# Patient Record
Sex: MALE | Race: WHITE | Employment: OTHER | URBAN - METROPOLITAN AREA
[De-identification: names, ages, dates, MRNs, and addresses within clinical notes are randomized per-mention and may not be internally consistent; named-entity substitution may affect disease eponyms.]

---

## 2017-01-26 LAB
ALBUMIN SERPL-MCNC: 4.5 G/DL
ALP BLD-CCNC: 109 U/L
ALT SERPL-CCNC: 16 U/L
ANION GAP SERPL CALCULATED.3IONS-SCNC: NORMAL MMOL/L
AST SERPL-CCNC: 12 U/L
BILIRUB SERPL-MCNC: 0.2 MG/DL (ref 0.1–1.4)
BUN BLDV-MCNC: 10 MG/DL
CALCIUM SERPL-MCNC: 9.3 MG/DL
CHLORIDE BLD-SCNC: 94 MMOL/L
CHOLESTEROL, TOTAL: 155 MG/DL
CHOLESTEROL/HDL RATIO: ABNORMAL
CO2: NORMAL MMOL/L
CREAT SERPL-MCNC: 1 MG/DL
GFR CALCULATED: NORMAL
GLUCOSE BLD-MCNC: 284 MG/DL
HDLC SERPL-MCNC: 92 MG/DL (ref 35–70)
LDL CHOLESTEROL CALCULATED: 38 MG/DL (ref 0–160)
POTASSIUM SERPL-SCNC: 4.8 MMOL/L
SODIUM BLD-SCNC: 136 MMOL/L
TOTAL PROTEIN: 6.3
TRIGL SERPL-MCNC: 124 MG/DL
VLDLC SERPL CALC-MCNC: ABNORMAL MG/DL

## 2017-07-10 ENCOUNTER — OFFICE VISIT (OUTPATIENT)
Dept: CARDIOLOGY CLINIC | Age: 72
End: 2017-07-10

## 2017-07-10 ENCOUNTER — TELEPHONE (OUTPATIENT)
Dept: CARDIOLOGY CLINIC | Age: 72
End: 2017-07-10

## 2017-07-10 VITALS
WEIGHT: 219 LBS | BODY MASS INDEX: 32.44 KG/M2 | SYSTOLIC BLOOD PRESSURE: 138 MMHG | DIASTOLIC BLOOD PRESSURE: 84 MMHG | HEART RATE: 88 BPM | HEIGHT: 69 IN

## 2017-07-10 DIAGNOSIS — E78.2 MIXED HYPERLIPIDEMIA: ICD-10-CM

## 2017-07-10 DIAGNOSIS — E66.09 NON MORBID OBESITY DUE TO EXCESS CALORIES: ICD-10-CM

## 2017-07-10 DIAGNOSIS — I48.92 ATRIAL FLUTTER, UNSPECIFIED TYPE (HCC): Primary | ICD-10-CM

## 2017-07-10 DIAGNOSIS — J44.9 COPD, SEVERE (HCC): ICD-10-CM

## 2017-07-10 DIAGNOSIS — E11.59 TYPE 2 DIABETES MELLITUS WITH OTHER CIRCULATORY COMPLICATION: ICD-10-CM

## 2017-07-10 DIAGNOSIS — I10 ESSENTIAL HYPERTENSION: ICD-10-CM

## 2017-07-10 DIAGNOSIS — I27.81 CHRONIC COR PULMONALE (HCC): ICD-10-CM

## 2017-07-10 PROCEDURE — 99213 OFFICE O/P EST LOW 20 MIN: CPT | Performed by: INTERNAL MEDICINE

## 2017-07-10 PROCEDURE — 93000 ELECTROCARDIOGRAM COMPLETE: CPT | Performed by: INTERNAL MEDICINE

## 2017-07-14 ENCOUNTER — OFFICE VISIT (OUTPATIENT)
Dept: CARDIOLOGY CLINIC | Age: 72
End: 2017-07-14

## 2017-07-14 DIAGNOSIS — I48.92 ATRIAL FLUTTER, UNSPECIFIED TYPE (HCC): Primary | ICD-10-CM

## 2017-07-14 PROCEDURE — 99999 PR OFFICE/OUTPT VISIT,PROCEDURE ONLY: CPT | Performed by: INTERNAL MEDICINE

## 2017-07-17 ENCOUNTER — TELEPHONE (OUTPATIENT)
Dept: CARDIOLOGY CLINIC | Age: 72
End: 2017-07-17

## 2017-07-19 ENCOUNTER — OFFICE VISIT (OUTPATIENT)
Dept: CARDIOLOGY CLINIC | Age: 72
End: 2017-07-19

## 2017-07-19 VITALS
DIASTOLIC BLOOD PRESSURE: 80 MMHG | HEIGHT: 69 IN | WEIGHT: 219.8 LBS | SYSTOLIC BLOOD PRESSURE: 120 MMHG | BODY MASS INDEX: 32.56 KG/M2 | HEART RATE: 88 BPM

## 2017-07-19 DIAGNOSIS — I48.3 TYPICAL ATRIAL FLUTTER (HCC): Primary | ICD-10-CM

## 2017-07-19 PROCEDURE — 99204 OFFICE O/P NEW MOD 45 MIN: CPT | Performed by: INTERNAL MEDICINE

## 2017-07-19 PROCEDURE — 93000 ELECTROCARDIOGRAM COMPLETE: CPT | Performed by: INTERNAL MEDICINE

## 2017-07-20 ENCOUNTER — TELEPHONE (OUTPATIENT)
Dept: CARDIOLOGY CLINIC | Age: 72
End: 2017-07-20

## 2017-07-24 ASSESSMENT — ENCOUNTER SYMPTOMS
CHEST TIGHTNESS: 0
BLOOD IN STOOL: 0
PHOTOPHOBIA: 0
SHORTNESS OF BREATH: 1
NAUSEA: 0
BACK PAIN: 0
COLOR CHANGE: 0
ABDOMINAL PAIN: 0
VOMITING: 0
COUGH: 0
WHEEZING: 0
EYE PAIN: 0

## 2017-07-25 ENCOUNTER — TELEPHONE (OUTPATIENT)
Dept: CARDIOLOGY CLINIC | Age: 72
End: 2017-07-25

## 2017-08-04 ENCOUNTER — HOSPITAL ENCOUNTER (OUTPATIENT)
Dept: GENERAL RADIOLOGY | Age: 72
Discharge: HOME | End: 2017-08-04
Attending: FAMILY MEDICINE | Admitting: FAMILY MEDICINE

## 2017-08-04 ENCOUNTER — HOSPITAL ENCOUNTER (OUTPATIENT)
Dept: GENERAL RADIOLOGY | Age: 72
Discharge: HOME | End: 2017-08-04
Attending: INTERNAL MEDICINE | Admitting: INTERNAL MEDICINE

## 2017-08-04 DIAGNOSIS — I47.1 PAROXYSMAL SUPRAVENTRICULAR TACHYCARDIA (HCC): ICD-10-CM

## 2017-08-04 DIAGNOSIS — J44.1 OBSTRUCTIVE CHRONIC BRONCHITIS WITH EXACERBATION (HCC): ICD-10-CM

## 2017-08-04 DIAGNOSIS — R92.8 ABNORMAL MAMMOGRAM, UNSPECIFIED: ICD-10-CM

## 2017-08-04 DIAGNOSIS — D48.1 NEOPLASM OF UNCERTAIN BEHAVIOR OF CONNECTIVE AND OTHER SOFT TISSUE: ICD-10-CM

## 2017-08-04 DIAGNOSIS — R63.4 LOSS OF WEIGHT: ICD-10-CM

## 2017-08-04 LAB
ALBUMIN SERPL-MCNC: 4.9 GM/DL (ref 3.4–5)
ALP BLD-CCNC: 66 IU/L (ref 40–128)
ALT SERPL-CCNC: 16 U/L (ref 10–40)
ANION GAP SERPL CALCULATED.3IONS-SCNC: 14 MMOL/L (ref 4–16)
APTT: 37.5 SECONDS (ref 21.2–33)
AST SERPL-CCNC: 22 IU/L (ref 15–37)
BASOPHILS ABSOLUTE: 0.1 K/CU MM
BASOPHILS RELATIVE PERCENT: 0.5 % (ref 0–1)
BILIRUB SERPL-MCNC: 0.5 MG/DL (ref 0–1)
BUN BLDV-MCNC: 10 MG/DL (ref 6–23)
CALCIUM SERPL-MCNC: 10 MG/DL (ref 8.3–10.6)
CEA: 3.8 NG/ML
CHLORIDE BLD-SCNC: 92 MMOL/L (ref 99–110)
CO2: 30 MMOL/L (ref 21–32)
CREAT SERPL-MCNC: 0.9 MG/DL (ref 0.9–1.3)
DIFFERENTIAL TYPE: ABNORMAL
EOSINOPHILS ABSOLUTE: 0.2 K/CU MM
EOSINOPHILS RELATIVE PERCENT: 2.1 % (ref 0–3)
GFR AFRICAN AMERICAN: >60 ML/MIN/1.73M2
GFR NON-AFRICAN AMERICAN: >60 ML/MIN/1.73M2
GLUCOSE BLD-MCNC: 87 MG/DL (ref 70–140)
HCT VFR BLD CALC: 49.5 % (ref 42–52)
HEMOGLOBIN: 16.4 GM/DL (ref 13.5–18)
IMMATURE NEUTROPHIL %: 0.4 % (ref 0–0.43)
INR BLD: 0.96 INDEX
LACTATE DEHYDROGENASE: 184 IU/L (ref 120–246)
LYMPHOCYTES ABSOLUTE: 3.8 K/CU MM
LYMPHOCYTES RELATIVE PERCENT: 34.2 % (ref 24–44)
MCH RBC QN AUTO: 32.9 PG (ref 27–31)
MCHC RBC AUTO-ENTMCNC: 33.1 % (ref 32–36)
MCV RBC AUTO: 99.4 FL (ref 78–100)
MONOCYTES ABSOLUTE: 0.9 K/CU MM
MONOCYTES RELATIVE PERCENT: 7.8 % (ref 0–4)
NUCLEATED RBC %: 0 %
PDW BLD-RTO: 12.9 % (ref 11.7–14.9)
PLATELET # BLD: 193 K/CU MM (ref 140–440)
PMV BLD AUTO: 8.8 FL (ref 7.5–11.1)
POTASSIUM SERPL-SCNC: 4.6 MMOL/L (ref 3.5–5.1)
PROLACTIN: 12.2 NG/ML
PROSTATE SPECIFIC ANTIGEN: 0.66 NG/ML (ref 0–4)
PROTHROMBIN TIME: 10.9 SECONDS (ref 9.12–12.5)
RBC # BLD: 4.98 M/CU MM (ref 4.6–6.2)
SEGMENTED NEUTROPHILS ABSOLUTE COUNT: 6.1 K/CU MM
SEGMENTED NEUTROPHILS RELATIVE PERCENT: 55 % (ref 36–66)
SODIUM BLD-SCNC: 136 MMOL/L (ref 135–145)
TOTAL IMMATURE NEUTOROPHIL: 0.04 K/CU MM
TOTAL NUCLEATED RBC: 0 K/CU MM
TOTAL PROTEIN: 7.1 GM/DL (ref 6.4–8.2)
WBC # BLD: 11.1 K/CU MM (ref 4–10.5)

## 2017-08-04 RX ORDER — SODIUM CHLORIDE 0.9 % (FLUSH) 0.9 %
10 SYRINGE (ML) INJECTION ONCE
Status: COMPLETED | OUTPATIENT
Start: 2017-08-04 | End: 2017-08-04

## 2017-08-04 RX ADMIN — Medication 10 ML: at 13:55

## 2017-08-06 LAB — MS ALPHA-FETOPROTEIN: 2

## 2017-08-08 LAB
Lab: NORMAL
TEST NAME: NORMAL

## 2017-08-12 PROBLEM — N63.10 BREAST MASS, RIGHT: Status: ACTIVE | Noted: 2017-08-12

## 2017-08-23 ENCOUNTER — OFFICE VISIT (OUTPATIENT)
Dept: CARDIOLOGY CLINIC | Age: 72
End: 2017-08-23

## 2017-08-23 VITALS
WEIGHT: 214.2 LBS | HEIGHT: 70 IN | DIASTOLIC BLOOD PRESSURE: 92 MMHG | BODY MASS INDEX: 30.67 KG/M2 | SYSTOLIC BLOOD PRESSURE: 162 MMHG | OXYGEN SATURATION: 91 % | HEART RATE: 66 BPM

## 2017-08-23 DIAGNOSIS — I48.3 TYPICAL ATRIAL FLUTTER (HCC): Primary | ICD-10-CM

## 2017-08-23 PROCEDURE — 93000 ELECTROCARDIOGRAM COMPLETE: CPT | Performed by: INTERNAL MEDICINE

## 2017-08-23 PROCEDURE — 99213 OFFICE O/P EST LOW 20 MIN: CPT | Performed by: INTERNAL MEDICINE

## 2017-08-28 ENCOUNTER — TELEPHONE (OUTPATIENT)
Dept: CARDIOLOGY CLINIC | Age: 72
End: 2017-08-28

## 2017-08-28 DIAGNOSIS — Z01.810 PRE-OPERATIVE CARDIOVASCULAR EXAMINATION: Primary | ICD-10-CM

## 2017-08-28 DIAGNOSIS — E78.5 HYPERLIPIDEMIA, UNSPECIFIED HYPERLIPIDEMIA TYPE: ICD-10-CM

## 2017-08-28 DIAGNOSIS — I10 ESSENTIAL HYPERTENSION: ICD-10-CM

## 2017-08-28 DIAGNOSIS — I48.3 TYPICAL ATRIAL FLUTTER (HCC): ICD-10-CM

## 2017-08-28 PROBLEM — R22.2 CHEST MASS: Status: ACTIVE | Noted: 2017-08-28

## 2017-12-27 ENCOUNTER — OFFICE VISIT (OUTPATIENT)
Dept: CARDIOLOGY CLINIC | Age: 72
End: 2017-12-27

## 2017-12-27 VITALS
HEART RATE: 80 BPM | HEIGHT: 70 IN | BODY MASS INDEX: 28.95 KG/M2 | WEIGHT: 202.2 LBS | SYSTOLIC BLOOD PRESSURE: 134 MMHG | DIASTOLIC BLOOD PRESSURE: 80 MMHG

## 2017-12-27 DIAGNOSIS — E11.59 TYPE 2 DIABETES MELLITUS WITH OTHER CIRCULATORY COMPLICATION, WITHOUT LONG-TERM CURRENT USE OF INSULIN (HCC): ICD-10-CM

## 2017-12-27 DIAGNOSIS — I10 ESSENTIAL HYPERTENSION: ICD-10-CM

## 2017-12-27 DIAGNOSIS — I27.81 CHRONIC COR PULMONALE (HCC): ICD-10-CM

## 2017-12-27 DIAGNOSIS — I48.3 TYPICAL ATRIAL FLUTTER (HCC): Primary | ICD-10-CM

## 2017-12-27 DIAGNOSIS — E78.5 HYPERLIPIDEMIA, UNSPECIFIED HYPERLIPIDEMIA TYPE: ICD-10-CM

## 2017-12-27 PROCEDURE — 99213 OFFICE O/P EST LOW 20 MIN: CPT | Performed by: INTERNAL MEDICINE

## 2017-12-27 NOTE — LETTER
Cardiology [address] [place] [id number]  Phone: [phone]  Fax: [phone]    Payton Salinas MD        December 27, 2017     Margot Sosa MD  [address] [place] [id number]    Patient: Sahara Gore  MR Number: [id number]  YOB: 1945  Date of Visit: 12/27/2017    Dear Dr. Margot Sosa:    Thank you for the request for consultation for Jerry Carreon to me for the evaluation of multiple risk factors. Below are the relevant portions of my assessment and plan of care. If you have questions, please do not hesitate to call me. I look forward to following Altagracia Corona along with you.     Sincerely,        Payton Salinas MD

## 2017-12-27 NOTE — PROGRESS NOTES
OFFICE PROGRESS NOTES      Myna Blizzard is a 74 y.o. male who has    CHIEF COMPLAINT AS FOLLOWS:  CHEST PAIN: Patient denies any C/O chest pains at this time. SOB: Has SOB with exertion but no change over previous noted. LEG EDEMA: No leg edema   PALPITATIONS: Denies any C/O Palpitations                              DIZZINESS: No C/O Dizziness   SYNCOPE: None   OTHER:                                     HPI: Patient is here for F/U on his CAD, HTN & Dyslipidemia problems. He does not have any complaints at this time. Mancel Drivers has the following history recorded in care path:  Patient Active Problem List    Diagnosis Date Noted    GI bleed 12/22/2014     Priority: High    Chronic cor pulmonale 11/11/2013     Priority: Medium    Chronic respiratory failure 11/11/2013     Priority: Medium    Hypertension      Priority: Medium    Typical atrial flutter (HCC)      Priority: Medium    Diabetes mellitus      Priority: Medium    Hyperlipidemia      Priority: Medium    COPD (chronic obstructive pulmonary disease)      Priority: Medium    Obesity      Priority: Medium    Chest mass 08/28/2017    Breast mass, right 08/12/2017    COPD, severe 11/16/2015     Current Outpatient Prescriptions   Medication Sig Dispense Refill    carBAMazepine (TEGRETOL) 200 MG tablet Take 200 mg by mouth 3 times daily      busPIRone (BUSPAR) 30 MG tablet Take 30 mg by mouth daily       fluticasone-salmeterol (ADVAIR DISKUS) 500-50 MCG/DOSE diskus inhaler Inhale 1 puff into the lungs every 12 hours.  tadalafil (CIALIS) 5 MG tablet Take 5 mg by mouth as needed for Erectile Dysfunction.  rivaroxaban (XARELTO) 20 MG TABS tablet Take 1 tablet by mouth Daily. **Pt started on 4/15/14 w/samples. ** 90 tablet 3    atorvastatin (LIPITOR) 40 MG tablet Take 40 mg by mouth daily.         diltiazem (CARDIZEM CD) 240 MG ER capsule Take 240 mg by mouth daily.        theophylline CR, 12 hour, (THEODUR) 300 MG CR tablet Take 300 mg by mouth 2 times daily.  escitalopram (LEXAPRO) 20 MG tablet Take 20 mg by mouth daily.  aspirin 81 MG tablet Take 81 mg by mouth daily.  OXYGEN 2.5 L nightly.  tiotropium (SPIRIVA HANDIHALER) 18 MCG inhalation capsule Inhale 18 mcg into the lungs daily. No current facility-administered medications for this visit. Allergies: Review of patient's allergies indicates no known allergies. Past Medical History:   Diagnosis Date    At[facility] flutter ([facility]     10/2006-s/p atrial ablation    COPD (chronic obstructive pulmonary disease) ([facility]     [facility]     H/O 24 hour EKG monitoring 12/06, 11/06 12/06. 24 hr Holter monitor. Normal    H/O cardiovascular stress test 11/10, 12/07    11/10. EF 64%. Normal myocardial study    H/O echocardiogram 11/12/2013, 11/09 11/13 EF55% LA mild dilated, mild MR, mild-mod TR, mild pulm htn 11/09. EF >55%. The left atrium is mildly dilated.  History of cardiac monitoring 5/24/14    14 day Event-normal with no errythmia    History of cardioversion 4/21/2014    Hx of echocardiogram 4/15/2014    sclerotic aortic valve, moderate pulmonary HTN, EF45-50%    Hyperlipidemia     Hypertension     Obesity      Past Surgical History:   Procedure Laterality Date    ATRIAL ABLATION SURGERY  10/06    COLONOSCOPY  3/3/15    severe divertics, multiple polyps, hem    INFECTED SKIN DEBRIDEMENT  3/12    PACEMAKER INSERTION  10/6/06    Bullous emphysema.  No change compared with the previous exam.      As reviewed   Family History   Problem Relation Age of Onset    High Blood Pressure Brother      Social History   Substance Use Topics    Smoking status: Current Every Day Smoker     Packs/day: 0.50     Years: 40.00     Types: Cigarettes     Last attempt to quit: 6/6/2002    Smokeless tobacco: Never Used    Alcohol use 7.2 oz/week     12 Cans of beer per week      Comment: occasionally       Review of Systems:    Constitutional: Negative for diaphoresis and fatigue  Psychological:Negative for anxiety or depression  HENT: Negative for headaches, nasal congestion, sinus pain or vertigo  Eyes: Negative for visual disturbance. Endocrine: Negative for polydipsia/polyuria  Respiratory: Negative for shortness of breath  Cardiovascular: Negative for chest pain, dyspnea on exertion, claudication, edema, irregular heartbeat, murmur, palpitations or shortness of breath  Gastrointestinal: Negative for abdominal pain or heartburn  Genito-Urinary: Negative for urinary frequency/urgency  Musculoskeletal: Negative for muscle pain, muscular weakness, negative for pain in arm and leg or swelling in foot and leg  Neurological: Negative for dizziness, headaches, memory loss, numbness/tingling, visual changes, syncope  Dermatological: Negative for rash    Objective:  /80   Pulse 80   Ht 5' 10\" (1.778 m)   Wt 202 lb 3.2 oz (91.7 kg)   BMI 29.01 kg/m²   Wt Readings from Last 3 Encounters:   12/27/17 202 lb 3.2 oz (91.7 kg)   08/25/17 213 lb 3.2 oz (96.7 kg)   08/23/17 214 lb 3.2 oz (97.2 kg)     Body mass index is 29.01 kg/m². GENERAL - Alert, oriented, pleasant, in no apparent distress. EYES: No jaundice, no conjunctival pallor. SKIN: It is warm & dry. No rashes. No Echhymosis    HEENT  No clinically significant abnormalities seen. Neck - Supple. No jugular venous distention noted. No carotid bruits. Cardiovascular  Normal S1 and S2 without obvious murmur or gallop. Extremities - No cyanosis, clubbing, or significant edema. Pulmonary  No respiratory distress. No wheezes or rales. Abdomen  No masses, tenderness, or organomegaly. Musculoskeletal  No significant edema. No joint deformities. No muscle wasting. Neurologic  Cranial nerves II through XII are grossly intact. There were no gross focal neurologic abnormalities.     Lab Review   No results found mass R22.2       Assessment & Plan:    CAD:None known. HTN:well controlled on current medical regimen, see list above. - changes in  treatment:   no   CARDIOMYOPATHY: None known   CONGESTIVE HEART FAILURE: NO KNOWN HISTORY.     VHD: No significant VHD noted  DYSLIPIDEMIA: Patient's profile is at / near Goal.yes,                                 HDL is high                                Tolerating current medical regimen wellyes,                                                            See most recent Lab values in Labs section above. Diabetes mellitis: .yes,                                      Managed by family MD                                     BS under good control yes,                                      Hgb A1c avilable yes,   OTHER RELEVANT DIAGNOSIS:as noted in patient's active problem list:  TESTS ORDERED:Stress Cardiolite. All previously ordered tests reviewed. ARRHYTHMIAS: Patient has H/O Atrial fibrillation                                He is rate controlled & on anticoagulation. MEDICATIONS: CPM   Office f/u in six months. Primary/secondary prevention is the goal by aggressive risk modification, healthy and therapeutic life style changes for cardiovascular risk reduction. Various goals are discussed and multiple questions answered.

## 2018-01-10 ENCOUNTER — TELEPHONE (OUTPATIENT)
Dept: CARDIOLOGY CLINIC | Age: 73
End: 2018-01-10

## 2018-01-19 LAB
ALBUMIN SERPL-MCNC: 4 G/DL
ALP BLD-CCNC: 46 U/L
ALT SERPL-CCNC: 16 U/L
ANION GAP SERPL CALCULATED.3IONS-SCNC: NORMAL MMOL/L
AST SERPL-CCNC: 15 U/L
AVERAGE GLUCOSE: NORMAL
BASOPHILS ABSOLUTE: 0 /ΜL
BASOPHILS RELATIVE PERCENT: 0.4 %
BILIRUB SERPL-MCNC: 0.4 MG/DL (ref 0.1–1.4)
BUN BLDV-MCNC: 12 MG/DL
CALCIUM SERPL-MCNC: 9.9 MG/DL
CHLORIDE BLD-SCNC: 102 MMOL/L
CHOLESTEROL, TOTAL: 159 MG/DL
CHOLESTEROL/HDL RATIO: ABNORMAL
CO2: 30 MMOL/L
CREAT SERPL-MCNC: 0.8 MG/DL
EOSINOPHILS ABSOLUTE: 0.2 /ΜL
EOSINOPHILS RELATIVE PERCENT: 1.7 %
GFR CALCULATED: NORMAL
GLUCOSE BLD-MCNC: 93 MG/DL
HBA1C MFR BLD: 5.3 %
HCT VFR BLD CALC: 38.7 % (ref 41–53)
HDLC SERPL-MCNC: 128 MG/DL (ref 35–70)
HEMOGLOBIN: 13 G/DL (ref 13.5–17.5)
LDL CHOLESTEROL CALCULATED: 22 MG/DL (ref 0–160)
LYMPHOCYTES ABSOLUTE: 3.9 /ΜL
LYMPHOCYTES RELATIVE PERCENT: 39.8 %
MCH RBC QN AUTO: 33 PG
MCHC RBC AUTO-ENTMCNC: 33.5 G/DL
MCV RBC AUTO: 98.4 FL
MONOCYTES ABSOLUTE: 0.7 /ΜL
MONOCYTES RELATIVE PERCENT: 6.7 %
NEUTROPHILS ABSOLUTE: 5 /ΜL
NEUTROPHILS RELATIVE PERCENT: 51.4 %
PLATELET # BLD: 203 K/ΜL
PMV BLD AUTO: ABNORMAL FL
POTASSIUM SERPL-SCNC: 4.4 MMOL/L
RBC # BLD: 3.93 10^6/ΜL
SODIUM BLD-SCNC: 141 MMOL/L
TOTAL PROTEIN: 5.9
TRIGL SERPL-MCNC: 43 MG/DL
VLDLC SERPL CALC-MCNC: 9 MG/DL
WBC # BLD: 9.8 10^3/ML

## 2018-10-31 ENCOUNTER — HOSPITAL ENCOUNTER (EMERGENCY)
Age: 73
Discharge: HOME | End: 2018-10-31
Payer: MEDICARE

## 2018-10-31 VITALS
WEIGHT: 202 LBS | BODY MASS INDEX: 28.92 KG/M2 | OXYGEN SATURATION: 96 % | DIASTOLIC BLOOD PRESSURE: 85 MMHG | HEIGHT: 70 IN | SYSTOLIC BLOOD PRESSURE: 146 MMHG | TEMPERATURE: 97.8 F | RESPIRATION RATE: 15 BRPM | HEART RATE: 88 BPM

## 2018-10-31 DIAGNOSIS — S61.011A LACERATION OF RIGHT THUMB WITHOUT FOREIGN BODY WITHOUT DAMAGE TO NAIL, INITIAL ENCOUNTER: Primary | ICD-10-CM

## 2018-10-31 PROCEDURE — 6370000000 HC RX 637 (ALT 250 FOR IP): Performed by: PHYSICIAN ASSISTANT

## 2018-10-31 PROCEDURE — 6360000002 HC RX W HCPCS: Performed by: PHYSICIAN ASSISTANT

## 2018-10-31 PROCEDURE — 99283 EMERGENCY DEPT VISIT LOW MDM: CPT

## 2018-10-31 PROCEDURE — 90471 IMMUNIZATION ADMIN: CPT | Performed by: PHYSICIAN ASSISTANT

## 2018-10-31 PROCEDURE — 90715 TDAP VACCINE 7 YRS/> IM: CPT | Performed by: PHYSICIAN ASSISTANT

## 2018-10-31 RX ORDER — ACETAMINOPHEN 500 MG
500 TABLET ORAL ONCE
Status: COMPLETED | OUTPATIENT
Start: 2018-10-31 | End: 2018-10-31

## 2018-10-31 RX ADMIN — ACETAMINOPHEN 500 MG: 500 TABLET, FILM COATED ORAL at 17:51

## 2018-10-31 RX ADMIN — TETANUS TOXOID, REDUCED DIPHTHERIA TOXOID AND ACELLULAR PERTUSSIS VACCINE, ADSORBED 0.5 ML: 5; 2.5; 8; 8; 2.5 SUSPENSION INTRAMUSCULAR at 17:50

## 2018-10-31 RX ADMIN — Medication: at 17:51

## 2018-10-31 ASSESSMENT — PAIN SCALES - GENERAL: PAINLEVEL_OUTOF10: 4

## 2018-10-31 ASSESSMENT — PAIN DESCRIPTION - LOCATION: LOCATION: FINGER (COMMENT WHICH ONE)

## 2018-10-31 ASSESSMENT — PAIN DESCRIPTION - PAIN TYPE: TYPE: ACUTE PAIN

## 2018-10-31 ASSESSMENT — PAIN DESCRIPTION - DESCRIPTORS: DESCRIPTORS: BURNING

## 2018-10-31 ASSESSMENT — PAIN DESCRIPTION - FREQUENCY: FREQUENCY: CONTINUOUS

## 2018-10-31 ASSESSMENT — PAIN DESCRIPTION - ORIENTATION: ORIENTATION: RIGHT

## 2018-10-31 NOTE — ED PROVIDER NOTES
Obesity      Past Surgical History:   Procedure Laterality Date    ATRIAL ABLATION SURGERY  10/06    COLONOSCOPY  3/3/15    severe divertics, multiple polyps, hem    INFECTED SKIN DEBRIDEMENT  3/12    PACEMAKER INSERTION  10/6/06    Bullous emphysema. No change compared with the previous exam.       CURRENT MEDICATIONS    Current Outpatient Rx   Medication Sig Dispense Refill    carBAMazepine (TEGRETOL) 200 MG tablet Take 200 mg by mouth 3 times daily      busPIRone (BUSPAR) 30 MG tablet Take 30 mg by mouth daily       fluticasone-salmeterol (ADVAIR DISKUS) 500-50 MCG/DOSE diskus inhaler Inhale 1 puff into the lungs every 12 hours.  tadalafil (CIALIS) 5 MG tablet Take 5 mg by mouth as needed for Erectile Dysfunction.  rivaroxaban (XARELTO) 20 MG TABS tablet Take 1 tablet by mouth Daily. **Pt started on 4/15/14 w/samples. ** 90 tablet 3    atorvastatin (LIPITOR) 40 MG tablet Take 40 mg by mouth daily.  diltiazem (CARDIZEM CD) 240 MG ER capsule Take 240 mg by mouth daily.  theophylline CR, 12 hour, (THEODUR) 300 MG CR tablet Take 300 mg by mouth 2 times daily.  escitalopram (LEXAPRO) 20 MG tablet Take 20 mg by mouth daily.  aspirin 81 MG tablet Take 81 mg by mouth daily.  OXYGEN 2.5 L nightly.  tiotropium (SPIRIVA HANDIHALER) 18 MCG inhalation capsule Inhale 18 mcg into the lungs daily.            ALLERGIES    No Known Allergies    SOCIAL & FAMILY HISTORY    Social History     Social History    Marital status:      Spouse name: N/A    Number of children: N/A    Years of education: N/A     Social History Main Topics    Smoking status: Current Every Day Smoker     Packs/day: 0.50     Years: 40.00     Types: Cigarettes     Last attempt to quit: 6/6/2002    Smokeless tobacco: Never Used    Alcohol use 7.2 oz/week     12 Cans of beer per week      Comment: occasionally     Drug use: No    Sexual activity: Yes     Partners: Female      Comment:

## 2020-11-03 PROBLEM — J44.9 COPD (CHRONIC OBSTRUCTIVE PULMONARY DISEASE) (HCC): Status: RESOLVED | Noted: 2020-11-03 | Resolved: 2020-11-03

## 2021-02-09 PROBLEM — K43.9 SPIGELIAN HERNIA: Status: ACTIVE | Noted: 2021-02-09

## 2021-02-14 ENCOUNTER — APPOINTMENT (OUTPATIENT)
Dept: CT IMAGING | Age: 76
DRG: 394 | End: 2021-02-14
Payer: OTHER GOVERNMENT

## 2021-02-14 ENCOUNTER — HOSPITAL ENCOUNTER (INPATIENT)
Age: 76
LOS: 2 days | Discharge: HOME | DRG: 394 | End: 2021-02-16
Attending: INTERNAL MEDICINE | Admitting: INTERNAL MEDICINE
Payer: OTHER GOVERNMENT

## 2021-02-14 DIAGNOSIS — K43.9 ABDOMINAL WALL HERNIA: Primary | ICD-10-CM

## 2021-02-14 DIAGNOSIS — R10.32 ABDOMINAL PAIN, LEFT LOWER QUADRANT: ICD-10-CM

## 2021-02-14 PROBLEM — K46.9 ABDOMINAL HERNIA: Status: ACTIVE | Noted: 2021-02-14

## 2021-02-14 LAB
ALBUMIN SERPL-MCNC: 4.2 GM/DL (ref 3.4–5)
ALP BLD-CCNC: 62 IU/L (ref 40–128)
ALT SERPL-CCNC: 14 U/L (ref 10–40)
AMORPHOUS: ABNORMAL /HPF
ANION GAP SERPL CALCULATED.3IONS-SCNC: 11 MMOL/L (ref 4–16)
AST SERPL-CCNC: 23 IU/L (ref 15–37)
BACTERIA: NEGATIVE /HPF
BASOPHILS ABSOLUTE: 0.1 K/CU MM
BASOPHILS RELATIVE PERCENT: 0.4 % (ref 0–1)
BILIRUB SERPL-MCNC: 0.3 MG/DL (ref 0–1)
BILIRUBIN URINE: NEGATIVE MG/DL
BLOOD, URINE: NEGATIVE
BUN BLDV-MCNC: 8 MG/DL (ref 6–23)
CALCIUM SERPL-MCNC: 9 MG/DL (ref 8.3–10.6)
CHLORIDE BLD-SCNC: 98 MMOL/L (ref 99–110)
CLARITY: CLEAR
CO2: 26 MMOL/L (ref 21–32)
COLOR: ABNORMAL
CREAT SERPL-MCNC: 1 MG/DL (ref 0.9–1.3)
DIFFERENTIAL TYPE: ABNORMAL
EOSINOPHILS ABSOLUTE: 0.1 K/CU MM
EOSINOPHILS RELATIVE PERCENT: 1 % (ref 0–3)
GFR AFRICAN AMERICAN: >60 ML/MIN/1.73M2
GFR NON-AFRICAN AMERICAN: >60 ML/MIN/1.73M2
GLUCOSE BLD-MCNC: 130 MG/DL (ref 70–99)
GLUCOSE BLD-MCNC: 74 MG/DL (ref 70–99)
GLUCOSE BLD-MCNC: 89 MG/DL (ref 70–99)
GLUCOSE, URINE: NEGATIVE MG/DL
HCT VFR BLD CALC: 42.9 % (ref 42–52)
HEMOGLOBIN: 13.5 GM/DL (ref 13.5–18)
IMMATURE NEUTROPHIL %: 0.3 % (ref 0–0.43)
INR BLD: 1.36 INDEX
KETONES, URINE: NEGATIVE MG/DL
LACTATE: 0.8 MMOL/L (ref 0.4–2)
LACTATE: 2 MMOL/L (ref 0.4–2)
LEUKOCYTE ESTERASE, URINE: NEGATIVE
LIPASE: 30 IU/L (ref 13–60)
LYMPHOCYTES ABSOLUTE: 5.5 K/CU MM
LYMPHOCYTES RELATIVE PERCENT: 44 % (ref 24–44)
MAGNESIUM: 1.7 MG/DL (ref 1.8–2.4)
MCH RBC QN AUTO: 32 PG (ref 27–31)
MCHC RBC AUTO-ENTMCNC: 31.5 % (ref 32–36)
MCV RBC AUTO: 101.7 FL (ref 78–100)
MONOCYTES ABSOLUTE: 1 K/CU MM
MONOCYTES RELATIVE PERCENT: 8 % (ref 0–4)
MUCUS: ABNORMAL HPF
NITRITE URINE, QUANTITATIVE: NEGATIVE
NUCLEATED RBC %: 0 %
PDW BLD-RTO: 13.6 % (ref 11.7–14.9)
PH, URINE: 7 (ref 5–8)
PLATELET # BLD: 173 K/CU MM (ref 140–440)
PMV BLD AUTO: 8.8 FL (ref 7.5–11.1)
POTASSIUM SERPL-SCNC: 4.1 MMOL/L (ref 3.5–5.1)
PROTEIN UA: 30 MG/DL
PROTHROMBIN TIME: 16.5 SECONDS (ref 11.7–14.5)
RBC # BLD: 4.22 M/CU MM (ref 4.6–6.2)
RBC URINE: <1 /HPF (ref 0–3)
SARS-COV-2, NAAT: NOT DETECTED
SEGMENTED NEUTROPHILS ABSOLUTE COUNT: 5.8 K/CU MM
SEGMENTED NEUTROPHILS RELATIVE PERCENT: 46.3 % (ref 36–66)
SODIUM BLD-SCNC: 135 MMOL/L (ref 135–145)
SOURCE: NORMAL
SPECIFIC GRAVITY UA: 1 (ref 1–1.03)
TOTAL IMMATURE NEUTOROPHIL: 0.04 K/CU MM
TOTAL NUCLEATED RBC: 0 K/CU MM
TOTAL PROTEIN: 6.6 GM/DL (ref 6.4–8.2)
TRICHOMONAS: ABNORMAL /HPF
UROBILINOGEN, URINE: NEGATIVE MG/DL (ref 0.2–1)
WBC # BLD: 12.5 K/CU MM (ref 4–10.5)
WBC UA: <1 /HPF (ref 0–2)

## 2021-02-14 PROCEDURE — 2700000000 HC OXYGEN THERAPY PER DAY

## 2021-02-14 PROCEDURE — 96374 THER/PROPH/DIAG INJ IV PUSH: CPT

## 2021-02-14 PROCEDURE — 83735 ASSAY OF MAGNESIUM: CPT

## 2021-02-14 PROCEDURE — 74177 CT ABD & PELVIS W/CONTRAST: CPT

## 2021-02-14 PROCEDURE — 96361 HYDRATE IV INFUSION ADD-ON: CPT

## 2021-02-14 PROCEDURE — 6360000002 HC RX W HCPCS: Performed by: NURSE PRACTITIONER

## 2021-02-14 PROCEDURE — 6360000004 HC RX CONTRAST MEDICATION: Performed by: PHYSICIAN ASSISTANT

## 2021-02-14 PROCEDURE — 94761 N-INVAS EAR/PLS OXIMETRY MLT: CPT

## 2021-02-14 PROCEDURE — 2580000003 HC RX 258: Performed by: PHYSICIAN ASSISTANT

## 2021-02-14 PROCEDURE — 6370000000 HC RX 637 (ALT 250 FOR IP): Performed by: NURSE PRACTITIONER

## 2021-02-14 PROCEDURE — 6360000002 HC RX W HCPCS: Performed by: PHYSICIAN ASSISTANT

## 2021-02-14 PROCEDURE — 36415 COLL VENOUS BLD VENIPUNCTURE: CPT

## 2021-02-14 PROCEDURE — 82962 GLUCOSE BLOOD TEST: CPT

## 2021-02-14 PROCEDURE — 85025 COMPLETE CBC W/AUTO DIFF WBC: CPT

## 2021-02-14 PROCEDURE — 80053 COMPREHEN METABOLIC PANEL: CPT

## 2021-02-14 PROCEDURE — 1200000000 HC SEMI PRIVATE

## 2021-02-14 PROCEDURE — 83690 ASSAY OF LIPASE: CPT

## 2021-02-14 PROCEDURE — 99285 EMERGENCY DEPT VISIT HI MDM: CPT

## 2021-02-14 PROCEDURE — 85610 PROTHROMBIN TIME: CPT

## 2021-02-14 PROCEDURE — 2580000003 HC RX 258: Performed by: NURSE PRACTITIONER

## 2021-02-14 PROCEDURE — 96375 TX/PRO/DX INJ NEW DRUG ADDON: CPT

## 2021-02-14 PROCEDURE — 81001 URINALYSIS AUTO W/SCOPE: CPT

## 2021-02-14 PROCEDURE — U0002 COVID-19 LAB TEST NON-CDC: HCPCS

## 2021-02-14 PROCEDURE — 83605 ASSAY OF LACTIC ACID: CPT

## 2021-02-14 PROCEDURE — 6370000000 HC RX 637 (ALT 250 FOR IP): Performed by: PHYSICIAN ASSISTANT

## 2021-02-14 RX ORDER — ONDANSETRON 2 MG/ML
4 INJECTION INTRAMUSCULAR; INTRAVENOUS EVERY 6 HOURS PRN
Status: DISCONTINUED | OUTPATIENT
Start: 2021-02-14 | End: 2021-02-16 | Stop reason: HOSPADM

## 2021-02-14 RX ORDER — DILTIAZEM HYDROCHLORIDE 240 MG/1
240 CAPSULE, COATED, EXTENDED RELEASE ORAL DAILY
Status: CANCELLED | OUTPATIENT
Start: 2021-02-14

## 2021-02-14 RX ORDER — DILTIAZEM HYDROCHLORIDE 240 MG/1
240 CAPSULE, COATED, EXTENDED RELEASE ORAL NIGHTLY
Status: DISCONTINUED | OUTPATIENT
Start: 2021-02-15 | End: 2021-02-16 | Stop reason: HOSPADM

## 2021-02-14 RX ORDER — 0.9 % SODIUM CHLORIDE 0.9 %
1000 INTRAVENOUS SOLUTION INTRAVENOUS ONCE
Status: COMPLETED | OUTPATIENT
Start: 2021-02-14 | End: 2021-02-14

## 2021-02-14 RX ORDER — MAGNESIUM SULFATE 1 G/100ML
1000 INJECTION INTRAVENOUS PRN
Status: DISCONTINUED | OUTPATIENT
Start: 2021-02-14 | End: 2021-02-16 | Stop reason: HOSPADM

## 2021-02-14 RX ORDER — FLUTICASONE FUROATE, UMECLIDINIUM BROMIDE AND VILANTEROL TRIFENATATE 100; 62.5; 25 UG/1; UG/1; UG/1
1 POWDER RESPIRATORY (INHALATION) DAILY
COMMUNITY

## 2021-02-14 RX ORDER — MORPHINE SULFATE 4 MG/ML
4 INJECTION, SOLUTION INTRAMUSCULAR; INTRAVENOUS EVERY 4 HOURS PRN
Status: DISCONTINUED | OUTPATIENT
Start: 2021-02-14 | End: 2021-02-16 | Stop reason: HOSPADM

## 2021-02-14 RX ORDER — NICOTINE POLACRILEX 4 MG
15 LOZENGE BUCCAL PRN
Status: DISCONTINUED | OUTPATIENT
Start: 2021-02-14 | End: 2021-02-16 | Stop reason: HOSPADM

## 2021-02-14 RX ORDER — ONDANSETRON 2 MG/ML
4 INJECTION INTRAMUSCULAR; INTRAVENOUS EVERY 30 MIN PRN
Status: DISCONTINUED | OUTPATIENT
Start: 2021-02-14 | End: 2021-02-16 | Stop reason: HOSPADM

## 2021-02-14 RX ORDER — PROMETHAZINE HYDROCHLORIDE 25 MG/1
12.5 TABLET ORAL EVERY 6 HOURS PRN
Status: DISCONTINUED | OUTPATIENT
Start: 2021-02-14 | End: 2021-02-16 | Stop reason: HOSPADM

## 2021-02-14 RX ORDER — ATORVASTATIN CALCIUM 40 MG/1
40 TABLET, FILM COATED ORAL NIGHTLY
Status: DISCONTINUED | OUTPATIENT
Start: 2021-02-15 | End: 2021-02-16 | Stop reason: HOSPADM

## 2021-02-14 RX ORDER — ESCITALOPRAM OXALATE 10 MG/1
20 TABLET ORAL DAILY
Status: DISCONTINUED | OUTPATIENT
Start: 2021-02-15 | End: 2021-02-16 | Stop reason: HOSPADM

## 2021-02-14 RX ORDER — ACETAMINOPHEN 650 MG/1
650 SUPPOSITORY RECTAL EVERY 6 HOURS PRN
Status: DISCONTINUED | OUTPATIENT
Start: 2021-02-14 | End: 2021-02-16 | Stop reason: HOSPADM

## 2021-02-14 RX ORDER — DILTIAZEM HYDROCHLORIDE 240 MG/1
240 CAPSULE, COATED, EXTENDED RELEASE ORAL ONCE
Status: COMPLETED | OUTPATIENT
Start: 2021-02-14 | End: 2021-02-14

## 2021-02-14 RX ORDER — POLYETHYLENE GLYCOL 3350 17 G/17G
17 POWDER, FOR SOLUTION ORAL DAILY PRN
Status: DISCONTINUED | OUTPATIENT
Start: 2021-02-14 | End: 2021-02-16 | Stop reason: HOSPADM

## 2021-02-14 RX ORDER — SODIUM CHLORIDE 0.9 % (FLUSH) 0.9 %
10 SYRINGE (ML) INJECTION PRN
Status: DISCONTINUED | OUTPATIENT
Start: 2021-02-14 | End: 2021-02-16 | Stop reason: HOSPADM

## 2021-02-14 RX ORDER — SODIUM CHLORIDE 0.9 % (FLUSH) 0.9 %
10 SYRINGE (ML) INJECTION EVERY 12 HOURS SCHEDULED
Status: DISCONTINUED | OUTPATIENT
Start: 2021-02-14 | End: 2021-02-16 | Stop reason: HOSPADM

## 2021-02-14 RX ORDER — BUSPIRONE HYDROCHLORIDE 15 MG/1
30 TABLET ORAL DAILY
Status: DISCONTINUED | OUTPATIENT
Start: 2021-02-14 | End: 2021-02-16 | Stop reason: HOSPADM

## 2021-02-14 RX ORDER — MORPHINE SULFATE 4 MG/ML
4 INJECTION, SOLUTION INTRAMUSCULAR; INTRAVENOUS EVERY 30 MIN PRN
Status: DISCONTINUED | OUTPATIENT
Start: 2021-02-14 | End: 2021-02-16 | Stop reason: HOSPADM

## 2021-02-14 RX ORDER — BUDESONIDE AND FORMOTEROL FUMARATE DIHYDRATE 160; 4.5 UG/1; UG/1
2 AEROSOL RESPIRATORY (INHALATION) 2 TIMES DAILY
Status: DISCONTINUED | OUTPATIENT
Start: 2021-02-14 | End: 2021-02-16 | Stop reason: HOSPADM

## 2021-02-14 RX ORDER — DEXTROSE MONOHYDRATE 25 G/50ML
12.5 INJECTION, SOLUTION INTRAVENOUS PRN
Status: DISCONTINUED | OUTPATIENT
Start: 2021-02-14 | End: 2021-02-16 | Stop reason: HOSPADM

## 2021-02-14 RX ORDER — HYDROCODONE BITARTRATE AND ACETAMINOPHEN 5; 325 MG/1; MG/1
1 TABLET ORAL EVERY 6 HOURS PRN
Status: DISCONTINUED | OUTPATIENT
Start: 2021-02-14 | End: 2021-02-16 | Stop reason: HOSPADM

## 2021-02-14 RX ORDER — MORPHINE SULFATE 2 MG/ML
2 INJECTION, SOLUTION INTRAMUSCULAR; INTRAVENOUS EVERY 4 HOURS PRN
Status: DISCONTINUED | OUTPATIENT
Start: 2021-02-14 | End: 2021-02-16 | Stop reason: HOSPADM

## 2021-02-14 RX ORDER — DEXTROSE MONOHYDRATE 50 MG/ML
100 INJECTION, SOLUTION INTRAVENOUS PRN
Status: DISCONTINUED | OUTPATIENT
Start: 2021-02-14 | End: 2021-02-16 | Stop reason: HOSPADM

## 2021-02-14 RX ORDER — SODIUM CHLORIDE 9 MG/ML
INJECTION, SOLUTION INTRAVENOUS CONTINUOUS
Status: ACTIVE | OUTPATIENT
Start: 2021-02-15 | End: 2021-02-15

## 2021-02-14 RX ORDER — ACETAMINOPHEN 325 MG/1
650 TABLET ORAL EVERY 6 HOURS PRN
Status: DISCONTINUED | OUTPATIENT
Start: 2021-02-14 | End: 2021-02-16 | Stop reason: HOSPADM

## 2021-02-14 RX ADMIN — SODIUM CHLORIDE, PRESERVATIVE FREE 10 ML: 5 INJECTION INTRAVENOUS at 21:41

## 2021-02-14 RX ADMIN — MAGNESIUM SULFATE IN DEXTROSE 1000 MG: 10 INJECTION, SOLUTION INTRAVENOUS at 23:24

## 2021-02-14 RX ADMIN — BUSPIRONE HYDROCHLORIDE 30 MG: 15 TABLET ORAL at 20:01

## 2021-02-14 RX ADMIN — SODIUM CHLORIDE: 9 INJECTION, SOLUTION INTRAVENOUS at 23:24

## 2021-02-14 RX ADMIN — IOPAMIDOL 75 ML: 755 INJECTION, SOLUTION INTRAVENOUS at 14:05

## 2021-02-14 RX ADMIN — MORPHINE SULFATE 2 MG: 2 INJECTION, SOLUTION INTRAMUSCULAR; INTRAVENOUS at 20:02

## 2021-02-14 RX ADMIN — THEOPHYLLINE ANHYDROUS 300 MG: 300 CAPSULE, EXTENDED RELEASE ORAL at 20:01

## 2021-02-14 RX ADMIN — HYDROCODONE BITARTRATE AND ACETAMINOPHEN 1 TABLET: 5; 325 TABLET ORAL at 21:32

## 2021-02-14 RX ADMIN — MORPHINE SULFATE 4 MG: 4 INJECTION, SOLUTION INTRAMUSCULAR; INTRAVENOUS at 13:36

## 2021-02-14 RX ADMIN — DILTIAZEM HYDROCHLORIDE 240 MG: 240 CAPSULE, EXTENDED RELEASE ORAL at 17:10

## 2021-02-14 RX ADMIN — SODIUM CHLORIDE 1000 ML: 9 INJECTION, SOLUTION INTRAVENOUS at 13:33

## 2021-02-14 RX ADMIN — ONDANSETRON 4 MG: 2 INJECTION INTRAMUSCULAR; INTRAVENOUS at 13:34

## 2021-02-14 ASSESSMENT — PAIN SCALES - GENERAL
PAINLEVEL_OUTOF10: 8
PAINLEVEL_OUTOF10: 0

## 2021-02-14 ASSESSMENT — PAIN DESCRIPTION - LOCATION
LOCATION: ABDOMEN

## 2021-02-14 ASSESSMENT — PAIN DESCRIPTION - ORIENTATION
ORIENTATION: LEFT

## 2021-02-14 NOTE — H&P
History and Physical      Name:  Sherry Barrios /Age/Sex:   (66 y.o. male)   MRN & CSN:  [id number] & [id number] Admission Date/Time: 2021 12:45 PM   Location:  ED20/ED-20 PCP: Emily Loco MD       Discussed patient with Dr. Crow Way and Plan:     Sherry Barrios is a 66 y.o.  male  who presents with abdominal pain    - LLQ abdominal pain  CT: Left (and Right) anterior hernia without evidence of obstruction  Denies n/v/d; had regular bowel fx this morning  Admission as pt still with mild but improved pain; ? Surgery tomorrow, per ED  Gen Surg consulted by ED, pt has already seen Dr Roxanne Roque for same issue  Lactic 2, repeat  NPO at MN; Gentle IVF at MN; pain control; holding abx- monitor WBC  Serial abdominal exams  Check INR, covid screen (? Surgery)    - Alcohol use  Reports 24 oz of beer per day; last yesterday  Denies hx of ETOH withdrawal  CIWA 0 in ED  monitor    - ?  Liver lesion  1 cm focus of increased echogenicity in R lobe of liver  Follow-up as outpatient; radiology recommends dedicated CT or MRI    Chronic  - COPD- Uses 2 L NC nocturnal only; no in exacerbation; cont home meds  - HLD- Cont Statin  - DM- Start SSI; hold home meds  - PAF- holding xarelto, cont dilt  - Depression with anxiety- cont lexapro and buspar    Discussed patient with ER physician     Diet NPO    DVT Prophylaxis [] Lovenox, []  Heparin, [x] SCDs, [] Ambulation    GI Prophylaxis [] PPI,  [] H2 Blocker,  [] Carafate,  [] Diet/Tube Feeds   Code Status Full   Disposition Patient requires continued admission due to    MDM [] Low, [x] Moderate,[]  High  Patient's risk as above due to      [] One or more chronic illnesses with exacerbation progression      [x] Two or more stable chronic illnesses      [x] Undiagnosed new problem with uncertain prognosis      [] Elective major surgery      []Prescription drug management       History of Present Illness:     Chief Complaint: Abdominal pain    Heavenly Cabezas Eddie Cruz is a 66 y.o.  male  who presents with the above complaints, onset today. Context is, patient has known hernia, was seen by Dr. Heavenly Simms, general surgeon, last fall. States he has had intermittent episodes of pain in the left lower abdomen that have resolved spontaneously since the hernia was diagnosed. He denies nausea vomiting diarrhea. Reports normal bowel function this morning. States his pain is much improved at time of exam.  Per ED staff, patient was going to be discharged home but due to still having some residual pain, patient and general surgery decided for admission. Questionable surgical repair in the morning, per ED staff patient denies fever, chills, chest pain/pressure, sob. He reports history of COPD, uses 2.5 L of oxygen nocturnally only. Denies increase in cough recently. Confirms full CODE STATUS. Reports 1-2 beers consumed daily. Denies history of alcohol withdrawal or seizures    Ten point ROS reviewed negative, unless as noted above    Objective:     No intake or output data in the 24 hours ending 02/14/21 1525     Vitals:   Vitals:    02/14/21 1501   BP: (!) 145/86   Pulse: 76   Resp: 25   Temp: 98.0   SpO2: 97%       Physical Exam:     GEN Awake male, sitting upright in bed in no apparent distress. Appears given age. EYES Pupils are equally round. No scleral erythema, discharge, or conjunctivitis. HENT Mucous membranes are moist. Oral pharynx without exudates, no evidence of thrush. NECK Supple, no apparent thyromegaly or masses. RESP Clear to auscultation, no wheezes, rales or rhonchi. Symmetric chest movement while on room air. CARDIO/VASC S1/S2 auscultated. Regular rate without appreciable murmurs, rubs, or gallops. No JVD or carotid bruits. Peripheral pulses equal bilaterally and palpable. No peripheral edema. GI Abdomen is soft without significant tenderness, masses, or guarding. Bowel sounds are normoactive. Rectal exam deferred.     No costovertebral angle tenderness. Díaz catheter is not present. HEME/LYMPH No palpable cervical lymphadenopathy and no hepatosplenomegaly. No petechiae or ecchymoses. MSK No gross joint deformities. Strength equal in BLE and BUE  SKIN Normal coloration, warm, dry. NEURO Cranial nerves appear grossly intact, normal speech, no lateralizing weakness. PSYCH Awake, alert, oriented x 4. Affect appropriate. Past Medical History:        Past Medical History:   Diagnosis Date    Alcohol abuse     Atrial flutter ([facility]     10/2006-s/p atrial ablation    [facility]     Diabetes mellitus [facility]     H/O 24 hour EKG monitoring 12/06, 11/06 12/06. 24 hr Holter monitor. Normal    H/O cardiovascular stress test 11/10, 12/07    11/10. EF 64%. Normal myocardial study    H/O echocardiogram 11/12/2013, 11/09 11/13 EF55% LA mild dilated, mild MR, mild-mod TR, mild pulm htn 11/09. EF >55%. The left atrium is mildly dilated.  History of cardiac monitoring 5/24/14    14 day Event-normal with no errythmia    History of cardioversion 4/21/2014    Hx of echocardiogram 4/15/2014    sclerotic aortic valve, moderate pulmonary HTN, EF45-50%    Hyperlipidemia     Hypertension     Obesity        PSHX:  has a past surgical history that includes Atrial ablation surgery (10/06); Pacemaker insertion (10/6/06); Infected skin debridement (3/12); and Colonoscopy (3/3/15). Allergies: No Known Allergies    FAM HX: family history includes High Blood Pressure in his brother.     Soc HX:   Social History     Socioeconomic History    Marital status:      Spouse name: None    Number of children: None    Years of education: None    Highest education level: None   Occupational History    None   Social Needs    Financial resource strain: None    Food insecurity     Worry: None     Inability: None    Transportation needs     Medical: None     Non-medical: None   Tobacco Use    Smoking status: Current Every Day Smoker     Packs/day: 0.50     Years: 40.00     Pack years: 20.00     Types: Cigarettes     Last attempt to quit: 2002     Years since quittin.7    Smokeless tobacco: Never Used   Substance and Sexual Activity    Alcohol use: Yes     Alcohol/week: 12.0 standard drinks     Types: 12 Cans of beer per week     Comment: occasionally     Drug use: No    Sexual activity: Yes     Partners: Female     Comment:    Lifestyle    Physical activity     Days per week: None     Minutes per session: None    Stress: None   Relationships    Social connections     Talks on phone: None     Gets together: None     Attends Congregation service: None     Active member of club or organization: None     Attends meetings of clubs or organizations: None     Relationship status: None    Intimate partner violence     Fear of current or ex partner: None     Emotionally abused: None     Physically abused: None     Forced sexual activity: None   Other Topics Concern    None   Social History Narrative    None       Medications:     Medications:    fluticasone-salmeterol (ADVAIR DISKUS) 500-50 MCG/DOSE diskus inhaler Inhale 1 puff into the lungs every 12 hours. Connie Edwards MD Needs Review   rivaroxaban (XARELTO) 20 MG TABS tablet Take 1 tablet by mouth Daily. **Pt started on 4/15/14 w/samples. ** Cesar Ramírez MD Needs Review   atorvastatin (LIPITOR) 40 MG tablet Take 40 mg by mouth daily.   Connie Edwards MD Needs Review   diltiazem (CARDIZEM CD) 240 MG ER capsule Take 240 mg by mouth daily.   Connie Edwards MD Needs Review   theophylline CR, 12 hour, (THEODUR) 300 MG CR tablet Take 300 mg by mouth 2 times daily.   Connie Edwards MD Needs Review   escitalopram (LEXAPRO) 20 MG tablet Take 20 mg by mouth daily.   Connie Edwards MD Needs Review   tiotropium (SPIRIVA HANDIHALER) 18 MCG inhalation capsule Inhale 18 mcg into the lungs daily.   Connie Edwards MD Needs Review metFORMIN (GLUCOPHAGE) 1000 MG tablet Take 1,000 mg by mouth 2 times daily (with meals) Historical Provider, MD Needs Review   carBAMazepine (TEGRETOL) 200 MG tablet Take 200 mg by mouth 3 times daily Historical MD Jerry Needs Review   busPIRone (BUSPAR) 30 MG tablet Take 30 mg by mouth daily  Historical MD Jerry Needs Review   tadalafil (CIALIS) 5 MG tablet Take 5 mg by mouth as needed for Erectile Dysfunction. Conine Edwards MD Needs Review   aspirin 81 MG tablet Take 81 mg by mouth daily.   Connie Edwards MD Needs Review   OXYGEN 2.5 L nightly.   Connie Edwards MD Needs Review       Data:     CT ABDOMEN PELVIS W IV CONTRAST Additional Contrast? None [[id number]] Collected: 02/14/21 1424      Order Status: Completed Updated: 02/14/21 1456     Narrative:       EXAMINATION:   CT OF THE ABDOMEN AND PELVIS WITH CONTRAST, 2/14/2021 2:00 pm     TECHNIQUE:   CT of the abdomen and pelvis was performed with the administration of   intravenous contrast. Multiplanar reformatted images are provided for review. Dose modulation, iterative reconstruction, and/or weight based adjustment of   the mA/kV was utilized to reduce the radiation dose to as low as reasonably   achievable. COMPARISON:   08/04/2017. HISTORY:   ORDERING SYSTEM PROVIDED HISTORY: LLQ abdominal pain, history of hernia, eval   for strangulated hernia. TECHNOLOGIST PROVIDED HISTORY:   Reason for exam:-  LQ abdominal pain, history of hernia, eval for   strangulated hernia. Additional Contrast?  None   Decision Support Exception:  Emergency Medical Condition (MA)   Reason for Exam: Lt sided pain, hx hernia   Acuity: Acute   Type of Exam: Initial   Additional signs and symptoms: 75 mL Isovue 370     FINDINGS:   Lower Chest: Scarring/atelectatic changes are seen at the lung bases. Scattered calcified granulomas of the lung bases are seen. Organs:  There is 1 cm focus of low echogenicity seen within the right lobe of the liver (axial image 46, too small to further characterize but density of   this lesion is not definitively of hepatic cyst.  There are tiny gallstones   without significant pericholecystic inflammatory changes seen.  Pancreas   appears atrophic.  The spleen is not enlarged.  The adrenal glands appear   within normal limits.  No evidence of nephrolithiasis or hydronephrosis is   seen.  Again seen are left-sided renal cysts.  There are also subcentimeter   foci of hypodensity, too small to further characterize, statistically benign. GI/Bowel: Evaluation of the GI tract is limited without the benefit of   enteric contrast.  Severe colonic diverticulosis is seen without specific   imaging features of acute diverticulitis seen.  The appendix is not   definitively visualized without convincing secondary signs of acute   appendicitis seen.  No evidence of bowel obstruction is seen. Inder Feil is a   small hiatal hernia. Pelvis: There is mild diffuse urinary bladder wall thickening, which may be   due to chronic outlet obstruction.  The seminal vesicles appear symmetric. The prostate appears prominent     Peritoneum/Retroperitoneum: No evidence of intra-abdominal free air is seen. No evidence of ascites is seen.  Severe vascular calcification changes are   seen.  The abdominal aorta appears normal in caliber. Bones/Soft Tissues: There is a left lateral anterior abdominal wall hernia   with defect measures approximately 2 cm in the transverse dimension (axial   image 107).   This contains a 2.2 cm fluid density versus a nondilated small   bowel loop.  In addition, there is right lateral fat and colonic loop   containing hernia (axial image 58) without evidence of obstruction seen.  No   evidence of upstream obstruction is seen at this point.  Multilevel   degenerative disc disease affects the thoracolumbar spine.      Impression:       Left lateral anterior abdominal wall hernia containing 2.2 cm fluid

## 2021-02-14 NOTE — ED NOTES
Hand off nurse report given to [facility] for room [address], RN  02/14/21 0321

## 2021-02-14 NOTE — ED PROVIDER NOTES
eMERGENCY dEPARTMENT eNCOUnter         [address] [facility] EMERGENCY DEPARTMENT     PCP: Kathleen Dougherty MD    CHIEF COMPLAINT    Chief Complaint   Patient presents with    Abdominal Pain     hernia on left side        HPI    Brandan Tang is a 66 y.o. male who presents with left lower quadrant abdominal pain. Onset was prior to arrival, since September 2020 but worsened this morning. Context is patient states that he was diagnosed with a hernia to the left lower abdomen/inguinal region. States he has been having waxing waning pain for the last several months as well as a palpable \"golf-sized ball\" in the left lower abdomen that sometimes will resolve and lay flat. Was seen by local general surgeon, Dr. Jesus Manuel Méndez, last Monday given his persistent waxing waning symptoms, and was told that he would need to get a CT abdomen pelvis which has not been completed. He was seen by [facility] on Friday who scheduled him to have a consult with a general surgeon through the [place] in [place] in 2 weeks but was told to come to the ED with any worsening pain. Not currently on any pain control or antibiotics. This morning, he had sudden onset of worsening pain, 10/10 in the left lower quadrant of the abdomen. He states that the golf ball is now very hard and firm but has not noted any discoloration over the bowel. He states he does continue to pass gas and has had otherwise normal bowel movements without black tarry stools or bright red blood per rectum. No nausea or vomiting. Pain worsens with palpation of the area and certain movements and position changes. No fever or chills. No dysuria hematuria. Patient has had previous appendectomy, no history of bowel obstructions. No previous hernia repair in the left lower quadrant. No dizziness or lightheadedness. Pain and swelling does not rating to the scrotum or testicles.         REVIEW OF SYSTEMS    Constitutional:  Denies fever, chills, weight loss or weakness   HENT:  Denies sore throat or ear pain   Cardiovascular:  Denies chest pain, palpitations or swelling   Respiratory:  Denies cough or shortness of breath   GI:  See HPI above  : No hematuria or dysuria. Musculoskeletal:  Denies back pain or groin pain or masses. No pain or swelling of extremities. Skin:  Denies rash  Neurologic:  Denies headache, focal weakness or sensory changes   Endocrine:  Denies polyuria or polydypsia   Lymphatic:  Denies swollen glands     All other review of systems are negative  See HPI and nursing notes for additional information     PAST MEDICAL & SURGICAL HISTORY    Past Medical History:   Diagnosis Date    Alcohol abuse     At[facility] flutter ([facility]     10/2006-s/p atrial ablation    [facility]     Diabetes mellitus ([facility]     H/O 24 hour EKG monitoring 12/06, 11/06 12/06. 24 hr Holter monitor. Normal    H/O cardiovascular stress test 11/10, 12/07    11/10. EF 64%. Normal myocardial study    H/O echocardiogram 11/12/2013, 11/09 11/13 EF55% LA mild dilated, mild MR, mild-mod TR, mild pulm htn 11/09. EF >55%. The left atrium is mildly dilated.  History of cardiac monitoring 5/24/14    14 day Event-normal with no errythmia    History of cardioversion 4/21/2014    Hx of echocardiogram 4/15/2014    sclerotic aortic valve, moderate pulmonary HTN, EF45-50%    Hyperlipidemia     Hypertension     Obesity      Past Surgical History:   Procedure Laterality Date    ATRIAL ABLATION SURGERY  10/06    COLONOSCOPY  3/3/15    severe divertics, multiple polyps, hem    INFECTED SKIN DEBRIDEMENT  3/12    PACEMAKER INSERTION  10/6/06    Bullous emphysema.  No change compared with the previous exam.       CURRENT MEDICATIONS    Current Outpatient Rx   Medication Sig Dispense Refill    fluticasone-umeclidin-vilant (TRELEGY ELLIPTA) 100-62.5-25 MCG/INH AEPB Inhale 1 puff into the lungs daily      busPIRone (BUSPAR) 30 MG tablet Take 30 mg by mouth daily       rivaroxaban (XARELTO) 20 MG TABS tablet Take 1 tablet by mouth Daily. **Pt started on 4/15/14 w/samples. ** 90 tablet 3    atorvastatin (LIPITOR) 40 MG tablet Take 40 mg by mouth daily.  diltiazem (CARDIZEM CD) 240 MG ER capsule Take 240 mg by mouth daily.  theophylline CR, 12 hour, (THEODUR) 300 MG CR tablet Take 300 mg by mouth 2 times daily.  escitalopram (LEXAPRO) 20 MG tablet Take 20 mg by mouth daily.  metFORMIN (GLUCOPHAGE) 1000 MG tablet Take 1,000 mg by mouth 2 times daily (with meals)      carBAMazepine (TEGRETOL) 200 MG tablet Take 200 mg by mouth 3 times daily      fluticasone-salmeterol (ADVAIR DISKUS) 500-50 MCG/DOSE diskus inhaler Inhale 1 puff into the lungs every 12 hours.  aspirin 81 MG tablet Take 81 mg by mouth daily.  OXYGEN 2.5 L nightly.  tiotropium (SPIRIVA HANDIHALER) 18 MCG inhalation capsule Inhale 18 mcg into the lungs daily. ALLERGIES    No Known Allergies    SOCIAL AND FAMILY HISTORY    Social History     Socioeconomic History    Marital status:      Spouse name: None    Number of children: None    Years of education: None    Highest education level: None   Occupational History    None   Social Needs    Financial resource strain: None    Food insecurity     Worry: None     Inability: None    Transportation needs     Medical: None     Non-medical: None   Tobacco Use    Smoking status: Current Every Day Smoker     Packs/day: 0.50     Years: 40.00     Pack years: 20.00     Types: Cigarettes     Last attempt to quit: 2002     Years since quittin.7    Smokeless tobacco: Never Used   Substance and Sexual Activity    Alcohol use:  Yes     Alcohol/week: 12.0 standard drinks     Types: 12 Cans of beer per week     Comment: occasionally     Drug use: No    Sexual activity: Yes     Partners: Female     Comment:    Lifestyle    Physical activity Days per week: None     Minutes per session: None    Stress: None   Relationships    Social connections     Talks on phone: None     Gets together: None     Attends Worship service: None     Active member of club or organization: None     Attends meetings of clubs or organizations: None     Relationship status: None    Intimate partner violence     Fear of current or ex partner: None     Emotionally abused: None     Physically abused: None     Forced sexual activity: None   Other Topics Concern    None   Social History Narrative    None     Family History   Problem Relation Age of Onset    High Blood Pressure Brother        PHYSICAL EXAM    VITAL SIGNS: BP (!) 145/86   Pulse 76   Temp 98 °F (36.7 °C) (Oral)   Resp 25   Ht 5' 10\" (1.778 m)   Wt 190 lb (86.2 kg)   SpO2 97%   BMI 27.26 kg/m²   General:  Well developed, thin elderly male, moderate acute pain, having spasming episodes of pain  Head:  Normocephalic, Atramautic  Neck/Lymphatics: Supple, no JVD, no swollen nodes  Respiratory:  Clear to ausculation bilaterally, No retractions, Non labored breathing  Cardiovascular:  RRR, No murmurs/gallops/thrills  GI:   No gross discoloration. Bowel sounds present in all quadrants, No audible bruits. Soft,  Nondistended. In the left lower quadrant, there is a firm mass, approximate the size of a golf ball without overlying skin changes or warmth. Increased tenderness with palpation of this area without palpable pulsatile mass. Increased bulging and pain with Valsalva maneuvers. Mass does not appear to self reduce. No other right-sided abdominal tenderness, rebound or guarding. No extension of swelling or firm mass into the left inguinal region. Back:  No CVA tenderness to percussion.   Musculoskeletal:  No edema, No deformity  Peripheral Vascular: Distal pulses 2+ equal bilaterally  Integument: No rash, Normal turgor  Neurologic:  Alert & oriented, Normal speech  Psychiatric: Cooperative, pleasant affect       I have reviewed and interpreted all of the currently available lab results from this visit (if applicable):  Results for orders placed or performed during the hospital encounter of 02/14/21   CBC auto diff   Result Value Ref Range    WBC 12.5 (H) 4.0 - 10.5 K/CU MM    RBC 4.22 (L) 4.6 - 6.2 M/CU MM    Hemoglobin 13.5 13.5 - 18.0 GM/DL    Hematocrit 42.9 42 - 52 %    .7 (H) 78 - 100 FL    MCH 32.0 (H) 27 - 31 PG    MCHC 31.5 (L) 32.0 - 36.0 %    RDW 13.6 11.7 - 14.9 %    Platelets 911 766 - 493 K/CU MM    MPV 8.8 7.5 - 11.1 FL    Differential Type AUTOMATED DIFFERENTIAL     Segs Relative 46.3 36 - 66 %    Lymphocytes % 44.0 24 - 44 %    Monocytes % 8.0 (H) 0 - 4 %    Eosinophils % 1.0 0 - 3 %    Basophils % 0.4 0 - 1 %    Segs Absolute 5.8 K/CU MM    Lymphocytes Absolute 5.5 K/CU MM    Monocytes Absolute 1.0 K/CU MM    Eosinophils Absolute 0.1 K/CU MM    Basophils Absolute 0.1 K/CU MM    Nucleated RBC % 0.0 %    Total Nucleated RBC 0.0 K/CU MM    Total Immature Neutrophil 0.04 K/CU MM    Immature Neutrophil % 0.3 0 - 0.43 %   CMP   Result Value Ref Range    Sodium 135 135 - 145 MMOL/L    Potassium 4.1 3.5 - 5.1 MMOL/L    Chloride 98 (L) 99 - 110 mMol/L    CO2 26 21 - 32 MMOL/L    BUN 8 6 - 23 MG/DL    CREATININE 1.0 0.9 - 1.3 MG/DL    Glucose 74 70 - 99 MG/DL    Calcium 9.0 8.3 - 10.6 MG/DL    Albumin 4.2 3.4 - 5.0 GM/DL    Total Protein 6.6 6.4 - 8.2 GM/DL    Total Bilirubin 0.3 0.0 - 1.0 MG/DL    ALT 14 10 - 40 U/L    AST 23 15 - 37 IU/L    Alkaline Phosphatase 62 40 - 128 IU/L    GFR Non-African American >60 >60 mL/min/1.73m2    GFR African American >60 >60 mL/min/1.73m2    Anion Gap 11 4 - 16   Lipase   Result Value Ref Range    Lipase 30 13 - 60 IU/L   Lactic Acid, Plasma   Result Value Ref Range    Lactate 2.0 0.4 - 2.0 mMOL/L   Urinalysis   Result Value Ref Range    Color, UA STRAW (A) YELLOW    Clarity, UA CLEAR CLEAR    Glucose, Urine NEGATIVE NEGATIVE MG/DL    Bilirubin Urine NEGATIVE performed with the administration of   intravenous contrast. Multiplanar reformatted images are provided for review. Dose modulation, iterative reconstruction, and/or weight based adjustment of   the mA/kV was utilized to reduce the radiation dose to as low as reasonably   achievable. COMPARISON:   08/04/2017. HISTORY:   ORDERING SYSTEM PROVIDED HISTORY: LLQ abdominal pain, history of hernia, eval   for strangulated hernia. TECHNOLOGIST PROVIDED HISTORY:   Reason for exam:-  LQ abdominal pain, history of hernia, eval for   strangulated hernia. Additional Contrast?  None   Decision Support Exception:  Emergency Medical Condition (MA)   Reason for Exam: Lt sided pain, hx hernia   Acuity: Acute   Type of Exam: Initial   Additional signs and symptoms: 75 mL Isovue 370     FINDINGS:   Lower Chest: Scarring/atelectatic changes are seen at the lung bases. Scattered calcified granulomas of the lung bases are seen. Organs: There is 1 cm focus of low echogenicity seen within the right lobe of   the liver (axial image 46, too small to further characterize but density of   this lesion is not definitively of hepatic cyst.  There are tiny gallstones   without significant pericholecystic inflammatory changes seen.  Pancreas   appears atrophic.  The spleen is not enlarged.  The adrenal glands appear   within normal limits.  No evidence of nephrolithiasis or hydronephrosis is   seen.  Again seen are left-sided renal cysts.  There are also subcentimeter   foci of hypodensity, too small to further characterize, statistically benign. GI/Bowel: Evaluation of the GI tract is limited without the benefit of   enteric contrast.  Severe colonic diverticulosis is seen without specific   imaging features of acute diverticulitis seen.  The appendix is not   definitively visualized without convincing secondary signs of acute   appendicitis seen.  No evidence of bowel obstruction is seen. Herchel Nishant is a   small hiatal hernia. Pelvis: There is mild diffuse urinary bladder wall thickening, which may be   due to chronic outlet obstruction.  The seminal vesicles appear symmetric. The prostate appears prominent     Peritoneum/Retroperitoneum: No evidence of intra-abdominal free air is seen. No evidence of ascites is seen.  Severe vascular calcification changes are   seen.  The abdominal aorta appears normal in caliber. Bones/Soft Tissues: There is a left lateral anterior abdominal wall hernia   with defect measures approximately 2 cm in the transverse dimension (axial   image 107).  This contains a 2.2 cm fluid density versus a nondilated small   bowel loop.  In addition, there is right lateral fat and colonic loop   containing hernia (axial image 58) without evidence of obstruction seen.  No   evidence of upstream obstruction is seen at this point.  Multilevel   degenerative disc disease affects the thoracolumbar spine.                 Preliminary result by Jessica Vicente (02/14/21 14:38:54)                Impression:    Left lateral anterior abdominal wall hernia containing 2.2 cm fluid density   versus nondilated small bowel loop (axial image 107).  No evidence of   upstream obstruction is seen; however, as there is a question of fluid within   the hernial sac correlate clinically with signs of reducibility and/or   strangulation. Additional right lateral abdominal hernia containing colonic loop (axial   image 58) without evidence of upstream obstruction seen. 1 cm focus of low echogenicity seen within the right lobe of the liver is too   small to further characterized but does not appear to be a hepatic cyst.   Consider follow-up CT or MRI with dedicated liver protocol for further   evaluation. Cholelithiasis without evidence of acute cholecystitis seen. Severe colonic diverticulosis without evidence of acute diverticulitis seen.      Mild diffuse urinary bladder wall thickening, which may be due to chronic outlet obstruction; however, consider correlation with urinalysis to rule out   underlying cystitis.                         ED COURSE & MEDICAL DECISION MAKING      Vital signs and nursing notes reviewed during ED course. I have independently evaluated this patient . Supervising MD - Dr Noy Morrell - present in the Emergency Department, available for consultation, throughout entirety of  patient care. All pertinent Lab data and radiographic results reviewed with patient at bedside. The patient and / or the family were informed of the results of any tests, a time was given to answer questions, a plan was proposed and they agreed with plan. Differential diagnosis: Abdominal Aortic Aneurysm, Ischemic Bowel, Bowel Obstruction, Acute Cholecystitis, Acute Appendicitis, other    Clinical  IMPRESSION    1. Abdominal wall hernia    2. Abdominal pain, left lower quadrant        Patient presents with acute on chronic left lower quad abdominal pain with known history of hernia. On exam, mildly uncomfortable appearing 73-year-old male, afebrile nontoxic, no acute distress. Abdominal exam reveals focal tenderness in left lower quadrant with a firm palpable mass without overlying skin changes or warmth. Increased bulging of the site with Valsalva maneuvers but does not appear to self reduce with palpation or laying down flat. No associate inguinal swelling. No other abdominal tenderness rebound or guarding. No CVA tenderness. Patient is kept n.p.o., start IV fluids, morphine and Zofran. CBC with leukocytosis 12.5 with left shift. Normal hemoglobin. CMP without significant derangement. Normal lipase. Lactic is 2.0.  UA is unremarkable. CT abdomen pelvis with IV contrast shows a left lateral anterior wall abdominal wall hernia containing 2.2 cm fluid density versus nondilated small bowel loop.   No evidence of upstream obstruction however there is question of fluid within the hernial sac which may correlate clinically to reducibility and/or strangulation. Also incidental finding for right lateral abdominal hernia containing colonic loop without evidence of obstruction. Incidental findings for echogenicity in the right lobe of the liver but does appear consistent with hepatic cyst and recommending for dedicated liver protocols. Also mild diffuse urinary bladder wall thickening but UA does not show any infectious findings concerning for cystitis. On reassessment, patient's pain is now a 0 out of 10. He is laying comfortably on the bed. Palpation of the left lower quadrant does still show some localized tenderness and guarding but I am not able to palpate the previously noted firm mass. Question that maybe hernia has reduced at this time but given patient's age and persistent left lower quadrant pain, will plan to consult with his general surgeon. I did consult with Dr. Hemalatha Gilmore - general surgery - and discussed patient's history, ED presentation/course including any pertinent laboratory findings and imaging study findings. He/she recommends offering admission for serial abdominal exams, pain control and general surgery follow-up in the morning versus discharge home with pain control and close follow-up outpatient. These options were discussed with patient at bedside. Although he is feeling improved with pain at rest, when he sits up in bed, he is having increased pain and he has s elected to stay for serial abdominal exams and continued pain control. I do think is reasonable. I did speak with hospice, Shellie Simmonds NP who agrees to hospital admission. Patient is admitted to the hospital in stable condition. In light of current events, I did utilize appropriate PPE (including N95 face mask, safety glasses, gloves as recommended by the health facility/national standard best practice, during my bedside interactions with the patient. Patient was masked throughout ED course.   Full droplet precaution as well as full PPE was followed throughout patient's ED course and evaluation. In consideration of current COVID19 pandemic, with effort to minimize unnecessary provider exposure, this patient was seen at bedside by me independently. However, in compliance with current hospital VIVIANE/ED protocol, prior to admission I did discuss this patient case with emergency department physician, Dr. Jesus Saint, who did agree with ED workup/evaluation and plan for admission. Of note, this Pt was NOT admitted to the ICU. Comment: Please note this report has been produced using speech recognition software and may contain errors related to that system including errors in grammar, punctuation, and spelling, as well as words and phrases that may be inappropriate. If there are any questions or concerns please feel free to contact the dictating provider for clarification.          Jaz García PA-C  02/14/21 [id number]

## 2021-02-14 NOTE — ED NOTES
1507 paged Dr Elmer Min covering Dr Avelina Panda.       Orpha Jody  02/14/21 1501  1501 Dr Elmer Min returned call      Rigoberto Vera  02/14/21 [id number]

## 2021-02-14 NOTE — ED TRIAGE NOTES
Pt presents to ED by EMS from home for severe abdominal pain that began this morning. Pt has a known hernia on the lower left side of abdomen and see's  for possible surgery.  Pt received 100mcg of fentanyl in route to ED

## 2021-02-14 NOTE — ED NOTES
Provider notified of pt's irregular hr and tachycardia. Pt has hx of afib and has not taken his medication this morning.  Pt denies chest pain or SOB      Guerita Malone RN  02/14/21 [id number]

## 2021-02-14 NOTE — ED NOTES
Spoke with  regarding pt's Cardizem medication for irregular hr. hospitalist stated it was ok to administer medication at this time     Kiley Angel RN  02/14/21 0192

## 2021-02-15 ENCOUNTER — APPOINTMENT (OUTPATIENT)
Dept: GENERAL RADIOLOGY | Age: 76
DRG: 394 | End: 2021-02-15
Payer: OTHER GOVERNMENT

## 2021-02-15 LAB
ANION GAP SERPL CALCULATED.3IONS-SCNC: 6 MMOL/L (ref 4–16)
BASE EXCESS MIXED: 5 (ref 0–1.2)
BASOPHILS ABSOLUTE: 0 K/CU MM
BASOPHILS RELATIVE PERCENT: 0.3 % (ref 0–1)
BUN BLDV-MCNC: 9 MG/DL (ref 6–23)
CALCIUM SERPL-MCNC: 8.8 MG/DL (ref 8.3–10.6)
CARBON MONOXIDE, BLOOD: 2.2 % (ref 0–5)
CHLORIDE BLD-SCNC: 101 MMOL/L (ref 99–110)
CO2 CONTENT: 30.3 MMOL/L (ref 19–24)
CO2: 27 MMOL/L (ref 21–32)
COMMENT: ABNORMAL
CREAT SERPL-MCNC: 1.1 MG/DL (ref 0.9–1.3)
DIFFERENTIAL TYPE: ABNORMAL
EOSINOPHILS ABSOLUTE: 0.2 K/CU MM
EOSINOPHILS RELATIVE PERCENT: 1.5 % (ref 0–3)
GFR AFRICAN AMERICAN: >60 ML/MIN/1.73M2
GFR NON-AFRICAN AMERICAN: >60 ML/MIN/1.73M2
GLUCOSE BLD-MCNC: 131 MG/DL (ref 70–99)
GLUCOSE BLD-MCNC: 173 MG/DL (ref 70–99)
GLUCOSE BLD-MCNC: 78 MG/DL (ref 70–99)
GLUCOSE BLD-MCNC: 86 MG/DL (ref 70–99)
GLUCOSE BLD-MCNC: 86 MG/DL (ref 70–99)
GLUCOSE BLD-MCNC: 98 MG/DL (ref 70–99)
HCO3 ARTERIAL: 29.1 MMOL/L (ref 18–23)
HCT VFR BLD CALC: 42.4 % (ref 42–52)
HEMOGLOBIN: 13.3 GM/DL (ref 13.5–18)
IMMATURE NEUTROPHIL %: 0.3 % (ref 0–0.43)
LYMPHOCYTES ABSOLUTE: 4.9 K/CU MM
LYMPHOCYTES RELATIVE PERCENT: 45.9 % (ref 24–44)
MCH RBC QN AUTO: 32.1 PG (ref 27–31)
MCHC RBC AUTO-ENTMCNC: 31.4 % (ref 32–36)
MCV RBC AUTO: 102.4 FL (ref 78–100)
METHEMOGLOBIN ARTERIAL: 1.2 %
MONOCYTES ABSOLUTE: 0.7 K/CU MM
MONOCYTES RELATIVE PERCENT: 6.8 % (ref 0–4)
NUCLEATED RBC %: 0 %
O2 SATURATION: 89.5 % (ref 96–97)
PCO2 ARTERIAL: 40 MMHG (ref 32–45)
PDW BLD-RTO: 13.9 % (ref 11.7–14.9)
PH BLOOD: 7.47 (ref 7.34–7.45)
PLATELET # BLD: 169 K/CU MM (ref 140–440)
PMV BLD AUTO: 9.3 FL (ref 7.5–11.1)
PO2 ARTERIAL: 55 MMHG (ref 75–100)
POTASSIUM SERPL-SCNC: 4.2 MMOL/L (ref 3.5–5.1)
RAPID INFLUENZA  B AGN: NEGATIVE
RAPID INFLUENZA A AGN: NEGATIVE
RBC # BLD: 4.14 M/CU MM (ref 4.6–6.2)
SEGMENTED NEUTROPHILS ABSOLUTE COUNT: 4.8 K/CU MM
SEGMENTED NEUTROPHILS RELATIVE PERCENT: 45.2 % (ref 36–66)
SODIUM BLD-SCNC: 134 MMOL/L (ref 135–145)
TOTAL IMMATURE NEUTOROPHIL: 0.03 K/CU MM
TOTAL NUCLEATED RBC: 0 K/CU MM
WBC # BLD: 10.7 K/CU MM (ref 4–10.5)

## 2021-02-15 PROCEDURE — 87804 INFLUENZA ASSAY W/OPTIC: CPT

## 2021-02-15 PROCEDURE — 93010 ELECTROCARDIOGRAM REPORT: CPT | Performed by: INTERNAL MEDICINE

## 2021-02-15 PROCEDURE — 85025 COMPLETE CBC W/AUTO DIFF WBC: CPT

## 2021-02-15 PROCEDURE — 6370000000 HC RX 637 (ALT 250 FOR IP): Performed by: INTERNAL MEDICINE

## 2021-02-15 PROCEDURE — 2700000000 HC OXYGEN THERAPY PER DAY

## 2021-02-15 PROCEDURE — 1200000000 HC SEMI PRIVATE

## 2021-02-15 PROCEDURE — 94761 N-INVAS EAR/PLS OXIMETRY MLT: CPT

## 2021-02-15 PROCEDURE — 82803 BLOOD GASES ANY COMBINATION: CPT

## 2021-02-15 PROCEDURE — 6370000000 HC RX 637 (ALT 250 FOR IP): Performed by: NURSE PRACTITIONER

## 2021-02-15 PROCEDURE — 94640 AIRWAY INHALATION TREATMENT: CPT

## 2021-02-15 PROCEDURE — 80048 BASIC METABOLIC PNL TOTAL CA: CPT

## 2021-02-15 PROCEDURE — 93005 ELECTROCARDIOGRAM TRACING: CPT | Performed by: INTERNAL MEDICINE

## 2021-02-15 PROCEDURE — 2580000003 HC RX 258: Performed by: NURSE PRACTITIONER

## 2021-02-15 PROCEDURE — 36415 COLL VENOUS BLD VENIPUNCTURE: CPT

## 2021-02-15 PROCEDURE — 82962 GLUCOSE BLOOD TEST: CPT

## 2021-02-15 PROCEDURE — 6360000002 HC RX W HCPCS: Performed by: INTERNAL MEDICINE

## 2021-02-15 PROCEDURE — 94762 N-INVAS EAR/PLS OXIMTRY CONT: CPT

## 2021-02-15 PROCEDURE — 71046 X-RAY EXAM CHEST 2 VIEWS: CPT

## 2021-02-15 RX ORDER — GUAIFENESIN 600 MG/1
600 TABLET, EXTENDED RELEASE ORAL 2 TIMES DAILY
Status: DISCONTINUED | OUTPATIENT
Start: 2021-02-15 | End: 2021-02-16 | Stop reason: HOSPADM

## 2021-02-15 RX ORDER — PREDNISONE 20 MG/1
40 TABLET ORAL DAILY
Status: DISCONTINUED | OUTPATIENT
Start: 2021-02-15 | End: 2021-02-16 | Stop reason: HOSPADM

## 2021-02-15 RX ORDER — ALBUTEROL SULFATE 90 UG/1
2 AEROSOL, METERED RESPIRATORY (INHALATION) EVERY 4 HOURS
Status: DISCONTINUED | OUTPATIENT
Start: 2021-02-15 | End: 2021-02-16 | Stop reason: HOSPADM

## 2021-02-15 RX ADMIN — DILTIAZEM HYDROCHLORIDE 240 MG: 240 CAPSULE, EXTENDED RELEASE ORAL at 20:01

## 2021-02-15 RX ADMIN — ATORVASTATIN CALCIUM 40 MG: 40 TABLET, FILM COATED ORAL at 20:01

## 2021-02-15 RX ADMIN — PREDNISONE 40 MG: 20 TABLET ORAL at 15:04

## 2021-02-15 RX ADMIN — ALBUTEROL SULFATE 2 PUFF: 90 AEROSOL, METERED RESPIRATORY (INHALATION) at 19:20

## 2021-02-15 RX ADMIN — ESCITALOPRAM OXALATE 20 MG: 10 TABLET ORAL at 08:14

## 2021-02-15 RX ADMIN — HYDROCODONE BITARTRATE AND ACETAMINOPHEN 1 TABLET: 5; 325 TABLET ORAL at 08:37

## 2021-02-15 RX ADMIN — HYDROCODONE BITARTRATE AND ACETAMINOPHEN 1 TABLET: 5; 325 TABLET ORAL at 20:02

## 2021-02-15 RX ADMIN — ALBUTEROL SULFATE 2 PUFF: 90 AEROSOL, METERED RESPIRATORY (INHALATION) at 15:52

## 2021-02-15 RX ADMIN — THEOPHYLLINE ANHYDROUS 300 MG: 300 CAPSULE, EXTENDED RELEASE ORAL at 08:14

## 2021-02-15 RX ADMIN — SODIUM CHLORIDE, PRESERVATIVE FREE 10 ML: 5 INJECTION INTRAVENOUS at 20:02

## 2021-02-15 RX ADMIN — THEOPHYLLINE ANHYDROUS 300 MG: 300 CAPSULE, EXTENDED RELEASE ORAL at 20:02

## 2021-02-15 RX ADMIN — GUAIFENESIN 600 MG: 600 TABLET, EXTENDED RELEASE ORAL at 20:01

## 2021-02-15 RX ADMIN — ENOXAPARIN SODIUM 40 MG: 40 INJECTION SUBCUTANEOUS at 15:06

## 2021-02-15 RX ADMIN — BUDESONIDE AND FORMOTEROL FUMARATE DIHYDRATE 2 PUFF: 160; 4.5 AEROSOL RESPIRATORY (INHALATION) at 08:19

## 2021-02-15 RX ADMIN — BUDESONIDE AND FORMOTEROL FUMARATE DIHYDRATE 2 PUFF: 160; 4.5 AEROSOL RESPIRATORY (INHALATION) at 19:20

## 2021-02-15 RX ADMIN — SODIUM CHLORIDE, PRESERVATIVE FREE 10 ML: 5 INJECTION INTRAVENOUS at 08:15

## 2021-02-15 RX ADMIN — TIOTROPIUM BROMIDE INHALATION SPRAY 2 PUFF: 3.12 SPRAY, METERED RESPIRATORY (INHALATION) at 08:20

## 2021-02-15 RX ADMIN — BUSPIRONE HYDROCHLORIDE 30 MG: 15 TABLET ORAL at 17:44

## 2021-02-15 RX ADMIN — GUAIFENESIN 600 MG: 600 TABLET, EXTENDED RELEASE ORAL at 15:04

## 2021-02-15 ASSESSMENT — PAIN DESCRIPTION - DESCRIPTORS: DESCRIPTORS: STABBING

## 2021-02-15 ASSESSMENT — PAIN DESCRIPTION - PAIN TYPE: TYPE: ACUTE PAIN

## 2021-02-15 ASSESSMENT — PAIN SCALES - GENERAL
PAINLEVEL_OUTOF10: 0
PAINLEVEL_OUTOF10: 5
PAINLEVEL_OUTOF10: 0
PAINLEVEL_OUTOF10: 2
PAINLEVEL_OUTOF10: 0
PAINLEVEL_OUTOF10: 4

## 2021-02-15 ASSESSMENT — PAIN DESCRIPTION - LOCATION
LOCATION: ABDOMEN
LOCATION: ABDOMEN

## 2021-02-15 ASSESSMENT — PAIN DESCRIPTION - ONSET: ONSET: GRADUAL

## 2021-02-15 NOTE — CARE COORDINATION
CM in to see Pt to discuss discharge planning. Pt is from home alone and plans to return. Pt states his sone will provide transportation at discharge. Pt states he is independent with ADL's and uses no DME. Pt denies need for [address] at this time. Pt has insurance, pcp, and can afford medications. Pt denies any needs at this time. CM available if needs arise.

## 2021-02-15 NOTE — PROGRESS NOTES
Pt seen and examined. Consult to follow. No SBO. Pain has now resolved. No acute surgical issues. ADAT. Can do surgery as outpt. OK to discharge home . I will get surgery scheduled and notify pt.

## 2021-02-15 NOTE — PROGRESS NOTES
to go home today, but he was hypoxic    Objective: Intake/Output Summary (Last 24 hours) at 2/15/2021 1317  Last data filed at 2/15/2021 0240  Gross per 24 hour   Intake --   Output 1200 ml   Net -1200 ml      Vitals:   Vitals:    02/15/21 1130   BP:    Pulse:    Resp:    Temp:    SpO2: 90%     Physical Exam:      Physical Exam  Pulmonary:      Effort: Pulmonary effort is normal.   Skin:     Coloration: Skin is not jaundiced. Neurological:      Mental Status: He is alert. Cranial Nerves: No cranial nerve deficit.            Medications:   Medications:    enoxaparin  40 mg Subcutaneous Daily    predniSONE  40 mg Oral Daily    sodium chloride flush  10 mL Intravenous 2 times per day    insulin lispro  0-6 Units Subcutaneous TID WC    insulin lispro  0-3 Units Subcutaneous Nightly    atorvastatin  40 mg Oral Nightly    busPIRone  30 mg Oral Daily    escitalopram  20 mg Oral Daily    theophylline  300 mg Oral BID    dilTIAZem  240 mg Oral Nightly    tiotropium  2 puff Inhalation Daily    And    budesonide-formoterol  2 puff Inhalation BID      Infusions:    dextrose       PRN Meds:     morphine, 4 mg, Q30 Min PRN      ondansetron, 4 mg, Q30 Min PRN      sodium chloride flush, 10 mL, PRN      promethazine, 12.5 mg, Q6H PRN    Or      ondansetron, 4 mg, Q6H PRN      polyethylene glycol, 17 g, Daily PRN      acetaminophen, 650 mg, Q6H PRN    Or      acetaminophen, 650 mg, Q6H PRN      morphine, 2 mg, Q4H PRN    Or      morphine, 4 mg, Q4H PRN      HYDROcodone 5 mg - acetaminophen, 1 tablet, Q6H PRN      glucose, 15 g, PRN      dextrose, 12.5 g, PRN      glucagon (rDNA), 1 mg, PRN      dextrose, 100 mL/hr, PRN      magnesium sulfate, 1,000 mg, PRN          Electronically signed by Bill Wheeler MD on 2/15/2021 at 1:17 PM

## 2021-02-16 VITALS
HEIGHT: 69 IN | WEIGHT: 187.13 LBS | BODY MASS INDEX: 27.72 KG/M2 | DIASTOLIC BLOOD PRESSURE: 86 MMHG | SYSTOLIC BLOOD PRESSURE: 148 MMHG | TEMPERATURE: 97.6 F | RESPIRATION RATE: 22 BRPM | OXYGEN SATURATION: 92 % | HEART RATE: 78 BPM

## 2021-02-16 LAB
BASOPHILS ABSOLUTE: 0 K/CU MM
BASOPHILS RELATIVE PERCENT: 0.2 % (ref 0–1)
DIFFERENTIAL TYPE: ABNORMAL
DOSE AMOUNT: ABNORMAL
DOSE TIME: ABNORMAL
EOSINOPHILS ABSOLUTE: 0 K/CU MM
EOSINOPHILS RELATIVE PERCENT: 0 % (ref 0–3)
GLUCOSE BLD-MCNC: 104 MG/DL (ref 70–99)
HCT VFR BLD CALC: 44.3 % (ref 42–52)
HEMOGLOBIN: 14 GM/DL (ref 13.5–18)
IMMATURE NEUTROPHIL %: 0.6 % (ref 0–0.43)
LYMPHOCYTES ABSOLUTE: 3.4 K/CU MM
LYMPHOCYTES RELATIVE PERCENT: 27.4 % (ref 24–44)
MCH RBC QN AUTO: 32.3 PG (ref 27–31)
MCHC RBC AUTO-ENTMCNC: 31.6 % (ref 32–36)
MCV RBC AUTO: 102.1 FL (ref 78–100)
MONOCYTES ABSOLUTE: 0.7 K/CU MM
MONOCYTES RELATIVE PERCENT: 5.8 % (ref 0–4)
NUCLEATED RBC %: 0 %
PDW BLD-RTO: 13.4 % (ref 11.7–14.9)
PLATELET # BLD: 155 K/CU MM (ref 140–440)
PMV BLD AUTO: 9.2 FL (ref 7.5–11.1)
RBC # BLD: 4.34 M/CU MM (ref 4.6–6.2)
SEGMENTED NEUTROPHILS ABSOLUTE COUNT: 8.2 K/CU MM
SEGMENTED NEUTROPHILS RELATIVE PERCENT: 66 % (ref 36–66)
THEOPHYLLINE LEVEL: 5.2 UG/ML (ref 10–20)
TOTAL IMMATURE NEUTOROPHIL: 0.07 K/CU MM
TOTAL NUCLEATED RBC: 0 K/CU MM
WBC # BLD: 12.5 K/CU MM (ref 4–10.5)

## 2021-02-16 PROCEDURE — 80198 ASSAY OF THEOPHYLLINE: CPT

## 2021-02-16 PROCEDURE — 6370000000 HC RX 637 (ALT 250 FOR IP): Performed by: INTERNAL MEDICINE

## 2021-02-16 PROCEDURE — 94640 AIRWAY INHALATION TREATMENT: CPT

## 2021-02-16 PROCEDURE — 84145 PROCALCITONIN (PCT): CPT

## 2021-02-16 PROCEDURE — 6360000002 HC RX W HCPCS: Performed by: INTERNAL MEDICINE

## 2021-02-16 PROCEDURE — 36415 COLL VENOUS BLD VENIPUNCTURE: CPT

## 2021-02-16 PROCEDURE — 82962 GLUCOSE BLOOD TEST: CPT

## 2021-02-16 PROCEDURE — 94761 N-INVAS EAR/PLS OXIMETRY MLT: CPT

## 2021-02-16 PROCEDURE — 2580000003 HC RX 258: Performed by: NURSE PRACTITIONER

## 2021-02-16 PROCEDURE — 85025 COMPLETE CBC W/AUTO DIFF WBC: CPT

## 2021-02-16 PROCEDURE — 2700000000 HC OXYGEN THERAPY PER DAY

## 2021-02-16 PROCEDURE — 6370000000 HC RX 637 (ALT 250 FOR IP): Performed by: NURSE PRACTITIONER

## 2021-02-16 RX ORDER — PREDNISONE 10 MG/1
TABLET ORAL
Qty: 18 TABLET | Refills: 0 | Status: SHIPPED | OUTPATIENT
Start: 2021-02-16

## 2021-02-16 RX ADMIN — ESCITALOPRAM OXALATE 20 MG: 10 TABLET ORAL at 09:31

## 2021-02-16 RX ADMIN — PREDNISONE 40 MG: 20 TABLET ORAL at 09:31

## 2021-02-16 RX ADMIN — ALBUTEROL SULFATE 2 PUFF: 90 AEROSOL, METERED RESPIRATORY (INHALATION) at 07:45

## 2021-02-16 RX ADMIN — ENOXAPARIN SODIUM 40 MG: 40 INJECTION SUBCUTANEOUS at 09:31

## 2021-02-16 RX ADMIN — TIOTROPIUM BROMIDE INHALATION SPRAY 2 PUFF: 3.12 SPRAY, METERED RESPIRATORY (INHALATION) at 07:45

## 2021-02-16 RX ADMIN — BUDESONIDE AND FORMOTEROL FUMARATE DIHYDRATE 2 PUFF: 160; 4.5 AEROSOL RESPIRATORY (INHALATION) at 07:45

## 2021-02-16 RX ADMIN — THEOPHYLLINE ANHYDROUS 300 MG: 300 CAPSULE, EXTENDED RELEASE ORAL at 09:31

## 2021-02-16 RX ADMIN — GUAIFENESIN 600 MG: 600 TABLET, EXTENDED RELEASE ORAL at 09:31

## 2021-02-16 RX ADMIN — SODIUM CHLORIDE, PRESERVATIVE FREE 10 ML: 5 INJECTION INTRAVENOUS at 09:30

## 2021-02-16 ASSESSMENT — PAIN SCALES - GENERAL: PAINLEVEL_OUTOF10: 0

## 2021-02-16 NOTE — PLAN OF CARE
Problem: Falls - Risk of:  Goal: Will remain free from falls  Description: Will remain free from falls  2/16/2021 1105 by Arnie Louise  Outcome: Completed  2/16/2021 0037 by Willem Hu RN  Outcome: Ongoing  Goal: Absence of physical injury  Description: Absence of physical injury  2/16/2021 1105 by Arnie Louise  Outcome: Completed  2/16/2021 0037 by Willem Hu RN  Outcome: Ongoing     Problem: Cardiac:  Goal: Ability to maintain vital signs within normal range will improve  Description: Ability to maintain vital signs within normal range will improve  2/16/2021 1105 by Arnie Louise  Outcome: Completed  2/16/2021 0037 by Willem Hu RN  Outcome: Ongoing  Goal: Cardiovascular alteration will improve  Description: Cardiovascular alteration will improve  2/16/2021 1105 by rAnie Louise  Outcome: Completed  2/16/2021 0037 by Willem Hu RN  Outcome: Ongoing     Problem: Health Behavior:  Goal: Will modify at least one risk factor affecting health status  Description: Will modify at least one risk factor affecting health status  2/16/2021 1105 by Arnie Louise  Outcome: Completed  2/16/2021 0037 by Willem Hu RN  Outcome: Ongoing  Goal: Identification of resources available to assist in meeting health care needs will improve  Description: Identification of resources available to assist in meeting health care needs will improve  2/16/2021 1105 by Arnie Louise  Outcome: Completed  2/16/2021 0037 by Willem Hu RN  Outcome: Ongoing     Problem: Physical Regulation:  Goal: Complications related to the disease process, condition or treatment will be avoided or minimized  Description: Complications related to the disease process, condition or treatment will be avoided or minimized  2/16/2021 1105 by Arnie Louise  Outcome: Completed  2/16/2021 0037 by Willem Hu RN  Outcome: Ongoing     Problem: Pain:  Goal: Pain level will decrease  Description: Pain level will decrease  2/16/2021 1105 by Arnie Louise Outcome: Completed  2/16/2021 0037 by Pancho Bobby RN  Outcome: Ongoing  Goal: Control of acute pain  Description: Control of acute pain  2/16/2021 1105 by Karlene Catalan  Outcome: Completed  2/16/2021 0037 by Pancho Bobby RN  Outcome: Ongoing  Goal: Control of chronic pain  Description: Control of chronic pain  2/16/2021 1105 by Karlene Catalan  Outcome: Completed  2/16/2021 0037 by Pancho Bobby RN  Outcome: Ongoing

## 2021-02-16 NOTE — DISCHARGE SUMMARY
Discharge Summary    Name:  Alon Tarango /Age/Sex:   (66 y.o. male)   MRN & CSN:  [id number] & [id number] Admission Date/Time: 2021 12:45 PM   Attending:  Maryanne Pena MD Discharging Physician: John Harmon MD     HPI:     Alon Tarango is a 66 y.o.  male  who presents with the above complaints, onset today. Context is, patient has known hernia, was seen by Dr. Heavenly Simms, general surgeon, last fall. States he has had intermittent episodes of pain in the left lower abdomen that have resolved spontaneously since the hernia was diagnosed. He denies nausea vomiting diarrhea. Reports normal bowel function this morning. States his pain is much improved at time of exam.  Per ED staff, patient was going to be discharged home but due to still having some residual pain, patient and general surgery decided for admission. Questionable surgical repair in the morning, per ED staff patient denies fever, chills, chest pain/pressure, sob. He reports history of COPD, uses 2.5 L of oxygen nocturnally only. Denies increase in cough recently. Confirms full CODE STATUS. Reports 1-2 beers consumed daily. Denies history of alcohol withdrawal or seizures    Hospital Course:   Alon Tarango is a 66 y.o.  male  who presents with Abdominal hernia    >Left lower quadrant pain  -Has left abdominal wall hernia,Dr. Kimball's consulted  -No SBO per surgery  - pain resolved, tolerated po intake, cleared by surgery and pt was discharged home in a stable condition.      >COPD, severe with possible exacerbation  >Chronic cor pulmonale  >Chronic respiratory failure  >? Hypoxia episode?  Sensor error  - hypoxia on meter, denied shortness of breath  -On Trelegy and theophylline as outpatient, sees Dr. Karly Bella but has not followed with him for several years -consulted  - started oral prednisone, pt stable at baseline home oxygen, cleared by Pulm and was discharged home in a stable condition.       Other started on 4/15/14 w/samples. **             theophylline CR, 12 hour, (THEODUR) 300 MG CR tablet  Take 300 mg by mouth 2 times daily. Objective Findings at Discharge:   BP (!) 148/76   Pulse 92   Temp 97.6 °F (36.4 °C) (Oral)   Resp 26   Ht 5' 9\" (1.753 m)   Wt 187 lb 2 oz (84.9 kg)   SpO2 94%   BMI 27.63 kg/m²            PHYSICAL EXAM   GEN Awake male, cooperative, no apparent distress. RESP Clear . Symmetric chest movement . CARDIO/VASC S1/S2 auscultated. Regular rate. GI Abdomen is soft without significant tenderness, Bowel sounds are normoactive. MSK No gross joint deformities. Spontaneous movement of all extremities  SKIN Normal coloration, warm, dry. NEURO normal speech, no lateralizing weakness. PSYCH Awake, alert, oriented x 4. Affect appropriate. BMP/CBC  Recent Labs     02/14/21  1250 02/15/21  0540 02/16/21  0531    134*  --    K 4.1 4.2  --    CL 98* 101  --    CO2 26 27  --    BUN 8 9  --    CREATININE 1.0 1.1  --    WBC 12.5* 10.7* 12.5*   HCT 42.9 42.4 44.3    169 155       IMAGING:  Xr Chest (2 Vw)    Result Date: 2/15/2021  EXAMINATION: TWO XRAY VIEWS OF THE CHEST 2/15/2021 1:05 pm COMPARISON: CT chest with contrast 08/04/2017 HISTORY: ORDERING SYSTEM PROVIDED HISTORY: hypoxia TECHNOLOGIST PROVIDED HISTORY: Reason for exam:->hypoxia Reason for Exam: hypoxia FINDINGS: Normal heart size and pulmonary vasculature. No focal consolidation, pleural effusion, or pneumothorax. Lucent upper lungs compatible with known severe emphysematous changes. No evidence of acute process. Severe emphysema. Ct Abdomen Pelvis W Iv Contrast Additional Contrast? None    Result Date: 2/16/2021  EXAMINATION: CT OF THE ABDOMEN AND PELVIS WITH CONTRAST, 2/14/2021 2:00 pm TECHNIQUE: CT of the abdomen and pelvis was performed with the administration of intravenous contrast. Multiplanar reformatted images are provided for review.  Dose modulation, iterative reconstruction, and/or weight based adjustment of the mA/kV was utilized to reduce the radiation dose to as low as reasonably achievable. COMPARISON: 08/04/2017. HISTORY: ORDERING SYSTEM PROVIDED HISTORY: LLQ abdominal pain, history of hernia, eval for strangulated hernia. TECHNOLOGIST PROVIDED HISTORY: Reason for exam:-  LQ abdominal pain, history of hernia, eval for strangulated hernia. Additional Contrast?  None Decision Support Exception:  Emergency Medical Condition (MA) Reason for Exam: Lt sided pain, hx hernia Acuity: Acute Type of Exam: Initial Additional signs and symptoms: 75 mL Isovue 370 FINDINGS: Lower Chest: Scarring/atelectatic changes are seen at the lung bases. Scattered calcified granulomas of the lung bases are seen. Organs: There is 1 cm focus of low echogenicity seen within the right lobe of the liver (axial image 46, too small to further characterize but density of this lesion is not definitively of hepatic cyst.  There are tiny gallstones without significant pericholecystic inflammatory changes seen. Prominence of the biliary ductal system may be related to age-related changes pancreas appears atrophic. The spleen is not enlarged. The adrenal glands appear within normal limits. No evidence of nephrolithiasis or hydronephrosis is seen. Again seen are left-sided renal cysts. There are also subcentimeter foci of hypodensity, too small to further characterize, statistically benign. GI/Bowel: Evaluation of the GI tract is limited without the benefit of enteric contrast.  Severe colonic diverticulosis is seen without specific imaging features of acute diverticulitis seen. The appendix is not definitively visualized without convincing secondary signs of acute appendicitis seen. No evidence of bowel obstruction is seen. There is a small hiatal hernia. Pelvis: There is mild diffuse urinary bladder wall thickening, which may be due to chronic outlet obstruction.   The seminal vesicles appear symmetric. The prostate appears prominent Peritoneum/Retroperitoneum: No evidence of intra-abdominal free air is seen. No evidence of ascites is seen. Severe vascular calcification changes are seen. The abdominal aorta appears normal in caliber. Bones/Soft Tissues: There is a left lateral anterior abdominal wall hernia with defect measures approximately 2 cm in the transverse dimension (axial image 107). This contains a 2.2 cm fluid density versus a nondilated small bowel loop. In addition, there is right lateral fat and colonic loop containing hernia (axial image 58) without evidence of obstruction seen. No evidence of upstream obstruction is seen at this point. Multilevel degenerative disc disease affects the thoracolumbar spine. Left lateral anterior abdominal wall hernia containing 2.2 cm fluid density versus nondilated small bowel loop (axial image 107). No evidence of upstream obstruction is seen; however, as there is a question of fluid within the hernial sac correlate clinically with signs of reducibility and/or strangulation. Additional right lateral abdominal hernia containing colonic loop (axial image 58) without evidence of upstream obstruction seen. 1 cm focus of low echogenicity seen within the right lobe of the liver is too small to further characterized but does not appear to be a hepatic cyst. Consider follow-up CT or MRI with dedicated liver protocol for further evaluation. Cholelithiasis without evidence of acute cholecystitis seen. Severe colonic diverticulosis without evidence of acute diverticulitis seen. Mild diffuse urinary bladder wall thickening, which may be due to chronic outlet obstruction; however, consider correlation with urinalysis to rule out underlying cystitis.        Discharge Time of 37 minutes    Electronically signed by Emilia Mckinney MD on 2/16/2021 at 10:52 AM

## 2021-02-16 NOTE — PROGRESS NOTES
CLINICAL PHARMACY NOTE: MEDS TO [address] Select Patient?: No  Total # of Prescriptions Filled: 1   The following medications were delivered to the patient:  · Prednisone 10mg  Total # of Interventions Completed: 0  Time Spent (min): 15    Additional Documentation:

## 2021-02-16 NOTE — CONSULTS
times per day  sodium chloride flush 0.9 % injection 10 mL, 10 mL, Intravenous, PRN  promethazine (PHENERGAN) tablet 12.5 mg, 12.5 mg, Oral, Q6H PRN **OR** ondansetron (ZOFRAN) injection 4 mg, 4 mg, Intravenous, Q6H PRN  polyethylene glycol (GLYCOLAX) packet 17 g, 17 g, Oral, Daily PRN  acetaminophen (TYLENOL) tablet 650 mg, 650 mg, Oral, Q6H PRN **OR** acetaminophen (TYLENOL) suppository 650 mg, 650 mg, Rectal, Q6H PRN  morphine (PF) injection 2 mg, 2 mg, Intravenous, Q4H PRN **OR** morphine sulfate (PF) injection 4 mg, 4 mg, Intravenous, Q4H PRN  HYDROcodone-acetaminophen (NORCO) 5-325 MG per tablet 1 tablet, 1 tablet, Oral, Q6H PRN  insulin lispro (HUMALOG) injection vial 0-6 Units, 0-6 Units, Subcutaneous, TID WC  insulin lispro (HUMALOG) injection vial 0-3 Units, 0-3 Units, Subcutaneous, Nightly  glucose (GLUTOSE) 40 % oral gel 15 g, 15 g, Oral, PRN  dextrose 50 % IV solution, 12.5 g, Intravenous, PRN  glucagon (rDNA) injection 1 mg, 1 mg, Intramuscular, PRN  dextrose 5 % solution, 100 mL/hr, Intravenous, PRN  atorvastatin (LIPITOR) tablet 40 mg, 40 mg, Oral, Nightly  busPIRone (BUSPAR) tablet 30 mg, 30 mg, Oral, Daily  escitalopram (LEXAPRO) tablet 20 mg, 20 mg, Oral, Daily  theophylline (NICKY-24) extended release capsule 300 mg, 300 mg, Oral, BID  dilTIAZem (CARDIZEM CD) extended release capsule 240 mg, 240 mg, Oral, Nightly  tiotropium (SPIRIVA RESPIMAT) 2.5 MCG/ACT inhaler 2 puff, 2 puff, Inhalation, Daily **AND** budesonide-formoterol (SYMBICORT) 160-4.5 MCG/ACT inhaler 2 puff, 2 puff, Inhalation, BID  magnesium sulfate 1000 mg in dextrose 5% 100 mL IVPB, 1,000 mg, Intravenous, PRN    No Known Allergies    Social History:    Social History     Socioeconomic History    Marital status:      Spouse name: None    Number of children: None    Years of education: None    Highest education level: None   Occupational History    None   Social Needs    Financial resource strain: None    Food insecurity Worry: None     Inability: None    Transportation needs     Medical: None     Non-medical: None   Tobacco Use    Smoking status: Current Every Day Smoker     Packs/day: 0.50     Years: 40.00     Pack years: 20.00     Types: Cigarettes     Last attempt to quit: 2002     Years since quittin.7    Smokeless tobacco: Never Used   Substance and Sexual Activity    Alcohol use: Yes     Alcohol/week: 12.0 standard drinks     Types: 12 Cans of beer per week     Comment: occasionally     Drug use: No    Sexual activity: Yes     Partners: Female     Comment:    Lifestyle    Physical activity     Days per week: None     Minutes per session: None    Stress: None   Relationships    Social connections     Talks on phone: None     Gets together: None     Attends Confucianism service: None     Active member of club or organization: None     Attends meetings of clubs or organizations: None     Relationship status: None    Intimate partner violence     Fear of current or ex partner: None     Emotionally abused: None     Physically abused: None     Forced sexual activity: None   Other Topics Concern    None   Social History Narrative    None       Family History:   Family History   Problem Relation Age of Onset    High Blood Pressure Brother        Immunization:  Immunization History   Administered Date(s) Administered    Tdap (Boostrix, Adacel) 10/31/2018         REVIEW OF SYSTEMS:    CONSTITUTIONAL:  negative for fevers, chills, diaphoresis, activity change, appetite change, fatigue, night sweats and unexpected weight change.    EYES:  negative for blurred vision, eye discharge, visual disturbance and icterus  HEENT:  negative for hearing loss, tinnitus, ear drainage, sinus pressure, nasal congestion, epistaxis and snoring  RESPIRATORY:  See HPI  CARDIOVASCULAR:  negative for chest pain, palpitations, exertional chest pressure/discomfort, edema, syncope  GASTROINTESTINAL:  negative for nausea, vomiting, diarrhea, constipation, blood in stool and abdominal pain  GENITOURINARY:  negative for frequency, dysuria and hematuria  HEMATOLOGIC/LYMPHATIC:  negative for easy bruising, bleeding and lymphadenopathy  ALLERGIC/IMMUNOLOGIC:  negative for recurrent infections, angioedema, anaphylaxis and drug reactions  ENDOCRINE:  negative for weight changes and diabetic symptoms including polyuria, polydipsia and polyphagia    MUSCULOSKELETAL:  negative for  pain, joint swelling, decreased range of motion and muscle weakness  NEUROLOGICAL:  negative for headaches, slurred speech, unilateral weakness  PSYCHIATRIC/BEHAVIORAL: negative for hallucinations, behavioral problems, confusion and agitation. Objective:   PHYSICAL EXAM:      VITALS:    Vitals:    02/15/21 1920 02/15/21 1921 02/15/21 1953 02/16/21 0200   BP:   (!) 146/90 137/84   Pulse:   82 74   Resp: 23 23 28 17   Temp:   97.6 °F (36.4 °C) 98.6 °F (37 °C)   TempSrc:   Oral Oral   SpO2: 92% (!) 88% 93% 97%   Weight:    187 lb 2 oz (84.9 kg)   Height:             CONSTITUTIONAL:  awake, alert, cooperative, no apparent distress, and appears stated age  NECK:  Supple, symmetrical, trachea midline, no adenopathy, thyroid symmetric, not enlarged and no tenderness  CHEST: Chest expansion equal and symmetrical, no intercostal retraction. LUNGS:  no increased work of breathing, has expiratory wheezes both lungs, no crackles. CARDIOVASCULAR: S1 and S2, no edema and no JVD  ABDOMEN:  normal bowel sounds, non-distended and no masses palpated, and no tenderness to palpation. No hepatospleenomegaly  LYMPHADENOPATHY:  no axillary or supraclavicular adenopathy. No cervical adnenopathy  PSYCHIATRIC: Oriented to person place and time. No obvious depression or anxiety. MUSCULOSKELETAL: No obvious misalignment or effusion of the joints. No clubbing, cyanosis of the digits. RIGHT AND LEFT LOWER EXTREMITIES: No edema, no inflammation, no tenderness.   SKIN:  normal skin color, texture, turgor and no redness, warmth, or swelling. No palpable nodules    DATA:               EXAMINATION:   TWO XRAY VIEWS OF THE CHEST       2/15/2021 1:05 pm       COMPARISON:   CT chest with contrast 08/04/2017       HISTORY:   ORDERING SYSTEM PROVIDED HISTORY: hypoxia   TECHNOLOGIST PROVIDED HISTORY:   Reason for exam:->hypoxia   Reason for Exam: hypoxia       FINDINGS:   Normal heart size and pulmonary vasculature.  No focal consolidation, pleural   effusion, or pneumothorax.  Lucent upper lungs compatible with known severe   emphysematous changes.           Impression   No evidence of acute process.       Severe emphysema.             abg 7.47/40/55            Assessment:     1. ch resp failure  2. Severe copd  3. ch corpulmonale          Plan:     1. D/w pt  2. Advised regular fu. Not seen me for 5 years  3. He promises to do it  4. Cont o2 at 3 litres that is what he uses at home  5. Bd rx  6. pft as outpt  7. Thanks for consult.  He can be dc

## 2021-02-17 ENCOUNTER — CARE COORDINATION (OUTPATIENT)
Dept: CASE MANAGEMENT | Age: 76
End: 2021-02-17

## 2021-02-17 LAB
EKG ATRIAL RATE: 280 BPM
EKG DIAGNOSIS: NORMAL
EKG P-R INTERVAL: 184 MS
EKG Q-T INTERVAL: 420 MS
EKG QRS DURATION: 136 MS
EKG QTC CALCULATION (BAZETT): 511 MS
EKG R AXIS: 81 DEGREES
EKG T AXIS: 54 DEGREES
EKG VENTRICULAR RATE: 89 BPM

## 2021-02-18 LAB — PROCALCITONIN: 0.04

## 2021-02-23 NOTE — CONSULTS
[facility]               [address], [address]                                  CONSULTATION    PATIENT NAME: Mojgan Grullon                  :        1945  MED REC NO:   [id number]                          ROOM:       [id number]  ACCOUNT NO:   [de-identified]                           ADMIT DATE: 2021  PROVIDER:     Amie Contreras MD    CONSULT DATE:  02/15/2021    CHIEF COMPLAINT AND HISTORY OF PRESENT ILLNESS:  The patient is a very  pleasant 47-year-old  male who I had actually seen in an office  consultation on 2021 for a mass that he appreciated in the left  lower quadrant. He had no prior history of any abdominal surgery except  for an appendectomy and he had noticed that this one had kind of  progressed over time and was starting to cause some discomfort. After  that consultation, he was going to get a CT of the abdomen and pelvis,  but then he presented to the emergency room complaining of severe left  lower quadrant abdominal pain which concerned the patient and then he  came to the ER for further evaluation at [facility]. He denied any nausea, vomiting, diarrhea, or constipation. He  did have a regular bowel movement in the early a.m. He was shown to  have a slight leukocytosis of 12,500. Liver function tests were within  normal limits along with a pancreatic lipase. Lactic acid level was at  the upper range of normal with a value of 2. His COVID test was also  negative. He then had a CT of the abdomen and pelvis which revealed a  left lateral anterior abdominal wall hernia containing a fluid density  versus a possible bowel loop. There was no evidence of any obstruction  though.   He additionally also had a right lateral abdominal wall hernia  in the right upper quadrant just near the inferior right lobe of the liver with a partial piece of colon loop present. Again there were no  bowel wall changes in either loop. No evidence of any thickening. No  evidence of any mesenteric thickening or fluid collections to suggest a  bowel obstruction. After the patient was admitted, the majority of his  pain has since resolved. The CT scan also showed some cholelithiasis  with no evidence of acute cholecystitis, and he also had some  diverticulosis present, but no evidence of any diverticulitis. Seeing  him in consultation, majority of his pain had resolved and I do not feel  that he has a bowel obstruction. At this point, we are going to go  ahead and slowly advance his diet, make sure he has no issues, follow  the abdominal examination and then he can likely be discharged, and I  will be able to arrange the surgery as an outpatient. PAST MEDICAL HISTORY:  COPD; hyperlipidemia; diabetes mellitus;  paroxysmal atrial fibrillation, on anticoagulation; depression with some  anxiety; question of chronic alcoholism. PAST SURGICAL HISTORY:  He has had an open appendectomy. He has had  radiofrequency ablation in 10/2006 for his atrial fibrillation. He has  had a permanent pacemaker in 2006. Prior colonoscopy in 2015. ALLERGIES:  No known drug allergies. FAMILY HISTORY:  Noncontributory. SOCIAL HISTORY:  , everyday smoker about a half pack per day, 40  pack years. Reports alcohol use about a 12-pack per week per patient. Denies any IV drug abuse. MEDICATIONS:  Please refer to the electronic health record. REVIEW OF SYSTEMS:  A 10-point review of systems is otherwise negative  unless stated as above in the history of present illness. PHYSICAL EXAMINATION:  VITAL SIGNS:  He is afebrile. Vital signs are stable. GENERAL:  In no acute distress. Alert, awake and oriented x3. HEENT:  Normocephalic, atraumatic.   Pupils are equal, round, and reactive to light. Extraocular muscles are intact. Trachea is midline. No lymphadenopathy. Anicteric sclerae. Moist mucous membranes. NECK:  Supple. No lymphadenopathy. HEART:  Irregular. CHEST:  Clear to auscultation bilaterally. Air entry is bilaterally  equal.  No rales, rhonchi, wheezes, or crackles. ABDOMEN:  Soft, nondistended. No rebound or guarding. He does have  some slight fullness in the left lower quadrant consistent with a likely  spigelian hernia but has minimal tenderness with palpation. It is hard  to appreciate the right upper quadrant lateral abdominal wall hernia. There are no overlying skin changes in the abdominal wall. No  pulsations or fluid shifts. No organomegaly. No CVA tenderness. No  inguinal lymphadenopathy. Negative Connor sign. EXTREMITIES:  Negative for erythema, edema, cellulitis, or cyanosis. ASSESSMENT AND PLAN:  A 49-year-old  male who presents with an  acute episode of left lower quadrant pain which is now resolved. CT  imaging is consistent with a left lower quadrant and right upper  quadrant spigelian hernias. I do not feel these are chronic in nature. He does not have a small bowel or colonic obstruction, and I will  advance his diet as tolerated. I suspect he will be able to be  discharged from the facility. I will then go ahead as an outpatient and  go ahead and electively get his surgeries performed. I have already  discussed with him the laparoscopic versus open approach and we will  initially proceed with a laparoscopic approach with mesh repair. I have  already discussed all the risks, benefits and alternatives of the  procedure in detail with the patient and we will get this scheduled in  the near future. If he is to have any concerns, questions or issues  upon discharge, he will then call my office immediately.         Shonda Monsalve MD    D: 02/22/2021 11:03:46       T: 02/22/2021 11:08:58     [email or web address] Job#: [id number]     Doc#: [id number]    CC:  Jessica Mckeon MD

## 2021-03-02 NOTE — PROGRESS NOTES
Physician Progress Note      PATIENT:               Delfin QUIÑONEZ #:                  [id number]  :                       1945  ADMIT DATE:       2021 12:45 PM  Elham Hernadez DATE:        2021 12:45 PM  RESPONDING  PROVIDER #:        Odean Sandifer MD          QUERY TEXT:    Patient admitted with LLQ pain. Noted documentation of acute respiratory   failure with hypoxia in progress note by Dr Edi Horne dated 2/15 and Chronic   respiratory failure by Dr Jaziel Marcelo in [facility] consult dated  and Dr. Kylie Arce in   discharge summary dated . If possible, please document in progress notes and discharge summary if you   are evaluating and /or treating any of the following: The medical record reflects the following:  Risk Factors: COPD  Clinical Indicators: CXR: severe emphysema, 2/15 PN, \"Cleared from surgical   standpoint but too hypoxic to go home. \" \"Acute respiratory failure with   hypoxia, needing 4-5L while here. \" \"He reports taking 2-3L oxygen every   night. \"  H&P, \"uses 2L nocturnal oxygen only. \"  2/15 ABG pH 7.47, O2 sat   84.0, pO2 52, HCO3 26.0. Per nursing assessments, pt required 2-5L to maintain   sats greater than 90%. Treatment: oxygen, ABG, Pulm consult, prednisone, nebs    Thank you,  Serena Lyles RN  Options provided:  -- acute respiratory failure with hypoxia  confirmed and chronic respiratory   failure ruled out  -- chronic respiratory failure confirmed and acute respiratory failure with   hypoxia  ruled out  -- acute respiratory failure with hypoxia  and chronic respiratory failure   confirmed  -- Other - I will add my own diagnosis  -- Disagree - Not applicable / Not valid  -- Disagree - Clinically unable to determine / Unknown  -- Refer to Clinical Documentation Reviewer    PROVIDER RESPONSE TEXT:    After study,chronic respiratory failure confirmed and acute respiratory   failure with hypoxia ruled out.     Query created by: Jennifer Reza on 3/2/2021 1:53 PM Electronically signed by:  Heather Parker MD 3/2/2021 4:19 PM

## 2021-03-15 PROBLEM — Z87.19 S/P LAPAROSCOPIC HERNIA REPAIR: Status: ACTIVE | Noted: 2021-03-15

## 2021-03-15 PROBLEM — Z98.890 S/P LAPAROSCOPIC HERNIA REPAIR: Status: ACTIVE | Noted: 2021-03-15

## 2023-05-24 ENCOUNTER — HOSPITAL ENCOUNTER (OUTPATIENT)
Dept: CT IMAGING | Age: 78
Discharge: HOME | End: 2023-05-24
Payer: OTHER GOVERNMENT

## 2023-05-24 VITALS
SYSTOLIC BLOOD PRESSURE: 152 MMHG | BODY MASS INDEX: 28.14 KG/M2 | HEART RATE: 76 BPM | WEIGHT: 190 LBS | RESPIRATION RATE: 16 BRPM | TEMPERATURE: 97.5 F | OXYGEN SATURATION: 93 % | HEIGHT: 69 IN | DIASTOLIC BLOOD PRESSURE: 71 MMHG

## 2023-05-24 LAB
APTT: 35.4 SECONDS (ref 25.1–37.1)
HCT VFR BLD CALC: 40.5 % (ref 42–52)
HEMOGLOBIN: 12.7 GM/DL (ref 13.5–18)
INR BLD: 0.89 INDEX
MCH RBC QN AUTO: 31.1 PG (ref 27–31)
MCHC RBC AUTO-ENTMCNC: 31.4 % (ref 32–36)
MCV RBC AUTO: 99 FL (ref 78–100)
PDW BLD-RTO: 13.3 % (ref 11.7–14.9)
PLATELET # BLD: 199 K/CU MM (ref 140–440)
PMV BLD AUTO: 8.6 FL (ref 7.5–11.1)
PROTHROMBIN TIME: 11.3 SECONDS (ref 11.7–14.5)
RBC # BLD: 4.09 M/CU MM (ref 4.6–6.2)
WBC # BLD: 10 K/CU MM (ref 4–10.5)

## 2023-05-24 PROCEDURE — 85730 THROMBOPLASTIN TIME PARTIAL: CPT

## 2023-05-24 PROCEDURE — 85610 PROTHROMBIN TIME: CPT

## 2023-05-24 PROCEDURE — 85027 COMPLETE CBC AUTOMATED: CPT

## 2023-05-24 RX ORDER — SODIUM CHLORIDE 0.9 % (FLUSH) 0.9 %
10 SYRINGE (ML) INJECTION PRN
Status: DISCONTINUED | OUTPATIENT
Start: 2023-05-24 | End: 2023-05-25 | Stop reason: HOSPADM

## 2023-05-24 ASSESSMENT — PAIN - FUNCTIONAL ASSESSMENT: PAIN_FUNCTIONAL_ASSESSMENT: 0-10

## 2023-05-24 NOTE — PROGRESS NOTES
Dr Kiet Clark in to see pt prior to procedure and obtain consent, explained procedure and  risks. Pt decided to refuse procedure. Encouraged pt to follow up with Dr Sean Almanza, pt voiced understanding. D/C'd PIV  AMB pt to main entrance for discharge.

## 2023-10-11 ENCOUNTER — APPOINTMENT (OUTPATIENT)
Dept: CT IMAGING | Age: 78
End: 2023-10-11
Payer: OTHER GOVERNMENT

## 2023-10-11 ENCOUNTER — HOSPITAL ENCOUNTER (EMERGENCY)
Age: 78
Discharge: HOME | End: 2023-10-11
Attending: EMERGENCY MEDICINE
Payer: OTHER GOVERNMENT

## 2023-10-11 ENCOUNTER — APPOINTMENT (OUTPATIENT)
Dept: GENERAL RADIOLOGY | Age: 78
End: 2023-10-11
Payer: OTHER GOVERNMENT

## 2023-10-11 VITALS
DIASTOLIC BLOOD PRESSURE: 91 MMHG | SYSTOLIC BLOOD PRESSURE: 120 MMHG | RESPIRATION RATE: 22 BRPM | HEART RATE: 104 BPM | OXYGEN SATURATION: 94 % | TEMPERATURE: 97.8 F | HEIGHT: 69 IN | WEIGHT: 190 LBS | BODY MASS INDEX: 28.14 KG/M2

## 2023-10-11 DIAGNOSIS — R91.8 PULMONARY NODULES: ICD-10-CM

## 2023-10-11 DIAGNOSIS — G89.29 CHRONIC NONINTRACTABLE HEADACHE, UNSPECIFIED HEADACHE TYPE: Primary | ICD-10-CM

## 2023-10-11 DIAGNOSIS — R51.9 CHRONIC NONINTRACTABLE HEADACHE, UNSPECIFIED HEADACHE TYPE: Primary | ICD-10-CM

## 2023-10-11 DIAGNOSIS — J98.59 MEDIASTINAL MASS: ICD-10-CM

## 2023-10-11 LAB
ALBUMIN SERPL-MCNC: 3.9 GM/DL (ref 3.4–5)
ALP BLD-CCNC: 99 IU/L (ref 40–129)
ALT SERPL-CCNC: 8 U/L (ref 10–40)
ANION GAP SERPL CALCULATED.3IONS-SCNC: 7 MMOL/L (ref 4–16)
AST SERPL-CCNC: 16 IU/L (ref 15–37)
BASOPHILS ABSOLUTE: 0.1 K/CU MM
BASOPHILS RELATIVE PERCENT: 0.8 % (ref 0–1)
BILIRUB SERPL-MCNC: 0.4 MG/DL (ref 0–1)
BUN SERPL-MCNC: 11 MG/DL (ref 6–23)
CALCIUM SERPL-MCNC: 9.8 MG/DL (ref 8.3–10.6)
CHLORIDE BLD-SCNC: 101 MMOL/L (ref 99–110)
CO2: 27 MMOL/L (ref 21–32)
CREAT SERPL-MCNC: 1.1 MG/DL (ref 0.9–1.3)
DIFFERENTIAL TYPE: ABNORMAL
EKG ATRIAL RATE: 77 BPM
EKG DIAGNOSIS: NORMAL
EKG P-R INTERVAL: 228 MS
EKG Q-T INTERVAL: 428 MS
EKG QRS DURATION: 114 MS
EKG QTC CALCULATION (BAZETT): 484 MS
EKG R AXIS: 68 DEGREES
EKG T AXIS: 51 DEGREES
EKG VENTRICULAR RATE: 77 BPM
EOSINOPHILS ABSOLUTE: 0.2 K/CU MM
EOSINOPHILS RELATIVE PERCENT: 1.6 % (ref 0–3)
GFR SERPL CREATININE-BSD FRML MDRD: >60 ML/MIN/1.73M2
GLUCOSE BLD-MCNC: 107 MG/DL (ref 70–99)
GLUCOSE SERPL-MCNC: 119 MG/DL (ref 70–99)
HCT VFR BLD CALC: 37.4 % (ref 42–52)
HEMOGLOBIN: 12.1 GM/DL (ref 13.5–18)
IMMATURE NEUTROPHIL %: 0.5 % (ref 0–0.43)
LYMPHOCYTES ABSOLUTE: 3 K/CU MM
LYMPHOCYTES RELATIVE PERCENT: 28.8 % (ref 24–44)
MAGNESIUM: 1.8 MG/DL (ref 1.8–2.4)
MCH RBC QN AUTO: 31.4 PG (ref 27–31)
MCHC RBC AUTO-ENTMCNC: 32.4 % (ref 32–36)
MCV RBC AUTO: 97.1 FL (ref 78–100)
MONOCYTES ABSOLUTE: 0.7 K/CU MM
MONOCYTES RELATIVE PERCENT: 6.9 % (ref 0–4)
NUCLEATED RBC %: 0 %
PDW BLD-RTO: 14.3 % (ref 11.7–14.9)
PLATELET # BLD: 231 K/CU MM (ref 140–440)
PMV BLD AUTO: 8.5 FL (ref 7.5–11.1)
POTASSIUM SERPL-SCNC: 4.9 MMOL/L (ref 3.5–5.1)
RBC # BLD: 3.85 M/CU MM (ref 4.6–6.2)
SEGMENTED NEUTROPHILS ABSOLUTE COUNT: 6.5 K/CU MM
SEGMENTED NEUTROPHILS RELATIVE PERCENT: 61.4 % (ref 36–66)
SODIUM BLD-SCNC: 135 MMOL/L (ref 135–145)
TOTAL IMMATURE NEUTOROPHIL: 0.05 K/CU MM
TOTAL NUCLEATED RBC: 0 K/CU MM
TOTAL PROTEIN: 6.7 GM/DL (ref 6.4–8.2)
WBC # BLD: 10.6 K/CU MM (ref 4–10.5)

## 2023-10-11 PROCEDURE — 71046 X-RAY EXAM CHEST 2 VIEWS: CPT

## 2023-10-11 PROCEDURE — 70450 CT HEAD/BRAIN W/O DYE: CPT

## 2023-10-11 PROCEDURE — 6360000002 HC RX W HCPCS

## 2023-10-11 PROCEDURE — 2580000003 HC RX 258

## 2023-10-11 PROCEDURE — 83735 ASSAY OF MAGNESIUM: CPT

## 2023-10-11 PROCEDURE — 85025 COMPLETE CBC W/AUTO DIFF WBC: CPT

## 2023-10-11 PROCEDURE — 82962 GLUCOSE BLOOD TEST: CPT

## 2023-10-11 PROCEDURE — 99285 EMERGENCY DEPT VISIT HI MDM: CPT

## 2023-10-11 PROCEDURE — 93005 ELECTROCARDIOGRAM TRACING: CPT

## 2023-10-11 PROCEDURE — 96374 THER/PROPH/DIAG INJ IV PUSH: CPT

## 2023-10-11 PROCEDURE — 6370000000 HC RX 637 (ALT 250 FOR IP)

## 2023-10-11 PROCEDURE — 80053 COMPREHEN METABOLIC PANEL: CPT

## 2023-10-11 PROCEDURE — 71250 CT THORAX DX C-: CPT

## 2023-10-11 RX ORDER — 0.9 % SODIUM CHLORIDE 0.9 %
1000 INTRAVENOUS SOLUTION INTRAVENOUS ONCE
Status: COMPLETED | OUTPATIENT
Start: 2023-10-11 | End: 2023-10-11

## 2023-10-11 RX ORDER — BUTALBITAL, ACETAMINOPHEN AND CAFFEINE 50; 325; 40 MG/1; MG/1; MG/1
1 TABLET ORAL EVERY 4 HOURS PRN
Qty: 180 TABLET | Refills: 3 | Status: SHIPPED | OUTPATIENT
Start: 2023-10-11

## 2023-10-11 RX ORDER — MORPHINE SULFATE 4 MG/ML
4 INJECTION, SOLUTION INTRAMUSCULAR; INTRAVENOUS ONCE
Status: DISCONTINUED | OUTPATIENT
Start: 2023-10-11 | End: 2023-10-11 | Stop reason: HOSPADM

## 2023-10-11 RX ORDER — KETOROLAC TROMETHAMINE 30 MG/ML
15 INJECTION, SOLUTION INTRAMUSCULAR; INTRAVENOUS ONCE
Status: COMPLETED | OUTPATIENT
Start: 2023-10-11 | End: 2023-10-11

## 2023-10-11 RX ORDER — BUTALBITAL, ACETAMINOPHEN AND CAFFEINE 50; 325; 40 MG/1; MG/1; MG/1
1 TABLET ORAL ONCE
Status: COMPLETED | OUTPATIENT
Start: 2023-10-11 | End: 2023-10-11

## 2023-10-11 RX ORDER — ONDANSETRON 2 MG/ML
4 INJECTION INTRAMUSCULAR; INTRAVENOUS ONCE
Status: DISCONTINUED | OUTPATIENT
Start: 2023-10-11 | End: 2023-10-11 | Stop reason: HOSPADM

## 2023-10-11 RX ADMIN — BUTALBITAL, ACETAMINOPHEN, AND CAFFEINE 1 TABLET: 325; 50; 40 TABLET ORAL at 10:37

## 2023-10-11 RX ADMIN — SODIUM CHLORIDE 1000 ML: 9 INJECTION, SOLUTION INTRAVENOUS at 10:37

## 2023-10-11 RX ADMIN — KETOROLAC TROMETHAMINE 15 MG: 30 INJECTION, SOLUTION INTRAMUSCULAR; INTRAVENOUS at 10:38

## 2023-10-11 ASSESSMENT — PAIN SCALES - GENERAL
PAINLEVEL_OUTOF10: 10
PAINLEVEL_OUTOF10: 10
PAINLEVEL_OUTOF10: 4

## 2023-10-11 ASSESSMENT — PAIN DESCRIPTION - LOCATION: LOCATION: HEAD

## 2023-10-11 NOTE — ED PROVIDER NOTES
I independently examined and evaluated Becka Castaneda. In brief, 78-year-old male with complaint of a headache, left-sided for a full month. He was seen at [facility] emergency department when it initially started. He reports that on 2 September he felt weak and felt like he could not walk and the left side his face felt numb as well as left side of his tongue. He has had continued issues since that time. He has chronic shortness of breath due to his COPD, denies chest pain. He is on blood thinners for atrial fibrillation. He was seen at the [facility] and sent here for evaluation. No falls or trauma. Focused exam revealed patient no acute distress, regular rate and rhythm, nonlabored respirations. Abdomen soft and nondistended. He is alert and oriented. He is able to stand and walk. Strength equal in all extremities. No aphasia noted on my exam, did feel like he has difference in sensation over the left side of face compared to the right. CC/HPI Summary, DDx, ED Course, and Reassessment: Patient with complaint of a headache for a month, reviewed care everywhere documentation from visit at [facility] emergency department on 9/2/2023. Appears that he had CT scan of the head as well as CT angio of the head and neck that were negative however he did have pulmonary nodules concerning for cancer. When we asked him about this he tells us that he knows he has cancer, he was willing to undergo work-up here. He became very upset and did not wish to stay, we wanted to admit him to the hospital as it looks like he likely has lung cancer that is rapidly progressing, but he says that he knows that he has this and does not want treatment, he only wanted to come in and have the headache evaluated, and does not wish to stay.   It does appear that he had been referred from that visit that he would likely need outpatient MRI given the symptoms in the setting of a likely new cancer, but does not appear that
DISPOSITION/PLAN     DISPOSITION Decision To Discharge 10/11/2023 11:12:26 AM      PATIENT REFERRED TO:  Arthur Sutton MD  [address] [facility]  [phone]      make a follow up appt as soon as possible would recommend MRI to further evaulate headache      DISCHARGE MEDICATIONS:  New Prescriptions    BUTALBITAL-ACETAMINOPHEN-CAFFEINE (FIORICET, ESGIC) -40 MG PER TABLET    Take 1 tablet by mouth every 4 hours as needed for Headaches       DISCONTINUED MEDICATIONS:  Discontinued Medications    No medications on file              (Please note that portions of this note were completed with a voice recognition program.  Efforts were made to edit the dictations but occasionally words are mis-transcribed.)    BRIAN Boland CNP (electronically signed)       BRIAN Boland CNP  10/11/23 [id number] [id number] BRIAN Foss CNP  10/11/23 [id number]

## 2023-10-30 ENCOUNTER — HOSPITAL ENCOUNTER (INPATIENT)
Age: 78
LOS: 1 days | Discharge: HOME | DRG: 055 | End: 2023-11-01
Attending: EMERGENCY MEDICINE | Admitting: INTERNAL MEDICINE
Payer: OTHER GOVERNMENT

## 2023-10-30 DIAGNOSIS — C79.31 LUNG CANCER METASTATIC TO BRAIN (HCC): ICD-10-CM

## 2023-10-30 DIAGNOSIS — C34.90 LUNG CANCER METASTATIC TO BRAIN (HCC): ICD-10-CM

## 2023-10-30 DIAGNOSIS — R51.9 INTRACTABLE HEADACHE, UNSPECIFIED CHRONICITY PATTERN, UNSPECIFIED HEADACHE TYPE: Primary | ICD-10-CM

## 2023-10-30 LAB
ALBUMIN SERPL-MCNC: 3.8 GM/DL (ref 3.4–5)
ALP BLD-CCNC: 125 IU/L (ref 40–129)
ALT SERPL-CCNC: 7 U/L (ref 10–40)
ANION GAP SERPL CALCULATED.3IONS-SCNC: 11 MMOL/L (ref 4–16)
AST SERPL-CCNC: 17 IU/L (ref 15–37)
BASOPHILS ABSOLUTE: 0.1 K/CU MM
BASOPHILS RELATIVE PERCENT: 0.8 % (ref 0–1)
BILIRUB SERPL-MCNC: 0.2 MG/DL (ref 0–1)
BUN SERPL-MCNC: 11 MG/DL (ref 6–23)
CALCIUM SERPL-MCNC: 9.3 MG/DL (ref 8.3–10.6)
CHLORIDE BLD-SCNC: 98 MMOL/L (ref 99–110)
CO2: 26 MMOL/L (ref 21–32)
CREAT SERPL-MCNC: 1 MG/DL (ref 0.9–1.3)
DIFFERENTIAL TYPE: ABNORMAL
EOSINOPHILS ABSOLUTE: 0.2 K/CU MM
EOSINOPHILS RELATIVE PERCENT: 2.2 % (ref 0–3)
GFR SERPL CREATININE-BSD FRML MDRD: >60 ML/MIN/1.73M2
GLUCOSE SERPL-MCNC: 101 MG/DL (ref 70–99)
HCT VFR BLD CALC: 38.4 % (ref 42–52)
HEMOGLOBIN: 11.8 GM/DL (ref 13.5–18)
IMMATURE NEUTROPHIL %: 0.3 % (ref 0–0.43)
LYMPHOCYTES ABSOLUTE: 2.8 K/CU MM
LYMPHOCYTES RELATIVE PERCENT: 28.4 % (ref 24–44)
MCH RBC QN AUTO: 30.9 PG (ref 27–31)
MCHC RBC AUTO-ENTMCNC: 30.7 % (ref 32–36)
MCV RBC AUTO: 100.5 FL (ref 78–100)
MONOCYTES ABSOLUTE: 0.9 K/CU MM
MONOCYTES RELATIVE PERCENT: 9 % (ref 0–4)
NUCLEATED RBC %: 0 %
PDW BLD-RTO: 14.4 % (ref 11.7–14.9)
PLATELET # BLD: 257 K/CU MM (ref 140–440)
PMV BLD AUTO: 9.2 FL (ref 7.5–11.1)
POTASSIUM SERPL-SCNC: 4 MMOL/L (ref 3.5–5.1)
RBC # BLD: 3.82 M/CU MM (ref 4.6–6.2)
SEGMENTED NEUTROPHILS ABSOLUTE COUNT: 5.9 K/CU MM
SEGMENTED NEUTROPHILS RELATIVE PERCENT: 59.3 % (ref 36–66)
SODIUM BLD-SCNC: 135 MMOL/L (ref 135–145)
TOTAL IMMATURE NEUTOROPHIL: 0.03 K/CU MM
TOTAL NUCLEATED RBC: 0 K/CU MM
TOTAL PROTEIN: 6.7 GM/DL (ref 6.4–8.2)
WBC # BLD: 9.9 K/CU MM (ref 4–10.5)

## 2023-10-30 PROCEDURE — 6370000000 HC RX 637 (ALT 250 FOR IP): Performed by: NURSE PRACTITIONER

## 2023-10-30 PROCEDURE — 96375 TX/PRO/DX INJ NEW DRUG ADDON: CPT

## 2023-10-30 PROCEDURE — G0378 HOSPITAL OBSERVATION PER HR: HCPCS

## 2023-10-30 PROCEDURE — 85025 COMPLETE CBC W/AUTO DIFF WBC: CPT

## 2023-10-30 PROCEDURE — 99285 EMERGENCY DEPT VISIT HI MDM: CPT

## 2023-10-30 PROCEDURE — 94640 AIRWAY INHALATION TREATMENT: CPT

## 2023-10-30 PROCEDURE — 2580000003 HC RX 258: Performed by: NURSE PRACTITIONER

## 2023-10-30 PROCEDURE — 80053 COMPREHEN METABOLIC PANEL: CPT

## 2023-10-30 PROCEDURE — 96374 THER/PROPH/DIAG INJ IV PUSH: CPT

## 2023-10-30 PROCEDURE — 6360000002 HC RX W HCPCS: Performed by: EMERGENCY MEDICINE

## 2023-10-30 RX ORDER — ALBUTEROL SULFATE 2.5 MG/3ML
2.5 SOLUTION RESPIRATORY (INHALATION) EVERY 6 HOURS PRN
Status: DISCONTINUED | OUTPATIENT
Start: 2023-10-30 | End: 2023-11-01 | Stop reason: HOSPADM

## 2023-10-30 RX ORDER — SODIUM CHLORIDE 0.9 % (FLUSH) 0.9 %
5-40 SYRINGE (ML) INJECTION EVERY 12 HOURS SCHEDULED
Status: DISCONTINUED | OUTPATIENT
Start: 2023-10-30 | End: 2023-11-01 | Stop reason: HOSPADM

## 2023-10-30 RX ORDER — SODIUM CHLORIDE 0.9 % (FLUSH) 0.9 %
10 SYRINGE (ML) INJECTION PRN
Status: DISCONTINUED | OUTPATIENT
Start: 2023-10-30 | End: 2023-11-01 | Stop reason: HOSPADM

## 2023-10-30 RX ORDER — BUDESONIDE AND FORMOTEROL FUMARATE DIHYDRATE 160; 4.5 UG/1; UG/1
2 AEROSOL RESPIRATORY (INHALATION)
Status: DISCONTINUED | OUTPATIENT
Start: 2023-10-31 | End: 2023-11-01 | Stop reason: HOSPADM

## 2023-10-30 RX ORDER — SODIUM CHLORIDE 9 MG/ML
INJECTION, SOLUTION INTRAVENOUS PRN
Status: DISCONTINUED | OUTPATIENT
Start: 2023-10-30 | End: 2023-11-01 | Stop reason: HOSPADM

## 2023-10-30 RX ORDER — ONDANSETRON 2 MG/ML
4 INJECTION INTRAMUSCULAR; INTRAVENOUS EVERY 6 HOURS PRN
Status: DISCONTINUED | OUTPATIENT
Start: 2023-10-30 | End: 2023-11-01 | Stop reason: HOSPADM

## 2023-10-30 RX ORDER — ENOXAPARIN SODIUM 100 MG/ML
40 INJECTION SUBCUTANEOUS DAILY
Status: DISCONTINUED | OUTPATIENT
Start: 2023-10-30 | End: 2023-10-30 | Stop reason: ALTCHOICE

## 2023-10-30 RX ORDER — ACETAMINOPHEN 325 MG/1
650 TABLET ORAL EVERY 6 HOURS PRN
Status: DISCONTINUED | OUTPATIENT
Start: 2023-10-30 | End: 2023-11-01 | Stop reason: HOSPADM

## 2023-10-30 RX ORDER — METOCLOPRAMIDE HYDROCHLORIDE 5 MG/ML
10 INJECTION INTRAMUSCULAR; INTRAVENOUS ONCE
Status: COMPLETED | OUTPATIENT
Start: 2023-10-30 | End: 2023-10-30

## 2023-10-30 RX ORDER — ACETAMINOPHEN 650 MG/1
650 SUPPOSITORY RECTAL EVERY 6 HOURS PRN
Status: DISCONTINUED | OUTPATIENT
Start: 2023-10-30 | End: 2023-11-01 | Stop reason: HOSPADM

## 2023-10-30 RX ORDER — POLYETHYLENE GLYCOL 3350 17 G
2 POWDER IN PACKET (EA) ORAL
Status: DISCONTINUED | OUTPATIENT
Start: 2023-10-30 | End: 2023-11-01 | Stop reason: HOSPADM

## 2023-10-30 RX ORDER — BUSPIRONE HYDROCHLORIDE 15 MG/1
30 TABLET ORAL DAILY
Status: DISCONTINUED | OUTPATIENT
Start: 2023-10-30 | End: 2023-11-01 | Stop reason: HOSPADM

## 2023-10-30 RX ORDER — DIPHENHYDRAMINE HYDROCHLORIDE 50 MG/ML
50 INJECTION INTRAMUSCULAR; INTRAVENOUS ONCE
Status: COMPLETED | OUTPATIENT
Start: 2023-10-30 | End: 2023-10-30

## 2023-10-30 RX ORDER — POLYETHYLENE GLYCOL 3350 17 G/17G
17 POWDER, FOR SOLUTION ORAL DAILY PRN
Status: DISCONTINUED | OUTPATIENT
Start: 2023-10-30 | End: 2023-11-01 | Stop reason: HOSPADM

## 2023-10-30 RX ORDER — ONDANSETRON 4 MG/1
4 TABLET, ORALLY DISINTEGRATING ORAL EVERY 8 HOURS PRN
Status: DISCONTINUED | OUTPATIENT
Start: 2023-10-30 | End: 2023-11-01 | Stop reason: HOSPADM

## 2023-10-30 RX ORDER — PROCHLORPERAZINE EDISYLATE 5 MG/ML
10 INJECTION INTRAMUSCULAR; INTRAVENOUS ONCE
Status: DISCONTINUED | OUTPATIENT
Start: 2023-10-30 | End: 2023-11-01 | Stop reason: HOSPADM

## 2023-10-30 RX ORDER — DILTIAZEM HYDROCHLORIDE 240 MG/1
240 CAPSULE, COATED, EXTENDED RELEASE ORAL DAILY
Status: DISCONTINUED | OUTPATIENT
Start: 2023-10-30 | End: 2023-11-01 | Stop reason: HOSPADM

## 2023-10-30 RX ORDER — ATORVASTATIN CALCIUM 40 MG/1
40 TABLET, FILM COATED ORAL DAILY
Status: DISCONTINUED | OUTPATIENT
Start: 2023-10-31 | End: 2023-11-01 | Stop reason: HOSPADM

## 2023-10-30 RX ORDER — KETOROLAC TROMETHAMINE 15 MG/ML
15 INJECTION, SOLUTION INTRAMUSCULAR; INTRAVENOUS ONCE
Status: COMPLETED | OUTPATIENT
Start: 2023-10-30 | End: 2023-10-30

## 2023-10-30 RX ORDER — ESCITALOPRAM OXALATE 10 MG/1
20 TABLET ORAL DAILY
Status: DISCONTINUED | OUTPATIENT
Start: 2023-10-31 | End: 2023-11-01 | Stop reason: HOSPADM

## 2023-10-30 RX ADMIN — METOCLOPRAMIDE HYDROCHLORIDE 10 MG: 5 INJECTION INTRAMUSCULAR; INTRAVENOUS at 13:55

## 2023-10-30 RX ADMIN — BUSPIRONE HYDROCHLORIDE 30 MG: 15 TABLET ORAL at 20:17

## 2023-10-30 RX ADMIN — SODIUM CHLORIDE, PRESERVATIVE FREE 10 ML: 5 INJECTION INTRAVENOUS at 20:17

## 2023-10-30 RX ADMIN — DILTIAZEM HYDROCHLORIDE 240 MG: 240 CAPSULE, COATED, EXTENDED RELEASE ORAL at 20:17

## 2023-10-30 RX ADMIN — DIPHENHYDRAMINE HYDROCHLORIDE 50 MG: 50 INJECTION, SOLUTION INTRAMUSCULAR; INTRAVENOUS at 13:54

## 2023-10-30 RX ADMIN — RIVAROXABAN 20 MG: 20 TABLET, FILM COATED ORAL at 20:17

## 2023-10-30 RX ADMIN — BUDESONIDE AND FORMOTEROL FUMARATE DIHYDRATE 2 PUFF: 160; 4.5 AEROSOL RESPIRATORY (INHALATION) at 21:15

## 2023-10-30 RX ADMIN — KETOROLAC TROMETHAMINE 15 MG: 15 INJECTION, SOLUTION INTRAMUSCULAR; INTRAVENOUS at 13:55

## 2023-10-30 RX ADMIN — ACETAMINOPHEN 650 MG: 325 TABLET ORAL at 23:01

## 2023-10-30 ASSESSMENT — PAIN SCALES - GENERAL
PAINLEVEL_OUTOF10: 8
PAINLEVEL_OUTOF10: 7
PAINLEVEL_OUTOF10: 9
PAINLEVEL_OUTOF10: 9
PAINLEVEL_OUTOF10: 3
PAINLEVEL_OUTOF10: 5

## 2023-10-30 ASSESSMENT — PAIN DESCRIPTION - LOCATION
LOCATION: HEAD

## 2023-10-30 ASSESSMENT — PAIN - FUNCTIONAL ASSESSMENT: PAIN_FUNCTIONAL_ASSESSMENT: 0-10

## 2023-10-30 ASSESSMENT — PAIN DESCRIPTION - ORIENTATION: ORIENTATION: UPPER;LEFT

## 2023-10-30 ASSESSMENT — PAIN DESCRIPTION - DESCRIPTORS: DESCRIPTORS: ACHING

## 2023-10-30 NOTE — ED NOTES
ED TO INPATIENT SBAR HANDOFF    Patient Name: Ilia Kaminski   :    78 y.o. Preferred Name  Litzy PORRAS  Family/Caregiver Present no   Restraints no   C-SSRS: Risk of Suicide: No Risk  Sitter no   Sepsis Risk Score Sepsis Risk Score: 1.5      Situation  Chief Complaint   Patient presents with    Headache     X1 month, has been going to the [place] for outpatient testing, but reports the headache became worse today. Brief Description of Patient's Condition: Pt to the ED with complaints of a headache x1 month. Patient states that he is supposed [facility] get [facility], but [facility] \"[facility]". Patient reports that he was wanting to get an MRI while he is here. Patient has been seen in the past month for same, CT's are clear. A&Ox3, ADLs per self. Mental Status: oriented  Arrived from: home    Imaging:   No orders to display     Abnormal labs:   Abnormal Labs Reviewed   CBC WITH AUTO DIFFERENTIAL - Abnormal; Notable for the following components:       Result Value    RBC 3.82 (*)     Hemoglobin 11.8 (*)     Hematocrit 38.4 (*)     .5 (*)     MCHC 30.7 (*)     Monocytes % 9.0 (*)     All other components within normal limits   COMPREHENSIVE METABOLIC PANEL - Abnormal; Notable for the following components:    Chloride 98 (*)     Glucose 101 (*)     ALT 7 (*)     All other components within normal limits       Background  History:   Past Medical History:   Diagnosis Date    Alcohol abuse     Atrial flutter ([address])     10/2006-s/p atrial ablation    COPD (chronic obstructive pulmonary disease) ([address])     Diabetes mellitus ([address])     H/O 24 hour EKG monitoring , . 24 hr Holter monitor. Normal    H/O cardiovascular stress test 11/10, 12/07    11/10. EF 64%. Normal myocardial study    H/O echocardiogram 2013,  EF55% LA mild dilated, mild MR, mild-mod TR, mild pulm htn . EF >55%. The left atrium is mildly dilated.     History of cardiac monitoring 14    14

## 2023-10-30 NOTE — ACP (ADVANCE CARE PLANNING)
Patient does not have any ACP documents/Medical Power of . LSW notes hospital will follow [place] Next of Kin hierarchy in the following descending order for priority:    Guardian  Spouse  Majority of adult Children  Parents  Majority of adult Siblings  Nearest Relative not described above    Per [place] Next of Kin hierarchy: Patients' adult child will be [address]

## 2023-10-30 NOTE — H&P
Colonoscopy (3/3/15); and hernia repair. Allergies: No Known Allergies  Fam HX: family history includes High Blood Pressure in his brother. Soc HX:   Social History     Socioeconomic History    Marital status:    Tobacco Use    Smoking status: Every Day     Packs/day: 0.50     Years: 40.00     Additional pack years: 0.00     Total pack years: 20.00     Types: Cigarettes     Last attempt to quit: 2002     Years since quittin.4    Smokeless tobacco: Never   Substance and Sexual Activity    Alcohol use: Yes     Alcohol/week: 12.0 standard drinks of alcohol     Types: 12 Cans of beer per week     Comment: occasionally     Drug use: No    Sexual activity: Yes     Partners: Female     Comment:        Medications:   Medications:    Infusions:   PRN Meds:     Labs      CBC:   Recent Labs     10/30/23  1237   WBC 9.9   HGB 11.8*        BMP:    Recent Labs     10/30/23  1237      K 4.0   CL 98*   CO2 26   BUN 11   CREATININE 1.0   GLUCOSE 101*     Hepatic:   Recent Labs     10/30/23  1237   AST 17   ALT 7*   BILITOT 0.2   ALKPHOS 125     Lipids:   Lab Results   Component Value Date/Time    CHOL 159 2018 12:00 AM     2018 12:00 AM    TRIG 43 2018 12:00 AM     Hemoglobin A1C:   Lab Results   Component Value Date/Time    LABA1C 5.3 2018 12:00 AM     TSH: No results found for: \"TSH\"  Troponin:   Lab Results   Component Value Date/Time    TROPONINT <0.010 2017 03:24 PM    TROPONINT <0.010 2014 05:21 AM    TROPONINT <0.010 2014 05:55 PM     Lactic Acid: No results for input(s): \"LACTA\" in the last 72 hours. BNP: No results for input(s): \"PROBNP\" in the last 72 hours.   UA:  Lab Results   Component Value Date/Time    NITRU NEGATIVE 2021 01:54 PM    COLORU STRAW 2021 01:54 PM    WBCUA <1 2021 01:54 PM    RBCUA <1 2021 01:54 PM    MUCUS RARE 2021 01:54 PM    TRICHOMONAS NONE SEEN 2021 01:54 PM    BACTERIA NEGATIVE

## 2023-10-30 NOTE — ED PROVIDER NOTES
EMERGENCY DEPARTMENT ENCOUNTER      CHIEF COMPLAINT:   Headache    HPI: Skylar Gonzalez is a 78 y.o. male who presents to the Emergency Department complaining of a headache. The patient states he has had a headache for over 1 month. He was seen in this emergency department for the headache on 10/11/2023 at which time he had a CT of the head without contrast that was negative. He also has a history of lung cancer for which he is not undergoing any treatment as he does not wish to have chemotherapy and be sick from the treatment. They offered admission to him at that visit for further evaluation, but he states that he decided to go home and follow-up with the [place]. However, he states the [place] has never ordered the MRI of the brain and so now he is back hoping to have the MRI. He describes the pain as an achy pressure. It is located throughout the head. . It is is constant. There are no exacerbating or relieving factors. The patient denies photophobia and phonophobia. It came on gradually and is not the worst headache of the patient's life. There was no thunderclap presentation. The patient states that he does not usually get headaches. The patient denies blurred vision, slurred speech, neck pain, neck stiffness, vomiting, numbness, tingling, or weakness. REVIEW OF SYSTEMS:  CONSTITUTIONAL:  Denies fever, chills, weight loss or weakness  EYES:  Denies photophobia or discharge  ENT:  Denies sore throat or ear pain  CARDIOVASCULAR:  Denies chest pain, palpitations or swelling  RESPIRATORY:  Denies cough or shortness of breath  GI:  Denies abdominal pain, nausea, vomiting, or diarrhea  MUSCULOSKELETAL:  Denies back pain  SKIN:  No rash  NEUROLOGIC:  Complains of headache, denies focal weakness or sensory changes  All systems negative except as marked. \"Remaining review of systems reviewed and negative. I have reviewed the nursing triage documentation and agree unless otherwise noted below. \"      PAST MEDICAL

## 2023-10-31 ENCOUNTER — APPOINTMENT (OUTPATIENT)
Dept: MRI IMAGING | Age: 78
DRG: 055 | End: 2023-10-31
Payer: OTHER GOVERNMENT

## 2023-10-31 LAB
ESTIMATED AVERAGE GLUCOSE: 100 MG/DL
GLUCOSE BLD-MCNC: 178 MG/DL (ref 70–99)
HBA1C MFR BLD: 5.1 % (ref 4.2–6.3)
TSH SERPL DL<=0.005 MIU/L-ACNC: 3.73 UIU/ML (ref 0.27–4.2)

## 2023-10-31 PROCEDURE — G0378 HOSPITAL OBSERVATION PER HR: HCPCS

## 2023-10-31 PROCEDURE — 6370000000 HC RX 637 (ALT 250 FOR IP): Performed by: NURSE PRACTITIONER

## 2023-10-31 PROCEDURE — 99223 1ST HOSP IP/OBS HIGH 75: CPT | Performed by: PSYCHIATRY & NEUROLOGY

## 2023-10-31 PROCEDURE — 6360000002 HC RX W HCPCS: Performed by: NURSE PRACTITIONER

## 2023-10-31 PROCEDURE — A9579 GAD-BASE MR CONTRAST NOS,1ML: HCPCS | Performed by: NURSE PRACTITIONER

## 2023-10-31 PROCEDURE — 84443 ASSAY THYROID STIM HORMONE: CPT

## 2023-10-31 PROCEDURE — 94761 N-INVAS EAR/PLS OXIMETRY MLT: CPT

## 2023-10-31 PROCEDURE — 82962 GLUCOSE BLOOD TEST: CPT

## 2023-10-31 PROCEDURE — 94640 AIRWAY INHALATION TREATMENT: CPT

## 2023-10-31 PROCEDURE — 2700000000 HC OXYGEN THERAPY PER DAY

## 2023-10-31 PROCEDURE — 70553 MRI BRAIN STEM W/O & W/DYE: CPT

## 2023-10-31 PROCEDURE — 6360000004 HC RX CONTRAST MEDICATION: Performed by: NURSE PRACTITIONER

## 2023-10-31 PROCEDURE — 6370000000 HC RX 637 (ALT 250 FOR IP): Performed by: CLINICAL NURSE SPECIALIST

## 2023-10-31 PROCEDURE — 83036 HEMOGLOBIN GLYCOSYLATED A1C: CPT

## 2023-10-31 PROCEDURE — 6360000002 HC RX W HCPCS: Performed by: CLINICAL NURSE SPECIALIST

## 2023-10-31 PROCEDURE — 36415 COLL VENOUS BLD VENIPUNCTURE: CPT

## 2023-10-31 PROCEDURE — 2580000003 HC RX 258: Performed by: NURSE PRACTITIONER

## 2023-10-31 RX ORDER — OXYCODONE HYDROCHLORIDE AND ACETAMINOPHEN 5; 325 MG/1; MG/1
1 TABLET ORAL EVERY 4 HOURS PRN
Qty: 6 TABLET | Refills: 0 | OUTPATIENT
Start: 2023-10-31 | End: 2023-11-03

## 2023-10-31 RX ORDER — DEXAMETHASONE SODIUM PHOSPHATE 4 MG/ML
4 INJECTION, SOLUTION INTRA-ARTICULAR; INTRALESIONAL; INTRAMUSCULAR; INTRAVENOUS; SOFT TISSUE EVERY 6 HOURS
Status: COMPLETED | OUTPATIENT
Start: 2023-10-31 | End: 2023-11-01

## 2023-10-31 RX ORDER — MORPHINE SULFATE 2 MG/ML
2 INJECTION, SOLUTION INTRAMUSCULAR; INTRAVENOUS EVERY 4 HOURS PRN
Status: DISCONTINUED | OUTPATIENT
Start: 2023-10-31 | End: 2023-11-01 | Stop reason: HOSPADM

## 2023-10-31 RX ORDER — OXYCODONE HYDROCHLORIDE AND ACETAMINOPHEN 5; 325 MG/1; MG/1
1 TABLET ORAL EVERY 4 HOURS PRN
Status: DISCONTINUED | OUTPATIENT
Start: 2023-10-31 | End: 2023-11-01 | Stop reason: HOSPADM

## 2023-10-31 RX ORDER — KETOROLAC TROMETHAMINE 15 MG/ML
15 INJECTION, SOLUTION INTRAMUSCULAR; INTRAVENOUS EVERY 6 HOURS PRN
Status: DISCONTINUED | OUTPATIENT
Start: 2023-10-31 | End: 2023-10-31

## 2023-10-31 RX ORDER — KETOROLAC TROMETHAMINE 15 MG/ML
15 INJECTION, SOLUTION INTRAMUSCULAR; INTRAVENOUS EVERY 6 HOURS
Status: DISCONTINUED | OUTPATIENT
Start: 2023-10-31 | End: 2023-10-31

## 2023-10-31 RX ORDER — KETOROLAC TROMETHAMINE 15 MG/ML
15 INJECTION, SOLUTION INTRAMUSCULAR; INTRAVENOUS EVERY 6 HOURS
Status: COMPLETED | OUTPATIENT
Start: 2023-10-31 | End: 2023-11-01

## 2023-10-31 RX ORDER — TIZANIDINE 2 MG/1
2 TABLET ORAL NIGHTLY
Status: DISCONTINUED | OUTPATIENT
Start: 2023-10-31 | End: 2023-11-01 | Stop reason: HOSPADM

## 2023-10-31 RX ADMIN — TIZANIDINE 2 MG: 2 TABLET ORAL at 21:46

## 2023-10-31 RX ADMIN — SODIUM CHLORIDE, PRESERVATIVE FREE 10 ML: 5 INJECTION INTRAVENOUS at 21:47

## 2023-10-31 RX ADMIN — DILTIAZEM HYDROCHLORIDE 240 MG: 240 CAPSULE, COATED, EXTENDED RELEASE ORAL at 09:17

## 2023-10-31 RX ADMIN — SODIUM CHLORIDE, PRESERVATIVE FREE 10 ML: 5 INJECTION INTRAVENOUS at 09:18

## 2023-10-31 RX ADMIN — DEXAMETHASONE SODIUM PHOSPHATE 4 MG: 4 INJECTION, SOLUTION INTRAMUSCULAR; INTRAVENOUS at 21:46

## 2023-10-31 RX ADMIN — KETOROLAC TROMETHAMINE 15 MG: 15 INJECTION, SOLUTION INTRAMUSCULAR; INTRAVENOUS at 23:37

## 2023-10-31 RX ADMIN — DEXAMETHASONE SODIUM PHOSPHATE 4 MG: 4 INJECTION, SOLUTION INTRAMUSCULAR; INTRAVENOUS at 11:16

## 2023-10-31 RX ADMIN — BUDESONIDE AND FORMOTEROL FUMARATE DIHYDRATE 2 PUFF: 160; 4.5 AEROSOL RESPIRATORY (INHALATION) at 07:45

## 2023-10-31 RX ADMIN — TIOTROPIUM BROMIDE INHALATION SPRAY 2 PUFF: 3.12 SPRAY, METERED RESPIRATORY (INHALATION) at 07:45

## 2023-10-31 RX ADMIN — OXYCODONE AND ACETAMINOPHEN 1 TABLET: 5; 325 TABLET ORAL at 09:16

## 2023-10-31 RX ADMIN — RIVAROXABAN 20 MG: 20 TABLET, FILM COATED ORAL at 17:29

## 2023-10-31 RX ADMIN — ESCITALOPRAM OXALATE 20 MG: 10 TABLET ORAL at 09:17

## 2023-10-31 RX ADMIN — BUDESONIDE AND FORMOTEROL FUMARATE DIHYDRATE 2 PUFF: 160; 4.5 AEROSOL RESPIRATORY (INHALATION) at 21:35

## 2023-10-31 RX ADMIN — GADOTERIDOL 16 ML: 279.3 INJECTION, SOLUTION INTRAVENOUS at 10:42

## 2023-10-31 RX ADMIN — ATORVASTATIN CALCIUM 40 MG: 40 TABLET, FILM COATED ORAL at 09:17

## 2023-10-31 RX ADMIN — BUSPIRONE HYDROCHLORIDE 30 MG: 15 TABLET ORAL at 09:17

## 2023-10-31 RX ADMIN — KETOROLAC TROMETHAMINE 15 MG: 15 INJECTION, SOLUTION INTRAMUSCULAR; INTRAVENOUS at 11:15

## 2023-10-31 RX ADMIN — DEXAMETHASONE SODIUM PHOSPHATE 4 MG: 4 INJECTION, SOLUTION INTRAMUSCULAR; INTRAVENOUS at 17:29

## 2023-10-31 RX ADMIN — KETOROLAC TROMETHAMINE 15 MG: 15 INJECTION, SOLUTION INTRAMUSCULAR; INTRAVENOUS at 17:29

## 2023-10-31 ASSESSMENT — PAIN DESCRIPTION - DESCRIPTORS
DESCRIPTORS: ACHING
DESCRIPTORS: ACHING;THROBBING

## 2023-10-31 ASSESSMENT — PAIN DESCRIPTION - LOCATION
LOCATION: HEAD

## 2023-10-31 ASSESSMENT — PAIN SCALES - GENERAL
PAINLEVEL_OUTOF10: 3
PAINLEVEL_OUTOF10: 9
PAINLEVEL_OUTOF10: 5
PAINLEVEL_OUTOF10: 8
PAINLEVEL_OUTOF10: 5

## 2023-10-31 ASSESSMENT — PAIN DESCRIPTION - ORIENTATION
ORIENTATION: ANTERIOR
ORIENTATION: POSTERIOR
ORIENTATION: ANTERIOR

## 2023-10-31 NOTE — CONSULTS
Neurology Service Consult Note  [address] [place] [address]   Patient Name: Leward Dancer  : 1945        Subjective:   Reason for consult: headache  78 y.o. - male with history of DM, atrial flutter, HTN, HLD, and lung cancer presenting to [address] [place] [address] for headache. He states this has been present for over a month and has not had relief. He states it is located in left occipital region and radiates anteriorly to left temporal region. He states it is constant. He has associated blurred vision. He states he was prescribed Fioricet which provided minimal relief. He denies any history of headaches or migraines in the past.     Patient seen and examined. He is sitting up in bed. He states he continues to have left side headache. He is tender to touch in left occipital notch. He denies changing his pillows or bed. Exam is non focal.       Past Medical History:   Diagnosis Date    Alcohol abuse     Atrial flutter ([address])     10/2006-s/p atrial ablation    COPD (chronic obstructive pulmonary disease) ([address])     Diabetes mellitus ([address])     H/O 24 hour EKG monitoring , . 24 hr Holter monitor. Normal    H/O cardiovascular stress test 11/10, 12/07    11/10. EF 64%. Normal myocardial study    H/O echocardiogram 2013,  EF55% LA mild dilated, mild MR, mild-mod TR, mild pulm htn . EF >55%. The left atrium is mildly dilated. History of cardiac monitoring 14    14 day Event-normal with no errythmia    History of cardioversion 2014    Hx of echocardiogram 4/15/2014    sclerotic aortic valve, moderate pulmonary HTN, EF45-50%    Hyperlipidemia     Hypertension     Obesity     :   Past Surgical History:   Procedure Laterality Date    ATRIAL ABLATION SURGERY  10/06    COLONOSCOPY  3/3/15    severe divertics, multiple polyps, hem    HERNIA REPAIR      INFECTED SKIN DEBRIDEMENT  3/12    PACEMAKER INSERTION  10/6/06    Bullous emphysema.  No change

## 2023-11-01 ENCOUNTER — APPOINTMENT (OUTPATIENT)
Dept: MRI IMAGING | Age: 78
DRG: 055 | End: 2023-11-01
Payer: OTHER GOVERNMENT

## 2023-11-01 VITALS
OXYGEN SATURATION: 92 % | BODY MASS INDEX: 26.35 KG/M2 | RESPIRATION RATE: 20 BRPM | TEMPERATURE: 97.4 F | SYSTOLIC BLOOD PRESSURE: 129 MMHG | DIASTOLIC BLOOD PRESSURE: 70 MMHG | WEIGHT: 177.91 LBS | HEIGHT: 69 IN | HEART RATE: 75 BPM

## 2023-11-01 PROBLEM — C79.31 LUNG CANCER METASTATIC TO BRAIN (HCC): Status: ACTIVE | Noted: 2023-11-01

## 2023-11-01 PROBLEM — C34.90 LUNG CANCER METASTATIC TO BRAIN (HCC): Status: ACTIVE | Noted: 2023-11-01

## 2023-11-01 PROCEDURE — G0378 HOSPITAL OBSERVATION PER HR: HCPCS

## 2023-11-01 PROCEDURE — 2580000003 HC RX 258: Performed by: NURSE PRACTITIONER

## 2023-11-01 PROCEDURE — 6360000002 HC RX W HCPCS: Performed by: NURSE PRACTITIONER

## 2023-11-01 PROCEDURE — 6370000000 HC RX 637 (ALT 250 FOR IP): Performed by: NURSE PRACTITIONER

## 2023-11-01 PROCEDURE — 94664 DEMO&/EVAL PT USE INHALER: CPT

## 2023-11-01 PROCEDURE — 94640 AIRWAY INHALATION TREATMENT: CPT

## 2023-11-01 PROCEDURE — APPNB60 APP NON BILLABLE TIME 46-60 MINS: Performed by: PHYSICIAN ASSISTANT

## 2023-11-01 PROCEDURE — 2700000000 HC OXYGEN THERAPY PER DAY

## 2023-11-01 PROCEDURE — 6360000004 HC RX CONTRAST MEDICATION: Performed by: NURSE PRACTITIONER

## 2023-11-01 PROCEDURE — 6370000000 HC RX 637 (ALT 250 FOR IP): Performed by: INTERNAL MEDICINE

## 2023-11-01 PROCEDURE — 99232 SBSQ HOSP IP/OBS MODERATE 35: CPT | Performed by: NURSE PRACTITIONER

## 2023-11-01 PROCEDURE — A9579 GAD-BASE MR CONTRAST NOS,1ML: HCPCS | Performed by: NURSE PRACTITIONER

## 2023-11-01 PROCEDURE — 72156 MRI NECK SPINE W/O & W/DYE: CPT

## 2023-11-01 PROCEDURE — 94150 VITAL CAPACITY TEST: CPT

## 2023-11-01 PROCEDURE — 6360000002 HC RX W HCPCS: Performed by: CLINICAL NURSE SPECIALIST

## 2023-11-01 PROCEDURE — 94761 N-INVAS EAR/PLS OXIMETRY MLT: CPT

## 2023-11-01 PROCEDURE — 99223 1ST HOSP IP/OBS HIGH 75: CPT | Performed by: INTERNAL MEDICINE

## 2023-11-01 PROCEDURE — 1200000000 HC SEMI PRIVATE

## 2023-11-01 RX ORDER — TIZANIDINE 2 MG/1
2 TABLET ORAL NIGHTLY
Qty: 30 TABLET | Refills: 1 | Status: SHIPPED | OUTPATIENT
Start: 2023-11-01

## 2023-11-01 RX ORDER — GUAIFENESIN 600 MG/1
600 TABLET, EXTENDED RELEASE ORAL 2 TIMES DAILY
Status: DISCONTINUED | OUTPATIENT
Start: 2023-11-01 | End: 2023-11-01 | Stop reason: HOSPADM

## 2023-11-01 RX ORDER — OXYCODONE HYDROCHLORIDE AND ACETAMINOPHEN 5; 325 MG/1; MG/1
1 TABLET ORAL EVERY 6 HOURS PRN
Qty: 12 TABLET | Refills: 0 | Status: SHIPPED | OUTPATIENT
Start: 2023-11-01 | End: 2023-11-04

## 2023-11-01 RX ORDER — DEXAMETHASONE 4 MG/1
4 TABLET ORAL DAILY
Qty: 20 TABLET | Refills: 0 | Status: SHIPPED | OUTPATIENT
Start: 2023-11-01 | End: 2023-11-21

## 2023-11-01 RX ADMIN — GUAIFENESIN 600 MG: 600 TABLET, EXTENDED RELEASE ORAL at 12:06

## 2023-11-01 RX ADMIN — KETOROLAC TROMETHAMINE 15 MG: 15 INJECTION, SOLUTION INTRAMUSCULAR; INTRAVENOUS at 12:09

## 2023-11-01 RX ADMIN — SODIUM CHLORIDE, PRESERVATIVE FREE 10 ML: 5 INJECTION INTRAVENOUS at 08:18

## 2023-11-01 RX ADMIN — DILTIAZEM HYDROCHLORIDE 240 MG: 240 CAPSULE, COATED, EXTENDED RELEASE ORAL at 08:16

## 2023-11-01 RX ADMIN — TIOTROPIUM BROMIDE INHALATION SPRAY 2 PUFF: 3.12 SPRAY, METERED RESPIRATORY (INHALATION) at 09:30

## 2023-11-01 RX ADMIN — BUDESONIDE AND FORMOTEROL FUMARATE DIHYDRATE 2 PUFF: 160; 4.5 AEROSOL RESPIRATORY (INHALATION) at 09:31

## 2023-11-01 RX ADMIN — ESCITALOPRAM OXALATE 20 MG: 10 TABLET ORAL at 08:17

## 2023-11-01 RX ADMIN — BUSPIRONE HYDROCHLORIDE 30 MG: 15 TABLET ORAL at 08:17

## 2023-11-01 RX ADMIN — DEXAMETHASONE SODIUM PHOSPHATE 4 MG: 4 INJECTION, SOLUTION INTRAMUSCULAR; INTRAVENOUS at 12:07

## 2023-11-01 RX ADMIN — GADOTERIDOL 15 ML: 279.3 INJECTION, SOLUTION INTRAVENOUS at 11:44

## 2023-11-01 RX ADMIN — DEXAMETHASONE SODIUM PHOSPHATE 4 MG: 4 INJECTION, SOLUTION INTRAMUSCULAR; INTRAVENOUS at 06:12

## 2023-11-01 RX ADMIN — ATORVASTATIN CALCIUM 40 MG: 40 TABLET, FILM COATED ORAL at 08:17

## 2023-11-01 RX ADMIN — KETOROLAC TROMETHAMINE 15 MG: 15 INJECTION, SOLUTION INTRAMUSCULAR; INTRAVENOUS at 06:12

## 2023-11-01 ASSESSMENT — PAIN DESCRIPTION - ORIENTATION: ORIENTATION: ANTERIOR

## 2023-11-01 ASSESSMENT — PAIN DESCRIPTION - LOCATION
LOCATION: HEAD
LOCATION: HEAD

## 2023-11-01 ASSESSMENT — PAIN SCALES - GENERAL
PAINLEVEL_OUTOF10: 2
PAINLEVEL_OUTOF10: 6

## 2023-11-01 ASSESSMENT — PAIN DESCRIPTION - DESCRIPTORS: DESCRIPTORS: PRESSURE

## 2023-11-01 NOTE — CONSULTS
[address], [address] [place] [address]                                  CONSULTATION    PATIENT NAME: Baltazar Mcardle                  :        1945  MED REC NO:   [id number]                          ROOM:       [id number]  ACCOUNT NO:   [de-identified]                           ADMIT DATE: 10/30/2023  PROVIDER:     Marissa Ramirez MD    CONSULT DATE:  2023    HISTORY OF PRESENT ILLNESS:  The patient is an 68-year-old gentleman  with multiple medical problems including COPD, chronic respiratory  failure on home oxygen, diabetes, hypertension, hyperlipidemia, who was  admitted through the emergency room with headaches associated with  nausea. He denied any vomiting. He has some cough productive of  whitish phlegm. He denies any hemoptysis. He denies any chest pains. The patient was seen earlier this year in my office for a lung mass and  was scheduled for lung biopsy. The patient then canceled the biopsy and  did not want it and did not follow up in the office. His CAT scan of  the chest showed spiculated nodule in the left upper lobe 1.4 x 1.2 cm  in size and another mass in the right upper lobe measuring 3.5 x 1.7 cm. PAST MEDICAL HISTORY:  Significant for COPD, chronic respiratory failure  on home oxygen, diabetes, hypertension, hyperlipidemia. PAST SURGICAL HISTORY:  Remarkable for atrial ablation surgery,  colonoscopy, hernia repair. FAMILY HISTORY:  Reveals his mother has hypertension. SOCIAL HISTORY:  Reveals that he smokes about half-pack per day and has  been smoking for 40 years. He drinks alcohol off and on. No history of  drug abuse. MEDICATIONS:  Reviewed. ALLERGIES:  He has no known allergies. REVIEW OF SYSTEMS:  10 to 14-point review of systems were reviewed and  are negative, except for what is mentioned in history of present  illness.     PHYSICAL EXAMINATION:  GENERAL:  The patient is Advancement Flap (Double) Text: The defect edges were debeveled with a #15 scalpel blade.  Given the location of the defect and the proximity to free margins a double advancement flap was deemed most appropriate.  Using a sterile surgical marker, the appropriate advancement flaps were drawn incorporating the defect and placing the expected incisions within the relaxed skin tension lines where possible.    The area thus outlined was incised deep to adipose tissue with a #15 scalpel blade.  The skin margins were undermined to an appropriate distance in all directions utilizing iris scissors.

## 2023-11-01 NOTE — PROGRESS NOTES
11/01/23 0843   Encounter Summary   Encounter Overview/Reason  Initial Encounter   Service Provided For: Patient   Referral/Consult From: Nurse   [id number] Yannick Worley   Last Encounter  11/01/23  (Patient is [place], stoic, man of strong william ([address] Good support team (sons, L[employer] family). Good discussion and time of prayer. No other needs at  this time. Card given to patient for  as resource.)   Complexity of Encounter High   Begin Time 0815   End Time  0848   Total Time Calculated 33 min   Spiritual/Emotional needs   Type Spiritual Support   Grief, Loss, and Adjustments   Type Adjustment to illness   Assessment/Intervention/Outcome   Assessment Calm;Coping; Hopeful;Peaceful  (Patient diagnosed with CA; determining options and tx's.)   Intervention Active listening;Discussed relationship with God;Nurtured Hope;Prayer (assurance of)/[place];Sustaining Presence/Ministry of presence   Outcome Comfort;Coping;Encouraged;Engaged in conversation;Expressed feelings, needs, and concerns;Expressed Gratitude   Plan and Referrals   Plan/Referrals Continue Support (comment)  (Patient aware how to contact .)
4 Eyes Skin Assessment     NAME:  Mckinley Dykes  YOB: 1945  MEDICAL RECORD NUMBER:  [id number]    The patient is being assessed for  Admission    I agree that at least one RN has performed a thorough Head to Toe Skin Assessment on the patient. ALL assessment sites listed below have been assessed. Areas assessed by both nurses:    Head, Face, Ears, Shoulders, Back, Chest, Arms, Elbows, Hands, Sacrum. Buttock, Coccyx, Ischium, and Legs. Feet and Heels        Does the Patient have a Wound?  No noted wound(s)       Satish Prevention initiated by RN: No  Wound Care Orders initiated by RN: No    Pressure Injury (Stage 3,4, Unstageable, DTI, NWPT, and Complex wounds) if present, place Wound referral order by RN under : No    New Ostomies, if present place, Ostomy referral order under : No     Nurse 1 eSignature: Electronically signed by Khushi Persaud RN on 10/30/23 at 6:07 PM EDT    **SHARE this note so that the co-signing nurse can place an eSignature**    Nurse 2 eSignature: Electronically signed by Claudia Solomon RN on 10/31/23 at 5:42 AM EDT
Comprehensive Nutrition Assessment    Type and Reason for Visit:  Initial, Positive Nutrition Screen (MST:2)    Nutrition Recommendations/Plan:   Start Standard high calorie , high protein oral nutrition supplement daily   Recommend 1-2 snacks between meals at home  Continue Regular diet, Endorse adequate PO intakes/ONS Intake and record in I/O      Malnutrition Assessment:  Malnutrition Status: At risk for malnutrition (Comment) (10/31/23 1523)    Context:  Social/Environmental Circumstances     Findings of the 6 clinical characteristics of malnutrition:  Energy Intake:  Less than 75% estimated energy requirements for 3 months or longer (eats 2 times per day (oatmeal, and one balanced meal per day per diet recall))  Weight Loss:  Mild weight loss (specify amount and time period) (5.6% in est. 2-4 months)     Body Fat Loss:  Mild body fat loss Triceps   Muscle Mass Loss:  Mild muscle mass loss Calf (gastrocnemius)  Fluid Accumulation:  No significant fluid accumulation Extremities   Strength:  Not Performed    Nutrition Assessment:    Admitted with intractable headache/migraine over 1.5 months. Hx COPD, T2DM, Etoh Abuse, HTN, HLD, [facility] Currently on regular diet with diabetic supplements started. C/o reduced appetite/po intake pta s/s taste changes in the last couple of years. Consumed % of meal at bedside. States UBW of 190 lb, high weight of 210 lb RT steroid use. Pt had lost wt s/s d/c steroids. Pt states cooks for self, eats 2 times per day, discussed considering oral nutrition supplements, multivitamin, and additional snacks for better nutrition at home. Mild weight loss noted. Performed NFPE, mild wasting examined, pt is at risk for malnutrition. Follow as moderate nutrition risk.     Nutrition Related Findings:    Hgb 5.1, Glu 101 +Decadron Wound Type: None       Current Nutrition Intake & Therapies:    Average Meal Intake: %  Average Supplements Intake: Unable to assess  ADULT DIET;
Discussed IR procedure with Yoni Smith CM regarding lung biopsy. Chart was reviewed. Patient is under care of the [address] [place] [address] and will need to continue to see the [address] [place] [address] doctors for treatment. Notified Marycruz LEDESMA in [facility]. IR will complete consult at this time.
Neurology Service Progress Note  [facility]   Patient Name: Saud Martines  : 1945        Subjective:   CC: headache  Chart was reviewed in detail, patient was seen and assessed. He is resting in bed today and is awake, alert and oriented. He tells me that his headache is much improved. Discussed with him that this is likely from initiation of steroid therapy. MRI cervical spine has been completed as well. Patient was offered biopsy today but he declined. Oncology has been consulted, patient is considering next steps. He does follow with the [address] [place] [address] so unclear if he will be able to continue to follow with outpatient oncology team.  Treatment will be determined by biopsy results. Past Medical History:   Diagnosis Date    Alcohol abuse     Atrial flutter ([address])     10/2006-s/p atrial ablation    COPD (chronic obstructive pulmonary disease) ([address])     Diabetes mellitus ([address])     H/O 24 hour EKG monitoring , . 24 hr Holter monitor. Normal    H/O cardiovascular stress test 11/10, 12/07    11/10. EF 64%. Normal myocardial study    H/O echocardiogram 2013,  EF55% LA mild dilated, mild MR, mild-mod TR, mild pulm htn . EF >55%. The left atrium is mildly dilated. History of cardiac monitoring 14    14 day Event-normal with no errythmia    History of cardioversion 2014    Hx of echocardiogram 4/15/2014    sclerotic aortic valve, moderate pulmonary HTN, EF45-50%    Hyperlipidemia     Hypertension     Obesity     :   Past Surgical History:   Procedure Laterality Date    ATRIAL ABLATION SURGERY  10/2006    ATRIAL ABLATION SURGERY  10/06/2006    Bullous emphysema. No change compared with the previous exam.    COLONOSCOPY  2015    severe divertics, multiple polyps, hem    HERNIA REPAIR      INFECTED SKIN DEBRIDEMENT  2012     Medications:  Scheduled Meds:      Continuous Infusions:      PRN Meds:.     No Known Allergies  Social
Pt had contrast 7 hrs ago - mri cervical ordered- Dr Duglas Oconnor (Radiologist ) would like to wait 24 hrs for pre contrast imaging to get an adequate study prevent any visualization of contrast in the spine.
V2.0  [facility] Hospitalist Progress Note      Name:  Brendon Lane /Age/Sex:   (78 y.o. male)   MRN & CSN:  [id number] & [id number] Encounter Date/Time: 10/31/2023 2:24 PM EDT    Location:  OBS  PCP: Pablo Sanford MD       Hospital Day: 2    Assessment and Plan:   Brendon Lane is a 78 y.o. male  who presents with Intractable headache, unspecified chronicity pattern, unspecified headache type    Intractable headache with -patient reports ongoing headache for the past month. CT of the head was nonacute. MRI of the brain with and without contrast suggestive of extradural metastatic disease. 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 A dedicated MRI of the cervical spine with and without contrast recommended to better evaluate the full extent of disease as the disease is only partially imaged on this exam.   -Neurology consulted pending recs, will consult Oncology  -Pain was managed with IV oral pain oxycodone and morphine for severe pain ordered, Neurology added Decadron     Paroxysmal atrial fibrillation on AC with Xarelto and managed on diltiazem for rate control, monitored on telemetry     COPD\chronic hypoxic respiratory failure on home O2 at 3 L per nasal cannula-not in exacerbation, remained on baseline oxygen requirement, home bronchodilators and theophylline resumed     Lung mass, suspicion for malignacy-followed by pulmonology. Recent CT chest 10/11/2023 shows enlarging right upper lobe and left upper lobe spiculated lung masses compatible with progressive synchronous lung neoplasms, enlarging metastatic mediastinal adenopathy the largest right peritracheal hilar mass measuring 6.5 x 6.2 cm that appears to invade the tracheal lumen compatible with progressive tumor no evidence of metastatic disease in the abdomen or pelvis.   Patient
and into the C2 vertebral body. This is suggestive of metastatic disease to the skull base. There is enhancement along the dural surface of the inferior aspect of the right cerebellum consistent with extradural metastatic disease. An epidural component in the cervical region is difficult to exclude as the canal is only visualized on the inferior aspect of the images. A dedicated MRI of the cervical spine starting at the level of the IAC is suggested to further evaluate the extent of disease. ORBITS: The visualized portion of the orbits demonstrate no acute abnormality. SINUSES: There is minimal mucoperiosteal thickening of the ethmoid air cells. The remainder of the sinuses are clear. There is partial opacification of the left mastoid air cells greater than the right. BONES/SOFT TISSUES: The bone marrow signal intensity appears normal. The soft tissues demonstrate no acute abnormality. There is enhancement along the dural surface of the inferior aspect of the left cerebellum suggestive of extradural metastatic disease. 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 A dedicated MRI of the cervical spine with and without contrast could better evaluate the full extent of disease as the disease is only partially imaged on this exam.  Additional coronal and sagittal views are suggested through the cervical region. No other brain parenchymal metastasis. Cerebral atrophy and chronic small vessel ischemic disease.        Electronically signed by BRIAN Jensen CNP on 11/1/2023 at 10:58 AM

## 2023-11-01 NOTE — DISCHARGE SUMMARY
V2.0  Discharge Summary    Name:  Alina Thorne /Age/Sex:  (78 y.o. male)   Admit Date: 10/30/2023  Discharge Date: 23    MRN & CSN:  [id number] & [id number] Encounter Date and Time 23 1:28 PM EDT    Attending:  Sujatha Chavez MD Discharging Provider: BRIAN Alexis - CNP       Hospital Course:     Brief HPI: Alina Thorne is a 78 y.o. male with a history of atrial fibrillation, COPD, chronic respiratory failure, lung cancer who presented with intractable headache. Patient's MRI of the brain and the cervical indicated metastatic mass in left cerebellum extradural.  Oncology, pulmonology and neurology consulted. Dr. Camille Downey recommended IR consult for lung biopsy. Pt preferred to do it outpatient. Dr. Socorro Soto will follow up with patient in his clinic after receiving biopsy result. Neurology will follow up with patient in clinic for headache and possible seizure activity in the future. Discussed the above plan with patient and his sister in the room. Also had a discussion with patient's son Elton Deal in the phone. They all understand and agree the above plan. They are allowed to ask questions. Also suggested patient to consider hospice consult. Pt stated he wants all treatment for now. Prescription of decadron, tizanidine and oxycodone given. Brief Problem Based Course:   Intractable headache due to metastatic cancer in brain  -reported throbbing pain in left side head for 1.5 month, associate with nausea  -No focal neurology deficit  -NIH score 0  -MRI of the brain with and without contrast: There is enhancement along the dural surface of the inferior aspect of the left cerebellum suggestive of extradural metastatic disease. 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    -pt reported improving headache today  -oncology, pulmonology

## 2023-11-01 NOTE — CONSULTS
Hematology/Oncology Consult Note    Patient Name:  Des Belle  Patient :  1945  Patient MRN:  [id number]     Primary Oncologist: Denis Clark MD  PCP: Eusebia Arreguin MD     Date of Service: 10/30/2023      Reason for Consult: Headache, brain mass, lung mass     Chief Complaint:    Chief Complaint   Patient presents with    Headache     X1 month, has been going to the [place] for outpatient testing, but reports the headache became worse today. Principal Problem:    Intractable headache, unspecified chronicity pattern, unspecified headache type  Resolved Problems:    * No resolved hospital problems. *      HPI:   Des Belle is a pleasant 78 y.o. male with a history of COPD who presented to [facility] complaining of worsening headache over 1 month. It appears during an ED visit on 10/11/2023 for similar complaints he had CT CAP and CT Head which demonstrated abnormal findings and he followed up with his [facility] as recommended. He states that his [facility] was ordering an MRI however he had not heard back and was concerned to wait too long as it was worsening. Per  [facility] documentation available in Care Everywhere it does appear he had cervical mediastinoscopy in 2022 with station R4 LN biopsy which was negative for malignancy. Multiple lung nodules are noted as a problem during [place] encounter 10/24/2023 but full records are not available. Will request [place] records although typically will take some time to receive. MRI of the brain w/wo contrast done here was significant for enhancement along dural surface of inferior aspect of L cerebellum suggestive of extradural metastatic disease, difficult to exclude epidural component. No disease within the cerebral parenchyma noted. Findings in C1 and C2 were suggestive of bony metastatic disease, dedicated MRI C-Spine is ordered and pending and should also evaluate for epidural involvement of dural lesion.  CT CAP on previous ED visit

## 2023-11-02 ENCOUNTER — CLINICAL DOCUMENTATION (OUTPATIENT)
Dept: ONCOLOGY | Age: 78
End: 2023-11-02

## 2023-11-05 NOTE — PROGRESS NOTES
Patient Name:  Rocky Ocampo  Patient :    Patient MRN:  [id number]     Primary Oncologist: Kaya Browning MD  Referring Provider: Lianna Christina MD     Date of Service: 2023      Reason for Consult:  ***      Chief Complaint:  No chief complaint on file. 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)       HPI: ***     Past Medical History: ***     Past Surgery History:  ***    Social History: ***    Family History: ***    No Known Allergies    Current Outpatient Medications on File Prior to Visit   Medication Sig Dispense Refill    dexamethasone (DECADRON) 4 MG tablet Take 1 tablet by mouth daily for 20 days 20 tablet 0    tiZANidine (ZANAFLEX) 2 MG tablet Take 1 tablet by mouth nightly 30 tablet 1    butalbital-acetaminophen-caffeine (FIORICET, ESGIC) -40 MG per tablet Take 1 tablet by mouth every 4 hours as needed for Headaches 180 tablet 3    fluticasone-umeclidin-vilant (TRELEGY ELLIPTA) 100-62.5-25 MCG/INH AEPB Inhale 1 puff into the lungs daily      busPIRone (BUSPAR) 30 MG tablet Take 30 mg by mouth daily      rivaroxaban (XARELTO) 20 MG TABS tablet Take 1 tablet by mouth Daily. **Pt started on 4/15/14 w/samples. ** 90 tablet 3    atorvastatin (LIPITOR) 40 MG tablet Take 1 tablet by mouth daily      dilTIAZem (CARDIZEM CD) 240 MG extended release capsule Take 1 capsule by mouth daily      escitalopram (LEXAPRO) 20 MG tablet Take 1 tablet by mouth daily      OXYGEN 2.5 L nightly. No current facility-administered medications on file prior to visit.         Review of

## 2023-11-07 NOTE — PROGRESS NOTES
Patient Name: Leward Dancer  Patient : 1945  Patient MRN: [id number]     Primary Oncologist: Shelley Cortez MD  Referring Provider: Clotilde Casey MD     Date of Service: 2023      Chief Complaint:   Chief Complaint   Patient presents with    New Patient     FU visit. Patient Active Problem List:     Hypertension     Typical atrial flutter (HCC)     Diabetes mellitus     Hyperlipidemia     Obesity     Chronic cor pulmonale     Chronic respiratory failure     GI bleed     COPD, severe     Breast mass, right     Chest mass     Spigelian hernia     Abdominal hernia     S/P laparoscopic hernia repair     Intractable headache, unspecified chronicity pattern, unspecified headache type     Lung cancer metastatic to brain [facility])       HPI:   Leward Dancer is a pleasant 78 y.o. male with a history of COPD who presented to [facility] complaining of worsening headache over 1 month. It appears during an ED visit on 10/11/2023 for similar complaints he had CT CAP and CT Head which demonstrated abnormal findings and he followed up with his [facility] as recommended. He states that his [facility] was ordering an MRI however he had not heard back and was concerned to wait too long as it was worsening. Per  [facility] documentation available in Care Everywhere it does appear he had cervical mediastinoscopy in 2022 with station R4 LN biopsy which was negative for malignancy. Multiple lung nodules are noted as a problem during [place] encounter 10/24/2023 but full records are not available. Will request [place] records although typically will take some time to receive. MRI of the brain w/wo contrast done here was significant for enhancement along dural surface of inferior aspect of L cerebellum suggestive of extradural metastatic disease, difficult to exclude epidural component. No disease within the cerebral parenchyma noted.  Findings in C1 and C2 were suggestive of bony metastatic disease, dedicated MRI

## 2023-11-08 ENCOUNTER — OFFICE VISIT (OUTPATIENT)
Dept: ONCOLOGY | Age: 78
End: 2023-11-08
Payer: OTHER GOVERNMENT

## 2023-11-08 ENCOUNTER — HOSPITAL ENCOUNTER (OUTPATIENT)
Dept: INFUSION THERAPY | Age: 78
Discharge: HOME | End: 2023-11-08
Payer: OTHER GOVERNMENT

## 2023-11-08 VITALS
RESPIRATION RATE: 22 BRPM | TEMPERATURE: 98.2 F | HEART RATE: 121 BPM | WEIGHT: 176 LBS | HEIGHT: 69 IN | SYSTOLIC BLOOD PRESSURE: 132 MMHG | OXYGEN SATURATION: 92 % | DIASTOLIC BLOOD PRESSURE: 71 MMHG | BODY MASS INDEX: 26.07 KG/M2

## 2023-11-08 DIAGNOSIS — R91.8 LUNG MASS: Primary | ICD-10-CM

## 2023-11-08 PROCEDURE — 1123F ACP DISCUSS/DSCN MKR DOCD: CPT | Performed by: INTERNAL MEDICINE

## 2023-11-08 PROCEDURE — 99214 OFFICE O/P EST MOD 30 MIN: CPT | Performed by: INTERNAL MEDICINE

## 2023-11-08 PROCEDURE — 3075F SYST BP GE 130 - 139MM HG: CPT | Performed by: INTERNAL MEDICINE

## 2023-11-08 PROCEDURE — 3078F DIAST BP <80 MM HG: CPT | Performed by: INTERNAL MEDICINE

## 2023-11-08 PROCEDURE — 99211 OFF/OP EST MAY X REQ PHY/QHP: CPT

## 2023-11-13 ENCOUNTER — OFFICE VISIT (OUTPATIENT)
Dept: NEUROLOGY | Age: 78
End: 2023-11-13
Payer: MEDICARE

## 2023-11-13 VITALS
WEIGHT: 171 LBS | SYSTOLIC BLOOD PRESSURE: 116 MMHG | HEIGHT: 69 IN | OXYGEN SATURATION: 94 % | DIASTOLIC BLOOD PRESSURE: 70 MMHG | HEART RATE: 122 BPM | BODY MASS INDEX: 25.33 KG/M2

## 2023-11-13 DIAGNOSIS — C34.90 LUNG CANCER METASTATIC TO BRAIN (HCC): Primary | ICD-10-CM

## 2023-11-13 DIAGNOSIS — R51.9 INTRACTABLE HEADACHE, UNSPECIFIED CHRONICITY PATTERN, UNSPECIFIED HEADACHE TYPE: ICD-10-CM

## 2023-11-13 DIAGNOSIS — C79.31 LUNG CANCER METASTATIC TO BRAIN (HCC): Primary | ICD-10-CM

## 2023-11-13 PROCEDURE — 3078F DIAST BP <80 MM HG: CPT | Performed by: NURSE PRACTITIONER

## 2023-11-13 PROCEDURE — 1123F ACP DISCUSS/DSCN MKR DOCD: CPT | Performed by: NURSE PRACTITIONER

## 2023-11-13 PROCEDURE — 99214 OFFICE O/P EST MOD 30 MIN: CPT | Performed by: NURSE PRACTITIONER

## 2023-11-13 PROCEDURE — 3074F SYST BP LT 130 MM HG: CPT | Performed by: NURSE PRACTITIONER

## 2023-11-13 RX ORDER — TIZANIDINE 2 MG/1
2 TABLET ORAL NIGHTLY
Qty: 30 TABLET | Refills: 3 | Status: SHIPPED | OUTPATIENT
Start: 2023-11-13

## 2023-11-13 RX ORDER — DEXAMETHASONE 4 MG/1
4 TABLET ORAL DAILY
Qty: 20 TABLET | Refills: 1 | Status: SHIPPED | OUTPATIENT
Start: 2023-11-13 | End: 2023-12-03

## 2023-11-13 RX ORDER — OMEPRAZOLE 20 MG/1
20 CAPSULE, DELAYED RELEASE ORAL DAILY
Qty: 30 CAPSULE | Refills: 3 | Status: SHIPPED | OUTPATIENT
Start: 2023-11-13

## 2023-11-17 ENCOUNTER — CLINICAL DOCUMENTATION (OUTPATIENT)
Dept: ONCOLOGY | Age: 78
End: 2023-11-17

## 2023-11-17 ENCOUNTER — TELEPHONE (OUTPATIENT)
Dept: ONCOLOGY | Age: 78
End: 2023-11-17

## 2023-11-17 NOTE — TELEPHONE ENCOUNTER
Patients son called stating patients biopsy is scheduled 11/29 and inquired if follow up with Dr. Holly Prieto needed to be changed to after the biopsy. This RN changed appointment with Dr. Holly Prieto to Thursday 12/7/2023 at 1330. Patients son aware of appointment change and agreeable to time and date. Patients son has no further questions or needs at this time.

## 2023-11-22 ENCOUNTER — CLINICAL DOCUMENTATION (OUTPATIENT)
Dept: ONCOLOGY | Age: 78
End: 2023-11-22

## 2023-11-27 NOTE — PROGRESS NOTES
IR Procedure at [facility]:  Spoke with patient and he will arrive at 0730 at [facility] on 11/29/2023 for his procedure at 0900. Also went over below instructions and told patient to take his medications as scheduled, except for his xarelto,last dose was on 11/26/2023. NPO at [address]     2. Follow your directions as prescribed by the doctor for your procedure and medications. 3.   Consult your provider as to when to stop blood thinner  4. Do not take any pain medication within 6 hours of your procedure  5. Do not drink any alcoholic beverages or use any street drugs 24 hours before procedure. 6.   Please wear simple, loose fitting clothing to the hospital.  Do not bring valuables (money,             credit cards, checkbooks, etc.)     7. If you  have a Living Will and Durable Power of  for Healthcare, please bring in a copy. 8.   Please bring picture ID,  insurance card, paperwork from the doctors office            (H & P, Consent,  & card for implantable devices). 9.   Report to the information desk on the ground floor. 10. Take a shower the night before or morning of your procedure, do not apply any lotion, oil or powder. 11. If you are going to be sedated for the procedure, you will need a responsible adult to drive you home.

## 2023-11-29 ENCOUNTER — HOSPITAL ENCOUNTER (OUTPATIENT)
Dept: GENERAL RADIOLOGY | Age: 78
Discharge: HOME | End: 2023-11-29
Payer: OTHER GOVERNMENT

## 2023-11-29 ENCOUNTER — HOSPITAL ENCOUNTER (OUTPATIENT)
Dept: CT IMAGING | Age: 78
Discharge: HOME | End: 2023-11-29
Payer: OTHER GOVERNMENT

## 2023-11-29 VITALS
RESPIRATION RATE: 22 BRPM | WEIGHT: 180 LBS | OXYGEN SATURATION: 97 % | DIASTOLIC BLOOD PRESSURE: 84 MMHG | BODY MASS INDEX: 26.66 KG/M2 | SYSTOLIC BLOOD PRESSURE: 131 MMHG | TEMPERATURE: 98.2 F | HEIGHT: 69 IN | HEART RATE: 81 BPM

## 2023-11-29 DIAGNOSIS — R91.1 PULMONARY NODULE: ICD-10-CM

## 2023-11-29 LAB
APTT: 29.7 SECONDS (ref 25.1–37.1)
HCT VFR BLD CALC: 40.7 % (ref 42–52)
HEMOGLOBIN: 12.3 GM/DL (ref 13.5–18)
INR BLD: 1.1 INDEX
MCH RBC QN AUTO: 31.1 PG (ref 27–31)
MCHC RBC AUTO-ENTMCNC: 30.2 % (ref 32–36)
MCV RBC AUTO: 102.8 FL (ref 78–100)
PDW BLD-RTO: 15 % (ref 11.7–14.9)
PLATELET # BLD: 193 K/CU MM (ref 140–440)
PMV BLD AUTO: 9.7 FL (ref 7.5–11.1)
PROTHROMBIN TIME: 13.9 SECONDS (ref 11.7–14.5)
RBC # BLD: 3.96 M/CU MM (ref 4.6–6.2)
WBC # BLD: 17.1 K/CU MM (ref 4–10.5)

## 2023-11-29 PROCEDURE — 88333 PATH CONSLTJ SURG CYTO XM 1: CPT | Performed by: PATHOLOGY

## 2023-11-29 PROCEDURE — 71045 X-RAY EXAM CHEST 1 VIEW: CPT

## 2023-11-29 PROCEDURE — 85610 PROTHROMBIN TIME: CPT

## 2023-11-29 PROCEDURE — 85730 THROMBOPLASTIN TIME PARTIAL: CPT

## 2023-11-29 PROCEDURE — 88341 IMHCHEM/IMCYTCHM EA ADD ANTB: CPT | Performed by: PATHOLOGY

## 2023-11-29 PROCEDURE — 32408 CORE NDL BX LNG/MED PERQ: CPT

## 2023-11-29 PROCEDURE — 88305 TISSUE EXAM BY PATHOLOGIST: CPT | Performed by: PATHOLOGY

## 2023-11-29 PROCEDURE — 85027 COMPLETE CBC AUTOMATED: CPT

## 2023-11-29 PROCEDURE — 2500000003 HC RX 250 WO HCPCS: Performed by: RADIOLOGY

## 2023-11-29 PROCEDURE — 88342 IMHCHEM/IMCYTCHM 1ST ANTB: CPT | Performed by: PATHOLOGY

## 2023-11-29 PROCEDURE — 88334 PATH CONSLTJ SURG CYTO XM EA: CPT | Performed by: PATHOLOGY

## 2023-11-29 PROCEDURE — 93005 ELECTROCARDIOGRAM TRACING: CPT | Performed by: RADIOLOGY

## 2023-11-29 RX ORDER — LIDOCAINE HYDROCHLORIDE 10 MG/ML
INJECTION, SOLUTION EPIDURAL; INFILTRATION; INTRACAUDAL; PERINEURAL PRN
Status: COMPLETED | OUTPATIENT
Start: 2023-11-29 | End: 2023-11-29

## 2023-11-29 RX ADMIN — LIDOCAINE HYDROCHLORIDE 10 ML: 10 INJECTION, SOLUTION EPIDURAL; INFILTRATION; INTRACAUDAL; PERINEURAL at 10:13

## 2023-11-29 NOTE — PROGRESS NOTES
Dr. Nicole Chu reviewed CXR film and okay to be discharged home. PIV removed at this time. Discharge instructions reviewed and patient discharged home with family at this time.

## 2023-11-29 NOTE — PROGRESS NOTES
Received back from IR after lung biopsy. Pt. tolerated procedure well. Placed back on bedside monitoring. Aflutter on monitor remains. spO2 is 98% on 3L/NC. Using yankauer and suction when coughing up secretions. Son updated at bedside. Pt. denies pain or needs. Right chest bandaid intact. No s/s of bleeding noted. No respiratory distress noted. Call light in reach.

## 2023-11-29 NOTE — PROGRESS NOTES
Patient received from IR. Report obtained from Ashley Bueno at bedside. Assessment completed and call light in reach.

## 2023-11-29 NOTE — PROGRESS NOTES
PCXR done- Dr Maria Luisa Mancia notified- he personally reviewed imaging. No changes in pt condition at this time. Will continue to monitor.

## 2023-11-29 NOTE — PROGRESS NOTES
TRANSFER - OUT REPORT:  CT guided lung biopsy complete, pt tolerated procedure well, VSS, Band aid in place, clean dry and intact. Follow up chest x rays ordered. Verbal report given to [facility] RN on Иван Shultz being transferred to post op for routine post-op       Report consisted of patient's Situation, Background, Assessment and   Recommendations(SBAR). Information from the following report(s) Nurse Handoff Report was reviewed with the receiving nurse. Opportunity for questions and clarification was provided.       Patient transported with:   Registered Nurse

## 2023-11-29 NOTE — PROGRESS NOTES
EKG completed per order. Dr. Denise Retana and Dr. Alessandro Vogt reviewed EKG and stated it was \"typical A-Flutter. \" Decision made to proceed with procedure.  Notified Lopez Salcedo RN

## 2023-11-29 NOTE — DISCHARGE INSTRUCTIONS
Remove band-aid tomorrow  Keep site clean dry and intact  Call Dr if sign or symptoms of infection occur.

## 2023-11-30 LAB
EKG ATRIAL RATE: 236 BPM
EKG DIAGNOSIS: NORMAL
EKG P AXIS: 264 DEGREES
EKG Q-T INTERVAL: 386 MS
EKG QRS DURATION: 98 MS
EKG QTC CALCULATION (BAZETT): 382 MS
EKG R AXIS: 76 DEGREES
EKG T AXIS: -5 DEGREES
EKG VENTRICULAR RATE: 59 BPM

## 2023-12-01 NOTE — H&P
Date:2023  Name:Mateo Barrientos   :1945   CA#:[id number]    SEX:male   Referring Physician:  Mario Gilbert  Primary Physician: [facility]  Chief Complaint:  Lung mass RUL  History of Present Illness:   Lung mass RUL    HISTORY AND PHYSICAL  No admission diagnoses are documented for this encounter. Past Medical History:  Past Medical History:   Diagnosis Date    Alcohol abuse     Atrial flutter ([address])     10/2006-s/p atrial ablation    COPD (chronic obstructive pulmonary disease) ([address])     Diabetes mellitus ([address])     H/O 24 hour EKG monitoring , . 24 hr Holter monitor. Normal    H/O cardiovascular stress test 11/10, 12/07    11/10. EF 64%. Normal myocardial study    H/O echocardiogram 2013,  EF55% LA mild dilated, mild MR, mild-mod TR, mild pulm htn . EF >55%. The left atrium is mildly dilated. History of cardiac monitoring 14    14 day Event-normal with no errythmia    History of cardioversion 2014    Hx of echocardiogram 4/15/2014    sclerotic aortic valve, moderate pulmonary HTN, EF45-50%    Hyperlipidemia     Hypertension     Obesity        Past Surgical History:  Past Surgical History:   Procedure Laterality Date    ATRIAL ABLATION SURGERY  10/2006    ATRIAL ABLATION SURGERY  10/06/2006    Bullous emphysema.  No change compared with the previous exam.    COLONOSCOPY  2015    severe divertics, multiple polyps, hem    CT NEEDLE BIOPSY LUNG PERCUTANEOUS  2023    CT NEEDLE BIOPSY LUNG PERCUTANEOUS 2023 [address] CT SCAN    HERNIA REPAIR      INFECTED SKIN DEBRIDEMENT  2012       Social History:  Social History     Socioeconomic History    Marital status:      Spouse name: Not on file    Number of children: Not on file    Years of education: Not on file    Highest education level: Not on file   Occupational History    Not on file   Tobacco Use    Smoking status: Former     Packs/day: 0.50     Years: 40.00     Additional pack

## 2023-12-07 ENCOUNTER — HOSPITAL ENCOUNTER (OUTPATIENT)
Dept: INFUSION THERAPY | Age: 78
Discharge: HOME | End: 2023-12-07
Payer: OTHER GOVERNMENT

## 2023-12-07 ENCOUNTER — OFFICE VISIT (OUTPATIENT)
Dept: ONCOLOGY | Age: 78
End: 2023-12-07
Payer: OTHER GOVERNMENT

## 2023-12-07 ENCOUNTER — HOSPITAL ENCOUNTER (OUTPATIENT)
Dept: RADIATION ONCOLOGY | Age: 78
Discharge: HOME | End: 2023-12-07

## 2023-12-07 VITALS
TEMPERATURE: 97.7 F | HEART RATE: 108 BPM | DIASTOLIC BLOOD PRESSURE: 66 MMHG | WEIGHT: 163 LBS | OXYGEN SATURATION: 91 % | SYSTOLIC BLOOD PRESSURE: 114 MMHG | BODY MASS INDEX: 24.14 KG/M2 | HEIGHT: 69 IN

## 2023-12-07 DIAGNOSIS — C34.80 MALIGNANT NEOPLASM OF OVERLAPPING SITES OF LUNG, UNSPECIFIED LATERALITY (HCC): Primary | ICD-10-CM

## 2023-12-07 PROCEDURE — 99215 OFFICE O/P EST HI 40 MIN: CPT | Performed by: INTERNAL MEDICINE

## 2023-12-07 PROCEDURE — 1123F ACP DISCUSS/DSCN MKR DOCD: CPT | Performed by: INTERNAL MEDICINE

## 2023-12-07 PROCEDURE — 99211 OFF/OP EST MAY X REQ PHY/QHP: CPT

## 2023-12-07 PROCEDURE — 3078F DIAST BP <80 MM HG: CPT | Performed by: INTERNAL MEDICINE

## 2023-12-07 PROCEDURE — 3074F SYST BP LT 130 MM HG: CPT | Performed by: INTERNAL MEDICINE

## 2023-12-07 ASSESSMENT — PATIENT HEALTH QUESTIONNAIRE - PHQ9
2. FEELING DOWN, DEPRESSED OR HOPELESS: 0
SUM OF ALL RESPONSES TO PHQ QUESTIONS 1-9: 0
1. LITTLE INTEREST OR PLEASURE IN DOING THINGS: 0
SUM OF ALL RESPONSES TO PHQ QUESTIONS 1-9: 0
SUM OF ALL RESPONSES TO PHQ9 QUESTIONS 1 & 2: 0
SUM OF ALL RESPONSES TO PHQ QUESTIONS 1-9: 0
SUM OF ALL RESPONSES TO PHQ QUESTIONS 1-9: 0

## 2023-12-08 ENCOUNTER — CLINICAL DOCUMENTATION (OUTPATIENT)
Dept: ONCOLOGY | Age: 78
End: 2023-12-08

## 2023-12-08 NOTE — PROGRESS NOTES
ROCIO request faxed to [email or web address] with all required documentation. Confirmation fax received.     Maryann Martel drawn 12/7/2023, request submitted via online portal 12/8/2023    As per Dr. Kelle Tang order

## 2023-12-21 ENCOUNTER — HOSPITAL ENCOUNTER (OUTPATIENT)
Dept: INFUSION THERAPY | Age: 78
Discharge: HOME | End: 2023-12-21

## 2023-12-21 PROBLEM — Z11.59 SCREENING FOR VIRAL DISEASE: Status: ACTIVE | Noted: 2023-12-21

## 2023-12-21 PROBLEM — D64.9 ANEMIA: Status: ACTIVE | Noted: 2023-12-21

## 2023-12-21 PROBLEM — C34.80 MALIGNANT NEOPLASM OF OVERLAPPING SITES OF LUNG (HCC): Status: ACTIVE | Noted: 2023-12-21

## 2023-12-21 NOTE — PROGRESS NOTES
MA Rooming Questions  Patient: Alphonso Montiel  MRN: [id number]    Date: 11/8/2023        JENNIFER Brannon Communicate Risk of Fall with Harm to all staff/Reinforce activity limits and safety measures with patient and family/Tailored Fall Risk Interventions/Visual Cue: Yellow wristband and red socks/Bed in lowest position, wheels locked, appropriate side rails in place/Call bell, personal items and telephone in reach/Instruct patient to call for assistance before getting out of bed or chair/Non-slip footwear when patient is out of bed/[place] to call system/Physically safe environment - no spills, clutter or unnecessary equipment/Purposeful Proactive Rounding/Room/bathroom lighting operational, light cord in reach Communicate Risk of Fall with Harm to all staff/Reinforce activity limits and safety measures with patient and family/Tailored Fall Risk Interventions/Visual Cue: Yellow wristband and red socks/Bed in lowest position, wheels locked, appropriate side rails in place/Call bell, personal items and telephone in reach/Instruct patient to call for assistance before getting out of bed or chair/Non-slip footwear when patient is out of bed/[place] to call system/Physically safe environment - no spills, clutter or unnecessary equipment/Purposeful Proactive Rounding/Room/bathroom lighting operational, light cord in reach

## 2023-12-30 ENCOUNTER — APPOINTMENT (OUTPATIENT)
Dept: CT IMAGING | Age: 78
End: 2023-12-30
Payer: OTHER GOVERNMENT

## 2023-12-30 ENCOUNTER — HOSPITAL ENCOUNTER (EMERGENCY)
Age: 78
Discharge: HOME | End: 2023-12-30
Attending: EMERGENCY MEDICINE
Payer: OTHER GOVERNMENT

## 2023-12-30 VITALS
SYSTOLIC BLOOD PRESSURE: 141 MMHG | HEART RATE: 99 BPM | SYSTOLIC BLOOD PRESSURE: 141 MMHG | BODY MASS INDEX: 23.7 KG/M2 | RESPIRATION RATE: 16 BRPM | BODY MASS INDEX: 23.7 KG/M2 | OXYGEN SATURATION: 93 % | WEIGHT: 160 LBS | TEMPERATURE: 97.7 F | RESPIRATION RATE: 16 BRPM | HEIGHT: 69 IN | DIASTOLIC BLOOD PRESSURE: 88 MMHG | HEIGHT: 69 IN | TEMPERATURE: 97.7 F | HEART RATE: 99 BPM | DIASTOLIC BLOOD PRESSURE: 88 MMHG | OXYGEN SATURATION: 93 % | WEIGHT: 160 LBS

## 2023-12-30 DIAGNOSIS — R51.9 CHRONIC NONINTRACTABLE HEADACHE, UNSPECIFIED HEADACHE TYPE: Primary | ICD-10-CM

## 2023-12-30 DIAGNOSIS — C79.31 LUNG CANCER METASTATIC TO BRAIN (HCC): ICD-10-CM

## 2023-12-30 DIAGNOSIS — Z11.59 SCREENING FOR VIRAL DISEASE: ICD-10-CM

## 2023-12-30 DIAGNOSIS — E78.5 HYPERLIPIDEMIA, UNSPECIFIED HYPERLIPIDEMIA TYPE: ICD-10-CM

## 2023-12-30 DIAGNOSIS — C34.80 MALIGNANT NEOPLASM OF OVERLAPPING SITES OF LUNG, UNSPECIFIED LATERALITY (HCC): ICD-10-CM

## 2023-12-30 DIAGNOSIS — I10 PRIMARY HYPERTENSION: Primary | ICD-10-CM

## 2023-12-30 DIAGNOSIS — G89.29 CHRONIC NONINTRACTABLE HEADACHE, UNSPECIFIED HEADACHE TYPE: Primary | ICD-10-CM

## 2023-12-30 DIAGNOSIS — C34.90 LUNG CANCER METASTATIC TO BRAIN (HCC): ICD-10-CM

## 2023-12-30 LAB
ALBUMIN SERPL-MCNC: 3.7 GM/DL (ref 3.4–5)
ALBUMIN SERPL-MCNC: 3.7 GM/DL (ref 3.4–5)
ALP BLD-CCNC: 113 IU/L (ref 40–129)
ALP BLD-CCNC: 113 IU/L (ref 40–129)
ALT SERPL-CCNC: 13 U/L (ref 10–40)
ALT SERPL-CCNC: 13 U/L (ref 10–40)
ANION GAP SERPL CALCULATED.3IONS-SCNC: 12 MMOL/L (ref 7–16)
ANION GAP SERPL CALCULATED.3IONS-SCNC: 12 MMOL/L (ref 7–16)
AST SERPL-CCNC: 13 IU/L (ref 15–37)
AST SERPL-CCNC: 13 IU/L (ref 15–37)
BASOPHILS ABSOLUTE: 0 K/CU MM
BASOPHILS ABSOLUTE: 0 K/CU MM
BASOPHILS RELATIVE PERCENT: 0.3 % (ref 0–1)
BASOPHILS RELATIVE PERCENT: 0.3 % (ref 0–1)
BILIRUB SERPL-MCNC: 0.3 MG/DL (ref 0–1)
BILIRUB SERPL-MCNC: 0.3 MG/DL (ref 0–1)
BUN SERPL-MCNC: 27 MG/DL (ref 6–23)
BUN SERPL-MCNC: 27 MG/DL (ref 6–23)
CALCIUM SERPL-MCNC: 9.7 MG/DL (ref 8.3–10.6)
CALCIUM SERPL-MCNC: 9.7 MG/DL (ref 8.3–10.6)
CHLORIDE BLD-SCNC: 103 MMOL/L (ref 99–110)
CHLORIDE BLD-SCNC: 103 MMOL/L (ref 99–110)
CO2: 25 MMOL/L (ref 21–32)
CO2: 25 MMOL/L (ref 21–32)
CREAT SERPL-MCNC: 0.8 MG/DL (ref 0.9–1.3)
CREAT SERPL-MCNC: 0.8 MG/DL (ref 0.9–1.3)
DIFFERENTIAL TYPE: ABNORMAL
DIFFERENTIAL TYPE: ABNORMAL
EOSINOPHILS ABSOLUTE: 0.2 K/CU MM
EOSINOPHILS ABSOLUTE: 0.2 K/CU MM
EOSINOPHILS RELATIVE PERCENT: 1.7 % (ref 0–3)
EOSINOPHILS RELATIVE PERCENT: 1.7 % (ref 0–3)
GFR SERPL CREATININE-BSD FRML MDRD: >60 ML/MIN/1.73M2
GFR SERPL CREATININE-BSD FRML MDRD: >60 ML/MIN/1.73M2
GLUCOSE SERPL-MCNC: 104 MG/DL (ref 70–99)
GLUCOSE SERPL-MCNC: 104 MG/DL (ref 70–99)
HCT VFR BLD CALC: 41.5 % (ref 42–52)
HCT VFR BLD CALC: 41.5 % (ref 42–52)
HEMOGLOBIN: 12.2 GM/DL (ref 13.5–18)
HEMOGLOBIN: 12.2 GM/DL (ref 13.5–18)
IMMATURE NEUTROPHIL %: 0.4 % (ref 0–0.43)
IMMATURE NEUTROPHIL %: 0.4 % (ref 0–0.43)
LYMPHOCYTES ABSOLUTE: 3.2 K/CU MM
LYMPHOCYTES ABSOLUTE: 3.2 K/CU MM
LYMPHOCYTES RELATIVE PERCENT: 26 % (ref 24–44)
LYMPHOCYTES RELATIVE PERCENT: 26 % (ref 24–44)
MCH RBC QN AUTO: 31.9 PG (ref 27–31)
MCH RBC QN AUTO: 31.9 PG (ref 27–31)
MCHC RBC AUTO-ENTMCNC: 29.4 % (ref 32–36)
MCHC RBC AUTO-ENTMCNC: 29.4 % (ref 32–36)
MCV RBC AUTO: 108.4 FL (ref 78–100)
MCV RBC AUTO: 108.4 FL (ref 78–100)
MONOCYTES ABSOLUTE: 0.8 K/CU MM
MONOCYTES ABSOLUTE: 0.8 K/CU MM
MONOCYTES RELATIVE PERCENT: 6.5 % (ref 0–4)
MONOCYTES RELATIVE PERCENT: 6.5 % (ref 0–4)
NUCLEATED RBC %: 0 %
NUCLEATED RBC %: 0 %
PDW BLD-RTO: 14.4 % (ref 11.7–14.9)
PDW BLD-RTO: 14.4 % (ref 11.7–14.9)
PLATELET # BLD: 147 K/CU MM (ref 140–440)
PLATELET # BLD: 147 K/CU MM (ref 140–440)
PMV BLD AUTO: 10.1 FL (ref 7.5–11.1)
PMV BLD AUTO: 10.1 FL (ref 7.5–11.1)
POTASSIUM SERPL-SCNC: 4 MMOL/L (ref 3.5–5.1)
POTASSIUM SERPL-SCNC: 4 MMOL/L (ref 3.5–5.1)
RBC # BLD: 3.83 M/CU MM (ref 4.6–6.2)
RBC # BLD: 3.83 M/CU MM (ref 4.6–6.2)
SEGMENTED NEUTROPHILS ABSOLUTE COUNT: 8.1 K/CU MM
SEGMENTED NEUTROPHILS ABSOLUTE COUNT: 8.1 K/CU MM
SEGMENTED NEUTROPHILS RELATIVE PERCENT: 65.1 % (ref 36–66)
SEGMENTED NEUTROPHILS RELATIVE PERCENT: 65.1 % (ref 36–66)
SODIUM BLD-SCNC: 140 MMOL/L (ref 135–145)
SODIUM BLD-SCNC: 140 MMOL/L (ref 135–145)
TOTAL IMMATURE NEUTOROPHIL: 0.05 K/CU MM
TOTAL IMMATURE NEUTOROPHIL: 0.05 K/CU MM
TOTAL NUCLEATED RBC: 0 K/CU MM
TOTAL NUCLEATED RBC: 0 K/CU MM
TOTAL PROTEIN: 6.7 GM/DL (ref 6.4–8.2)
TOTAL PROTEIN: 6.7 GM/DL (ref 6.4–8.2)
WBC # BLD: 12.4 K/CU MM (ref 4–10.5)
WBC # BLD: 12.4 K/CU MM (ref 4–10.5)

## 2023-12-30 PROCEDURE — 85025 COMPLETE CBC W/AUTO DIFF WBC: CPT

## 2023-12-30 PROCEDURE — 96374 THER/PROPH/DIAG INJ IV PUSH: CPT

## 2023-12-30 PROCEDURE — 80053 COMPREHEN METABOLIC PANEL: CPT

## 2023-12-30 PROCEDURE — 6360000004 HC RX CONTRAST MEDICATION: Performed by: EMERGENCY MEDICINE

## 2023-12-30 PROCEDURE — 96376 TX/PRO/DX INJ SAME DRUG ADON: CPT

## 2023-12-30 PROCEDURE — 6360000002 HC RX W HCPCS: Performed by: EMERGENCY MEDICINE

## 2023-12-30 PROCEDURE — 70470 CT HEAD/BRAIN W/O & W/DYE: CPT

## 2023-12-30 PROCEDURE — 99285 EMERGENCY DEPT VISIT HI MDM: CPT

## 2023-12-30 RX ORDER — HEPARIN SODIUM (PORCINE) LOCK FLUSH IV SOLN 100 UNIT/ML 100 UNIT/ML
500 SOLUTION INTRAVENOUS PRN
Status: CANCELLED | OUTPATIENT
Start: 2024-01-03

## 2023-12-30 RX ORDER — DIPHENHYDRAMINE HYDROCHLORIDE 50 MG/ML
50 INJECTION INTRAMUSCULAR; INTRAVENOUS ONCE
Status: CANCELLED | OUTPATIENT
Start: 2024-01-03 | End: 2024-01-03

## 2023-12-30 RX ORDER — MORPHINE SULFATE 4 MG/ML
4 INJECTION, SOLUTION INTRAMUSCULAR; INTRAVENOUS EVERY 30 MIN PRN
Status: DISCONTINUED | OUTPATIENT
Start: 2023-12-30 | End: 2023-12-30 | Stop reason: HOSPADM

## 2023-12-30 RX ORDER — PALONOSETRON 0.05 MG/ML
0.25 INJECTION, SOLUTION INTRAVENOUS ONCE
Status: CANCELLED | OUTPATIENT
Start: 2024-01-03 | End: 2024-01-03

## 2023-12-30 RX ORDER — MORPHINE SULFATE 100 MG/5ML
10 SOLUTION ORAL EVERY 4 HOURS PRN
Qty: 21 ML | Refills: 0 | Status: SHIPPED | OUTPATIENT
Start: 2023-12-30 | End: 2023-12-31

## 2023-12-30 RX ORDER — MEPERIDINE HYDROCHLORIDE 50 MG/ML
12.5 INJECTION INTRAMUSCULAR; INTRAVENOUS; SUBCUTANEOUS PRN
Status: CANCELLED | OUTPATIENT
Start: 2023-12-30

## 2023-12-30 RX ORDER — ALBUTEROL SULFATE 90 UG/1
4 AEROSOL, METERED RESPIRATORY (INHALATION) PRN
Status: CANCELLED | OUTPATIENT
Start: 2023-12-30

## 2023-12-30 RX ORDER — FAMOTIDINE 10 MG/ML
20 INJECTION, SOLUTION INTRAVENOUS
Status: CANCELLED | OUTPATIENT
Start: 2024-01-03

## 2023-12-30 RX ORDER — FAMOTIDINE 10 MG/ML
20 INJECTION, SOLUTION INTRAVENOUS ONCE
Status: CANCELLED | OUTPATIENT
Start: 2024-01-03 | End: 2024-01-03

## 2023-12-30 RX ORDER — SODIUM CHLORIDE 0.9 % (FLUSH) 0.9 %
5-40 SYRINGE (ML) INJECTION PRN
Status: CANCELLED | OUTPATIENT
Start: 2024-01-03

## 2023-12-30 RX ORDER — ONDANSETRON 2 MG/ML
8 INJECTION INTRAMUSCULAR; INTRAVENOUS
Status: CANCELLED | OUTPATIENT
Start: 2024-01-03

## 2023-12-30 RX ORDER — SODIUM CHLORIDE 0.9 % (FLUSH) 0.9 %
5-40 SYRINGE (ML) INJECTION PRN
Status: CANCELLED | OUTPATIENT
Start: 2023-12-30

## 2023-12-30 RX ORDER — ALBUTEROL SULFATE 90 UG/1
4 AEROSOL, METERED RESPIRATORY (INHALATION) PRN
Status: CANCELLED | OUTPATIENT
Start: 2024-01-03

## 2023-12-30 RX ORDER — PALONOSETRON 0.05 MG/ML
0.25 INJECTION, SOLUTION INTRAVENOUS ONCE
Status: CANCELLED | OUTPATIENT
Start: 2023-12-30 | End: 2023-12-30

## 2023-12-30 RX ORDER — EPINEPHRINE 1 MG/ML
0.3 INJECTION, SOLUTION, CONCENTRATE INTRAVENOUS PRN
Status: CANCELLED | OUTPATIENT
Start: 2024-01-03

## 2023-12-30 RX ORDER — SODIUM CHLORIDE 9 MG/ML
INJECTION, SOLUTION INTRAVENOUS CONTINUOUS
Status: CANCELLED | OUTPATIENT
Start: 2023-12-30

## 2023-12-30 RX ORDER — FAMOTIDINE 10 MG/ML
20 INJECTION, SOLUTION INTRAVENOUS
Status: CANCELLED | OUTPATIENT
Start: 2023-12-30

## 2023-12-30 RX ORDER — ACETAMINOPHEN 325 MG/1
650 TABLET ORAL
Status: CANCELLED | OUTPATIENT
Start: 2023-12-30

## 2023-12-30 RX ORDER — EPINEPHRINE 1 MG/ML
0.3 INJECTION, SOLUTION, CONCENTRATE INTRAVENOUS PRN
Status: CANCELLED | OUTPATIENT
Start: 2023-12-30

## 2023-12-30 RX ORDER — HEPARIN SODIUM (PORCINE) LOCK FLUSH IV SOLN 100 UNIT/ML 100 UNIT/ML
500 SOLUTION INTRAVENOUS PRN
Status: CANCELLED | OUTPATIENT
Start: 2023-12-30

## 2023-12-30 RX ORDER — SODIUM CHLORIDE 9 MG/ML
INJECTION, SOLUTION INTRAVENOUS CONTINUOUS
Status: CANCELLED | OUTPATIENT
Start: 2024-01-03

## 2023-12-30 RX ORDER — DIPHENHYDRAMINE HYDROCHLORIDE 50 MG/ML
50 INJECTION INTRAMUSCULAR; INTRAVENOUS ONCE
Status: CANCELLED | OUTPATIENT
Start: 2023-12-30 | End: 2023-12-30

## 2023-12-30 RX ORDER — ACETAMINOPHEN 325 MG/1
650 TABLET ORAL
Status: CANCELLED | OUTPATIENT
Start: 2024-01-03

## 2023-12-30 RX ORDER — SODIUM CHLORIDE 9 MG/ML
5-250 INJECTION, SOLUTION INTRAVENOUS PRN
Status: CANCELLED | OUTPATIENT
Start: 2024-01-03

## 2023-12-30 RX ORDER — FAMOTIDINE 10 MG/ML
20 INJECTION, SOLUTION INTRAVENOUS ONCE
Status: CANCELLED | OUTPATIENT
Start: 2023-12-30 | End: 2023-12-30

## 2023-12-30 RX ORDER — DIPHENHYDRAMINE HYDROCHLORIDE 50 MG/ML
50 INJECTION INTRAMUSCULAR; INTRAVENOUS
Status: CANCELLED | OUTPATIENT
Start: 2024-01-03

## 2023-12-30 RX ORDER — DIPHENHYDRAMINE HYDROCHLORIDE 50 MG/ML
50 INJECTION INTRAMUSCULAR; INTRAVENOUS
Status: CANCELLED | OUTPATIENT
Start: 2023-12-30

## 2023-12-30 RX ORDER — MEPERIDINE HYDROCHLORIDE 50 MG/ML
12.5 INJECTION INTRAMUSCULAR; INTRAVENOUS; SUBCUTANEOUS PRN
Status: CANCELLED | OUTPATIENT
Start: 2024-01-03

## 2023-12-30 RX ORDER — SODIUM CHLORIDE 9 MG/ML
5-250 INJECTION, SOLUTION INTRAVENOUS PRN
Status: CANCELLED | OUTPATIENT
Start: 2023-12-30

## 2023-12-30 RX ORDER — ONDANSETRON 2 MG/ML
8 INJECTION INTRAMUSCULAR; INTRAVENOUS
Status: CANCELLED | OUTPATIENT
Start: 2023-12-30

## 2023-12-30 RX ADMIN — MORPHINE SULFATE 4 MG: 4 INJECTION, SOLUTION INTRAMUSCULAR; INTRAVENOUS at 20:40

## 2023-12-30 RX ADMIN — MORPHINE SULFATE 4 MG: 4 INJECTION, SOLUTION INTRAMUSCULAR; INTRAVENOUS at 19:02

## 2023-12-30 RX ADMIN — IOPAMIDOL 75 ML: 755 INJECTION, SOLUTION INTRAVENOUS at 18:56

## 2023-12-30 ASSESSMENT — LIFESTYLE VARIABLES
HOW MANY STANDARD DRINKS CONTAINING ALCOHOL DO YOU HAVE ON A TYPICAL DAY: PATIENT DOES NOT DRINK
HOW OFTEN DO YOU HAVE A DRINK CONTAINING ALCOHOL: NEVER

## 2023-12-30 ASSESSMENT — PAIN SCALES - GENERAL
PAINLEVEL_OUTOF10: 10
PAINLEVEL_OUTOF10: 7

## 2023-12-30 ASSESSMENT — PAIN DESCRIPTION - ORIENTATION: ORIENTATION: LEFT

## 2023-12-30 ASSESSMENT — PAIN - FUNCTIONAL ASSESSMENT: PAIN_FUNCTIONAL_ASSESSMENT: NONE - DENIES PAIN

## 2023-12-31 RX ORDER — MORPHINE SULFATE 100 MG/5ML
10 SOLUTION ORAL EVERY 4 HOURS PRN
Qty: 21 ML | Refills: 0 | Status: ON HOLD | OUTPATIENT
Start: 2023-12-31 | End: 2024-01-07

## 2023-12-31 NOTE — ED PROVIDER NOTES
posterior fossa there is fullness and enhancement in   the left extra-axial space which becomes thicker in the region of the upper   cervical spine. There is likely epidural spread of tumor.   4. Left mastoid effusion.      RECOMMENDATIONS:   Brain and cervical spine MRI with contrast may offer more detailed evaluation   of the extent of metastatic disease.             Procedures:  None      Chart review shows recent radiographs:  CT HEAD W WO CONTRAST    Result Date: 12/30/2023  EXAMINATION: CT OF THE HEAD WITH AND WITHOUT CONTRAST  12/30/2023 6:18 pm TECHNIQUE: CT of the head/brain was performed without and with the administration of intravenous contrast. Multiplanar reformatted images are provided for review. Automated exposure control, iterative reconstruction, and/or weight based adjustment of the mA/kV was utilized to reduce the radiation dose to as low as reasonably achievable. COMPARISON: 10/11/2023 head CT, MR brain from 10/31/2023 HISTORY: ORDERING SYSTEM PROVIDED HISTORY: headache, history of malignancy TECHNOLOGIST PROVIDED HISTORY: Reason for exam:->headache, history of malignancy Additional Contrast?->1 Reason for Exam: headache, history of malignancy FINDINGS: BRAIN/VENTRICLES: There is no acute intracranial hemorrhage, mass effect or midline shift.  No abnormal extra-axial fluid collection.  The gray-white differentiation is maintained without evidence of an acute infarct.  There is no evidence of hydrocephalus. Following administration of IV contrast there is no evidence of enhancing brain lesion to suggest intra-axial metastatic disease.  In the region of the posterior fossa there is fullness and enhancement in the left extra-axial space which becomes thicker in the region of the upper cervical spine.  There is likely epidural spread of tumor. ORBITS: The visualized portion of the orbits demonstrate no acute abnormality. SINUSES: The visualized paranasal sinuses demonstrate no acute abnormality.

## 2024-01-02 ENCOUNTER — HOSPITAL ENCOUNTER (INPATIENT)
Age: 79
LOS: 14 days | DRG: 177 | End: 2024-01-16
Attending: EMERGENCY MEDICINE | Admitting: STUDENT IN AN ORGANIZED HEALTH CARE EDUCATION/TRAINING PROGRAM
Payer: OTHER GOVERNMENT

## 2024-01-02 ENCOUNTER — APPOINTMENT (OUTPATIENT)
Dept: GENERAL RADIOLOGY | Age: 79
DRG: 177 | End: 2024-01-02
Attending: EMERGENCY MEDICINE
Payer: OTHER GOVERNMENT

## 2024-01-02 DIAGNOSIS — R65.10 SIRS (SYSTEMIC INFLAMMATORY RESPONSE SYNDROME) (HCC): Primary | ICD-10-CM

## 2024-01-02 DIAGNOSIS — I48.3 TYPICAL ATRIAL FLUTTER (HCC): ICD-10-CM

## 2024-01-02 DIAGNOSIS — J44.1 COPD EXACERBATION (HCC): ICD-10-CM

## 2024-01-02 DIAGNOSIS — Z85.9 HISTORY OF CANCER: ICD-10-CM

## 2024-01-02 DIAGNOSIS — R53.83 OTHER FATIGUE: ICD-10-CM

## 2024-01-02 DIAGNOSIS — R06.00 DYSPNEA, UNSPECIFIED TYPE: ICD-10-CM

## 2024-01-02 DIAGNOSIS — R79.89 TROPONIN LEVEL ELEVATED: ICD-10-CM

## 2024-01-02 DIAGNOSIS — R60.9 EDEMA, UNSPECIFIED TYPE: ICD-10-CM

## 2024-01-02 PROBLEM — A41.9 SEPSIS (HCC): Status: ACTIVE | Noted: 2024-01-02

## 2024-01-02 LAB
ALBUMIN SERPL-MCNC: 3.9 GM/DL (ref 3.4–5)
ALP BLD-CCNC: 126 IU/L (ref 40–129)
ALT SERPL-CCNC: 10 U/L (ref 10–40)
ANION GAP SERPL CALCULATED.3IONS-SCNC: 11 MMOL/L (ref 7–16)
AST SERPL-CCNC: 11 IU/L (ref 15–37)
B PARAP IS1001 DNA NPH QL NAA+NON-PROBE: NOT DETECTED
B PERT.PT PRMT NPH QL NAA+NON-PROBE: NOT DETECTED
BASE EXCESS MIXED: 1.8 (ref 0–3)
BASE EXCESS MIXED: 4 (ref 0–3)
BASE EXCESS: 2 (ref 0–3)
BASOPHILS ABSOLUTE: 0 K/CU MM
BASOPHILS RELATIVE PERCENT: 0.2 % (ref 0–1)
BILIRUB SERPL-MCNC: 0.3 MG/DL (ref 0–1)
BUN SERPL-MCNC: 30 MG/DL (ref 6–23)
C PNEUM DNA NPH QL NAA+NON-PROBE: NOT DETECTED
CALCIUM SERPL-MCNC: 9.7 MG/DL (ref 8.3–10.6)
CHLORIDE BLD-SCNC: 103 MMOL/L (ref 99–110)
CO2: 29 MMOL/L (ref 21–32)
COMMENT: ABNORMAL
COMMENT: ABNORMAL
CREAT SERPL-MCNC: 0.9 MG/DL (ref 0.9–1.3)
DIFFERENTIAL TYPE: ABNORMAL
EOSINOPHILS ABSOLUTE: 0.3 K/CU MM
EOSINOPHILS RELATIVE PERCENT: 1.7 % (ref 0–3)
FLUAV H1 2009 PAN RNA NPH NAA+NON-PROBE: NOT DETECTED
FLUAV H1 RNA NPH QL NAA+NON-PROBE: NOT DETECTED
FLUAV H3 RNA NPH QL NAA+NON-PROBE: NOT DETECTED
FLUAV RNA NPH QL NAA+NON-PROBE: NOT DETECTED
FLUBV RNA NPH QL NAA+NON-PROBE: NOT DETECTED
GFR SERPL CREATININE-BSD FRML MDRD: >60 ML/MIN/1.73M2
GLUCOSE SERPL-MCNC: 123 MG/DL (ref 70–99)
HADV DNA NPH QL NAA+NON-PROBE: NOT DETECTED
HCO3 VENOUS: 29.2 MMOL/L (ref 22–29)
HCO3 VENOUS: 31.1 MMOL/L (ref 22–29)
HCO3 VENOUS: 32.2 MMOL/L (ref 22–29)
HCOV 229E RNA NPH QL NAA+NON-PROBE: NOT DETECTED
HCOV HKU1 RNA NPH QL NAA+NON-PROBE: NOT DETECTED
HCOV NL63 RNA NPH QL NAA+NON-PROBE: NOT DETECTED
HCOV OC43 RNA NPH QL NAA+NON-PROBE: NOT DETECTED
HCT VFR BLD CALC: 40.4 % (ref 42–52)
HEMOGLOBIN: 12 GM/DL (ref 13.5–18)
HMPV RNA NPH QL NAA+NON-PROBE: NOT DETECTED
HPIV1 RNA NPH QL NAA+NON-PROBE: NOT DETECTED
HPIV2 RNA NPH QL NAA+NON-PROBE: NOT DETECTED
HPIV3 RNA NPH QL NAA+NON-PROBE: NOT DETECTED
HPIV4 RNA NPH QL NAA+NON-PROBE: NOT DETECTED
IMMATURE NEUTROPHIL %: 0.3 % (ref 0–0.43)
INFLUENZA A ANTIGEN: NOT DETECTED
INFLUENZA B ANTIGEN: NOT DETECTED
LACTATE: 1.3 MMOL/L (ref 0.5–1.9)
LYMPHOCYTES ABSOLUTE: 3.5 K/CU MM
LYMPHOCYTES RELATIVE PERCENT: 23.8 % (ref 24–44)
M PNEUMO DNA NPH QL NAA+NON-PROBE: NOT DETECTED
MCH RBC QN AUTO: 31.7 PG (ref 27–31)
MCHC RBC AUTO-ENTMCNC: 29.7 % (ref 32–36)
MCV RBC AUTO: 106.9 FL (ref 78–100)
MONOCYTES ABSOLUTE: 0.9 K/CU MM
MONOCYTES RELATIVE PERCENT: 5.8 % (ref 0–4)
NUCLEATED RBC %: 0 %
O2 SAT, VEN: 38.7 % (ref 50–70)
O2 SAT, VEN: 49 % (ref 50–70)
O2 SAT, VEN: 72.4 % (ref 50–70)
PCO2, VEN: 64 MMHG (ref 41–51)
PCO2, VEN: 71 MMHG (ref 41–51)
PCO2, VEN: 82 MMHG (ref 41–51)
PDW BLD-RTO: 14.3 % (ref 11.7–14.9)
PH VENOUS: 7.16 (ref 7.32–7.43)
PH VENOUS: 7.25 (ref 7.32–7.43)
PH VENOUS: 7.31 (ref 7.32–7.43)
PLATELET # BLD: 188 K/CU MM (ref 140–440)
PMV BLD AUTO: 10.2 FL (ref 7.5–11.1)
PO2, VEN: 33 MMHG (ref 28–48)
PO2, VEN: 35 MMHG (ref 28–48)
PO2, VEN: 41 MMHG (ref 28–48)
POTASSIUM SERPL-SCNC: 4.4 MMOL/L (ref 3.5–5.1)
PRO-BNP: 905.7 PG/ML
PROCALCITONIN SERPL-MCNC: 0.14 NG/ML
RBC # BLD: 3.78 M/CU MM (ref 4.6–6.2)
RSV RNA NPH QL NAA+NON-PROBE: NOT DETECTED
RV+EV RNA NPH QL NAA+NON-PROBE: NOT DETECTED
SARS-COV-2 RDRP RESP QL NAA+PROBE: NOT DETECTED
SARS-COV-2 RNA NPH QL NAA+NON-PROBE: NOT DETECTED
SEGMENTED NEUTROPHILS ABSOLUTE COUNT: 10 K/CU MM
SEGMENTED NEUTROPHILS RELATIVE PERCENT: 68.2 % (ref 36–66)
SODIUM BLD-SCNC: 143 MMOL/L (ref 135–145)
SOURCE: NORMAL
TOTAL IMMATURE NEUTOROPHIL: 0.05 K/CU MM
TOTAL NUCLEATED RBC: 0 K/CU MM
TOTAL PROTEIN: 6.7 GM/DL (ref 6.4–8.2)
TROPONIN, HIGH SENSITIVITY: 56 NG/L (ref 0–22)
TROPONIN, HIGH SENSITIVITY: 58 NG/L (ref 0–22)
WBC # BLD: 14.7 K/CU MM (ref 4–10.5)

## 2024-01-02 PROCEDURE — 87040 BLOOD CULTURE FOR BACTERIA: CPT

## 2024-01-02 PROCEDURE — 87635 SARS-COV-2 COVID-19 AMP PRB: CPT

## 2024-01-02 PROCEDURE — 6370000000 HC RX 637 (ALT 250 FOR IP): Performed by: EMERGENCY MEDICINE

## 2024-01-02 PROCEDURE — 94761 N-INVAS EAR/PLS OXIMETRY MLT: CPT

## 2024-01-02 PROCEDURE — 84484 ASSAY OF TROPONIN QUANT: CPT

## 2024-01-02 PROCEDURE — 93005 ELECTROCARDIOGRAM TRACING: CPT | Performed by: EMERGENCY MEDICINE

## 2024-01-02 PROCEDURE — 6370000000 HC RX 637 (ALT 250 FOR IP): Performed by: STUDENT IN AN ORGANIZED HEALTH CARE EDUCATION/TRAINING PROGRAM

## 2024-01-02 PROCEDURE — 0202U NFCT DS 22 TRGT SARS-COV-2: CPT

## 2024-01-02 PROCEDURE — 94640 AIRWAY INHALATION TREATMENT: CPT

## 2024-01-02 PROCEDURE — 84145 PROCALCITONIN (PCT): CPT

## 2024-01-02 PROCEDURE — 2580000003 HC RX 258: Performed by: STUDENT IN AN ORGANIZED HEALTH CARE EDUCATION/TRAINING PROGRAM

## 2024-01-02 PROCEDURE — 6360000002 HC RX W HCPCS: Performed by: EMERGENCY MEDICINE

## 2024-01-02 PROCEDURE — 99285 EMERGENCY DEPT VISIT HI MDM: CPT

## 2024-01-02 PROCEDURE — 5A09457 ASSISTANCE WITH RESPIRATORY VENTILATION, 24-96 CONSECUTIVE HOURS, CONTINUOUS POSITIVE AIRWAY PRESSURE: ICD-10-PCS | Performed by: STUDENT IN AN ORGANIZED HEALTH CARE EDUCATION/TRAINING PROGRAM

## 2024-01-02 PROCEDURE — 96375 TX/PRO/DX INJ NEW DRUG ADDON: CPT

## 2024-01-02 PROCEDURE — 85025 COMPLETE CBC W/AUTO DIFF WBC: CPT

## 2024-01-02 PROCEDURE — 2140000000 HC CCU INTERMEDIATE R&B

## 2024-01-02 PROCEDURE — 94664 DEMO&/EVAL PT USE INHALER: CPT

## 2024-01-02 PROCEDURE — 87502 INFLUENZA DNA AMP PROBE: CPT

## 2024-01-02 PROCEDURE — 87070 CULTURE OTHR SPECIMN AEROBIC: CPT

## 2024-01-02 PROCEDURE — 2700000000 HC OXYGEN THERAPY PER DAY

## 2024-01-02 PROCEDURE — 83880 ASSAY OF NATRIURETIC PEPTIDE: CPT

## 2024-01-02 PROCEDURE — 82805 BLOOD GASES W/O2 SATURATION: CPT

## 2024-01-02 PROCEDURE — 87205 SMEAR GRAM STAIN: CPT

## 2024-01-02 PROCEDURE — 94660 CPAP INITIATION&MGMT: CPT

## 2024-01-02 PROCEDURE — 80053 COMPREHEN METABOLIC PANEL: CPT

## 2024-01-02 PROCEDURE — 71045 X-RAY EXAM CHEST 1 VIEW: CPT

## 2024-01-02 PROCEDURE — 96365 THER/PROPH/DIAG IV INF INIT: CPT

## 2024-01-02 PROCEDURE — 83605 ASSAY OF LACTIC ACID: CPT

## 2024-01-02 PROCEDURE — 2580000003 HC RX 258: Performed by: EMERGENCY MEDICINE

## 2024-01-02 RX ORDER — BUTALBITAL, ACETAMINOPHEN AND CAFFEINE 50; 325; 40 MG/1; MG/1; MG/1
1 TABLET ORAL EVERY 4 HOURS PRN
Status: DISCONTINUED | OUTPATIENT
Start: 2024-01-02 | End: 2024-01-16 | Stop reason: HOSPADM

## 2024-01-02 RX ORDER — IPRATROPIUM BROMIDE AND ALBUTEROL SULFATE 2.5; .5 MG/3ML; MG/3ML
1 SOLUTION RESPIRATORY (INHALATION) ONCE
Status: COMPLETED | OUTPATIENT
Start: 2024-01-02 | End: 2024-01-02

## 2024-01-02 RX ORDER — ONDANSETRON 2 MG/ML
4 INJECTION INTRAMUSCULAR; INTRAVENOUS EVERY 6 HOURS PRN
Status: DISCONTINUED | OUTPATIENT
Start: 2024-01-02 | End: 2024-01-16 | Stop reason: HOSPADM

## 2024-01-02 RX ORDER — 0.9 % SODIUM CHLORIDE 0.9 %
1000 INTRAVENOUS SOLUTION INTRAVENOUS ONCE
Status: COMPLETED | OUTPATIENT
Start: 2024-01-02 | End: 2024-01-02

## 2024-01-02 RX ORDER — ONDANSETRON 4 MG/1
4 TABLET, ORALLY DISINTEGRATING ORAL EVERY 8 HOURS PRN
Status: DISCONTINUED | OUTPATIENT
Start: 2024-01-02 | End: 2024-01-16 | Stop reason: HOSPADM

## 2024-01-02 RX ORDER — SODIUM CHLORIDE 0.9 % (FLUSH) 0.9 %
5-40 SYRINGE (ML) INJECTION PRN
Status: DISCONTINUED | OUTPATIENT
Start: 2024-01-02 | End: 2024-01-16 | Stop reason: HOSPADM

## 2024-01-02 RX ORDER — BUDESONIDE AND FORMOTEROL FUMARATE DIHYDRATE 160; 4.5 UG/1; UG/1
2 AEROSOL RESPIRATORY (INHALATION)
Status: DISCONTINUED | OUTPATIENT
Start: 2024-01-02 | End: 2024-01-16 | Stop reason: HOSPADM

## 2024-01-02 RX ORDER — METHYLPREDNISOLONE SODIUM SUCCINATE 125 MG/2ML
125 INJECTION, POWDER, LYOPHILIZED, FOR SOLUTION INTRAMUSCULAR; INTRAVENOUS ONCE
Status: COMPLETED | OUTPATIENT
Start: 2024-01-02 | End: 2024-01-02

## 2024-01-02 RX ORDER — DILTIAZEM HYDROCHLORIDE 240 MG/1
240 CAPSULE, COATED, EXTENDED RELEASE ORAL DAILY
Status: DISCONTINUED | OUTPATIENT
Start: 2024-01-02 | End: 2024-01-03

## 2024-01-02 RX ORDER — POTASSIUM CHLORIDE 20 MEQ/1
40 TABLET, EXTENDED RELEASE ORAL PRN
Status: DISCONTINUED | OUTPATIENT
Start: 2024-01-02 | End: 2024-01-05

## 2024-01-02 RX ORDER — 0.9 % SODIUM CHLORIDE 0.9 %
1000 INTRAVENOUS SOLUTION INTRAVENOUS ONCE
Status: DISCONTINUED | OUTPATIENT
Start: 2024-01-02 | End: 2024-01-06

## 2024-01-02 RX ORDER — DIPHENHYDRAMINE HYDROCHLORIDE 50 MG/ML
25 INJECTION INTRAMUSCULAR; INTRAVENOUS ONCE
Status: COMPLETED | OUTPATIENT
Start: 2024-01-02 | End: 2024-01-02

## 2024-01-02 RX ORDER — ASPIRIN 81 MG/1
324 TABLET, CHEWABLE ORAL ONCE
Status: COMPLETED | OUTPATIENT
Start: 2024-01-02 | End: 2024-01-02

## 2024-01-02 RX ORDER — SODIUM CHLORIDE 0.9 % (FLUSH) 0.9 %
5-40 SYRINGE (ML) INJECTION EVERY 12 HOURS SCHEDULED
Status: DISCONTINUED | OUTPATIENT
Start: 2024-01-02 | End: 2024-01-16 | Stop reason: HOSPADM

## 2024-01-02 RX ORDER — POLYETHYLENE GLYCOL 3350 17 G/17G
17 POWDER, FOR SOLUTION ORAL DAILY PRN
Status: DISCONTINUED | OUTPATIENT
Start: 2024-01-02 | End: 2024-01-16 | Stop reason: HOSPADM

## 2024-01-02 RX ORDER — ESCITALOPRAM OXALATE 10 MG/1
20 TABLET ORAL DAILY
Status: DISCONTINUED | OUTPATIENT
Start: 2024-01-03 | End: 2024-01-16 | Stop reason: HOSPADM

## 2024-01-02 RX ORDER — GLUCAGON 1 MG/ML
1 KIT INJECTION PRN
Status: DISCONTINUED | OUTPATIENT
Start: 2024-01-02 | End: 2024-01-16 | Stop reason: HOSPADM

## 2024-01-02 RX ORDER — ACETAMINOPHEN 325 MG/1
650 TABLET ORAL EVERY 6 HOURS PRN
Status: DISCONTINUED | OUTPATIENT
Start: 2024-01-02 | End: 2024-01-16 | Stop reason: HOSPADM

## 2024-01-02 RX ORDER — PANTOPRAZOLE SODIUM 40 MG/1
40 TABLET, DELAYED RELEASE ORAL
Status: DISCONTINUED | OUTPATIENT
Start: 2024-01-03 | End: 2024-01-03

## 2024-01-02 RX ORDER — BUSPIRONE HYDROCHLORIDE 15 MG/1
30 TABLET ORAL DAILY
Status: DISCONTINUED | OUTPATIENT
Start: 2024-01-03 | End: 2024-01-16 | Stop reason: HOSPADM

## 2024-01-02 RX ORDER — SODIUM CHLORIDE 450 MG/100ML
INJECTION, SOLUTION INTRAVENOUS CONTINUOUS
Status: DISPENSED | OUTPATIENT
Start: 2024-01-02 | End: 2024-01-03

## 2024-01-02 RX ORDER — DEXTROSE MONOHYDRATE 100 MG/ML
INJECTION, SOLUTION INTRAVENOUS CONTINUOUS PRN
Status: DISCONTINUED | OUTPATIENT
Start: 2024-01-02 | End: 2024-01-16 | Stop reason: HOSPADM

## 2024-01-02 RX ORDER — ATORVASTATIN CALCIUM 40 MG/1
40 TABLET, FILM COATED ORAL DAILY
Status: DISCONTINUED | OUTPATIENT
Start: 2024-01-03 | End: 2024-01-16 | Stop reason: HOSPADM

## 2024-01-02 RX ORDER — IPRATROPIUM BROMIDE AND ALBUTEROL SULFATE 2.5; .5 MG/3ML; MG/3ML
1 SOLUTION RESPIRATORY (INHALATION)
Status: DISCONTINUED | OUTPATIENT
Start: 2024-01-02 | End: 2024-01-08

## 2024-01-02 RX ORDER — POTASSIUM CHLORIDE 7.45 MG/ML
10 INJECTION INTRAVENOUS PRN
Status: DISCONTINUED | OUTPATIENT
Start: 2024-01-02 | End: 2024-01-05

## 2024-01-02 RX ORDER — SODIUM CHLORIDE 9 MG/ML
INJECTION, SOLUTION INTRAVENOUS PRN
Status: DISCONTINUED | OUTPATIENT
Start: 2024-01-02 | End: 2024-01-16 | Stop reason: HOSPADM

## 2024-01-02 RX ORDER — ACETYLCYSTEINE 200 MG/ML
600 SOLUTION ORAL; RESPIRATORY (INHALATION)
Status: DISCONTINUED | OUTPATIENT
Start: 2024-01-02 | End: 2024-01-13

## 2024-01-02 RX ORDER — ACETAMINOPHEN 500 MG
1000 TABLET ORAL ONCE
Status: DISCONTINUED | OUTPATIENT
Start: 2024-01-02 | End: 2024-01-16 | Stop reason: HOSPADM

## 2024-01-02 RX ORDER — ACETAMINOPHEN 650 MG/1
650 SUPPOSITORY RECTAL EVERY 6 HOURS PRN
Status: DISCONTINUED | OUTPATIENT
Start: 2024-01-02 | End: 2024-01-16 | Stop reason: HOSPADM

## 2024-01-02 RX ORDER — TIZANIDINE 2 MG/1
2 TABLET ORAL NIGHTLY
Status: DISCONTINUED | OUTPATIENT
Start: 2024-01-02 | End: 2024-01-16 | Stop reason: HOSPADM

## 2024-01-02 RX ORDER — MAGNESIUM SULFATE IN WATER 40 MG/ML
2000 INJECTION, SOLUTION INTRAVENOUS PRN
Status: DISCONTINUED | OUTPATIENT
Start: 2024-01-02 | End: 2024-01-05

## 2024-01-02 RX ADMIN — CEFEPIME 2000 MG: 2 INJECTION, POWDER, FOR SOLUTION INTRAVENOUS at 17:09

## 2024-01-02 RX ADMIN — METHYLPREDNISOLONE SODIUM SUCCINATE 125 MG: 125 INJECTION, POWDER, LYOPHILIZED, FOR SOLUTION INTRAMUSCULAR; INTRAVENOUS at 17:05

## 2024-01-02 RX ADMIN — DIPHENHYDRAMINE HYDROCHLORIDE 25 MG: 50 INJECTION, SOLUTION INTRAMUSCULAR; INTRAVENOUS at 16:57

## 2024-01-02 RX ADMIN — TIZANIDINE 2 MG: 2 TABLET ORAL at 21:49

## 2024-01-02 RX ADMIN — IPRATROPIUM BROMIDE AND ALBUTEROL SULFATE 1 DOSE: 2.5; .5 SOLUTION RESPIRATORY (INHALATION) at 17:10

## 2024-01-02 RX ADMIN — VANCOMYCIN HYDROCHLORIDE 1750 MG: 5 INJECTION, POWDER, LYOPHILIZED, FOR SOLUTION INTRAVENOUS at 18:41

## 2024-01-02 RX ADMIN — IPRATROPIUM BROMIDE AND ALBUTEROL SULFATE 1 DOSE: 2.5; .5 SOLUTION RESPIRATORY (INHALATION) at 17:12

## 2024-01-02 RX ADMIN — SODIUM CHLORIDE, PRESERVATIVE FREE 10 ML: 5 INJECTION INTRAVENOUS at 22:01

## 2024-01-02 RX ADMIN — SODIUM CHLORIDE 1000 ML: 9 INJECTION, SOLUTION INTRAVENOUS at 17:07

## 2024-01-02 RX ADMIN — ASPIRIN 324 MG: 81 TABLET, CHEWABLE ORAL at 19:03

## 2024-01-02 RX ADMIN — IPRATROPIUM BROMIDE AND ALBUTEROL SULFATE 1 DOSE: 2.5; .5 SOLUTION RESPIRATORY (INHALATION) at 17:11

## 2024-01-02 ASSESSMENT — PAIN SCALES - GENERAL: PAINLEVEL_OUTOF10: 10

## 2024-01-02 ASSESSMENT — PAIN - FUNCTIONAL ASSESSMENT: PAIN_FUNCTIONAL_ASSESSMENT: 0-10

## 2024-01-02 ASSESSMENT — PAIN DESCRIPTION - LOCATION: LOCATION: HEAD

## 2024-01-02 NOTE — H&P
V2.0  History and Physical      Name:  Mateo Street /Age/Sex: 1945  (78 y.o. male)   MRN & CSN:  [id number] & [id number] Encounter Date/Time: 2024 5:47 PM EST   Location:  [address] [place] [address]- PCP: Salvador Riley MD       Hospital Day: 1      History of Present Illness:     Chief Complaint: SOB    Mateo Street is a 78 y.o. male with PMH of metastatic non-small cell carcinoma, COPD, atrial flutter who presents with days of SOB, fatigue and poor oral intake. Denies any URI symptoms, denies worsening of cough. Pt on home 3L NC. Pt has been struggling with swallowing pills as well. He is on liquid diet but his oral intake has decreased lately.     At ED, pt was afebrile, hemodynamically stable 's, 's; on 3-4L NC Labs Na 143, K 4.4, Cr 0.9, , troponin 56, WBC 14.7, Hgb 12.0. VBG pH 7.25, pCo2 71. CXR no acute changes. Covid-19, rapid flu all negative. Pt received breathing tx, steroids, vanc+cefepime, 2L IVF NS. Discussed with ED, will admit pt to step down  for sepsis  workup and management.   Assessment and Plan:   Acute hypoxic hypercapnic respiratory failure  2/2 aspiration pneumonitis and suspected COPD exacerbation. Hx of COPD, and lung cancer.  pH 7.25, pCO2 71.   - BIPAP  - steroids, breathing tx  - supportive therapy  - abx as below  - resume COPD home meds    SIRS 2/4 vs sepsis 2/2 bacterial pneumonia. P/w tachycardia, leukocytosis could be 2/2 dehydration. CXR no acute changes.   - will continue IVF  - continue vanc+Cefepime  - order strep, legionella, resp cx  - f/u blood cxX2    Atrial flutter. On home diltiazem and Xarelto.  - resume Xarelto  - hold dilt plan to resume once sepsis picture is ruled out    Metastatic lung cancer to brain w enlarging mediastinal adenopathy, R paratracheal/hilar mass measuring 6.5 x 6.2 cm with possible invasion of tracheal lesion   Plan for palliative radiation.  On chemo therapy. Follows Dr. Sprague.   Order SLP given concern for worsening

## 2024-01-02 NOTE — CARE COORDINATION
CM consulted for d/c planning. CM went and spoke with pt. Pt is from home, his son checks on him, he has insurance and PCP. Pt has not had any HC or been to rehab before. Pt's son Rasta states he reached out to [facility] already. Pt has [facility] benefits. Pt starts radiation next week. Pt is agreeable to go to skilled rehab at this time. Would like referral made to [facility].     Updated Dr. Marie. Referral left for Mindy at [facility]. Handoff note placed and [facility] added to destinations. Please keep son Rasta up to date.

## 2024-01-02 NOTE — ED PROVIDER NOTES
[facility]      TRIAGE CHIEF COMPLAINT:   Fatigue and Shortness of Breath      [place]:  Mateo Street is a 78 y.o. male that presents with complaint of fatigue malaise cough shortness of breath headache.  Patient was here couple days ago for headache he has a history of headaches severe headaches in the past.  He states morphine given the other day did not help.  He presents back by EMS today with complaint of increased shortness of breath fatigue malaise he has had cough congestion.  He wears oxygen all the time at home 3 L.  He does have COPD.  Also history of esophageal cancer.  He does follow with oncology here.  Denies other questions or concerns just states generalized weakness cannot walk, feels bad denies other questions or concerns no chest pain or abdominal pain..    REVIEW OF SYSTEMS:  At least 10 systems reviewed and otherwise acutely negative except as in the [place].    Review of Systems   Constitutional:  Positive for activity change, appetite change and fatigue.   HENT:  Positive for congestion.    Eyes: Negative.    Respiratory:  Positive for cough and shortness of breath.    Cardiovascular: Negative.    Gastrointestinal: Negative.    Endocrine: Negative.    Genitourinary: Negative.    Musculoskeletal:  Positive for arthralgias and myalgias.   Skin: Negative.    Allergic/Immunologic: Negative.    Neurological:  Positive for weakness and headaches.   Hematological: Negative.    Psychiatric/Behavioral: Negative.     All other systems reviewed and are negative.      Past Medical History:   Diagnosis Date    Alcohol abuse     Atrial flutter (HCC)     10/2006-s/p atrial ablation    COPD (chronic obstructive pulmonary disease) (HCC)     Diabetes mellitus (HCC)     H/O 24 hour EKG monitoring 12/06, 11/06 12/06. 24 hr Holter monitor. Normal    H/O cardiovascular stress test 11/10, 12/07    11/10. EF 64%. Normal myocardial study    H/O echocardiogram 11/12/2013, 11/09 11/13

## 2024-01-03 LAB
ANION GAP SERPL CALCULATED.3IONS-SCNC: 11 MMOL/L (ref 7–16)
BASE EXCESS MIXED: 0.8 (ref 0–3)
BASOPHILS ABSOLUTE: 0 K/CU MM
BASOPHILS RELATIVE PERCENT: 0.1 % (ref 0–1)
BUN SERPL-MCNC: 32 MG/DL (ref 6–23)
CALCIUM SERPL-MCNC: 8.8 MG/DL (ref 8.3–10.6)
CHLORIDE BLD-SCNC: 107 MMOL/L (ref 99–110)
CO2: 25 MMOL/L (ref 21–32)
COMMENT: ABNORMAL
CREAT SERPL-MCNC: 0.8 MG/DL (ref 0.9–1.3)
DIFFERENTIAL TYPE: ABNORMAL
EOSINOPHILS ABSOLUTE: 0 K/CU MM
EOSINOPHILS RELATIVE PERCENT: 0.1 % (ref 0–3)
GFR SERPL CREATININE-BSD FRML MDRD: >60 ML/MIN/1.73M2
GLUCOSE BLD-MCNC: 104 MG/DL (ref 70–99)
GLUCOSE BLD-MCNC: 123 MG/DL (ref 70–99)
GLUCOSE BLD-MCNC: 80 MG/DL (ref 70–99)
GLUCOSE SERPL-MCNC: 110 MG/DL (ref 70–99)
HCO3 VENOUS: 29.1 MMOL/L (ref 22–29)
HCT VFR BLD CALC: 39 % (ref 42–52)
HEMOGLOBIN: 11.4 GM/DL (ref 13.5–18)
IMMATURE NEUTROPHIL %: 0.4 % (ref 0–0.43)
LYMPHOCYTES ABSOLUTE: 2.7 K/CU MM
LYMPHOCYTES RELATIVE PERCENT: 19.3 % (ref 24–44)
MCH RBC QN AUTO: 31.8 PG (ref 27–31)
MCHC RBC AUTO-ENTMCNC: 29.2 % (ref 32–36)
MCV RBC AUTO: 108.6 FL (ref 78–100)
MONOCYTES ABSOLUTE: 0.3 K/CU MM
MONOCYTES RELATIVE PERCENT: 2.4 % (ref 0–4)
NUCLEATED RBC %: 0 %
O2 SAT, VEN: 51.6 % (ref 50–70)
PCO2, VEN: 62 MMHG (ref 41–51)
PDW BLD-RTO: 14.3 % (ref 11.7–14.9)
PH VENOUS: 7.28 (ref 7.32–7.43)
PLATELET # BLD: 176 K/CU MM (ref 140–440)
PMV BLD AUTO: 10.2 FL (ref 7.5–11.1)
PO2, VEN: 35 MMHG (ref 28–48)
POTASSIUM SERPL-SCNC: 5.2 MMOL/L (ref 3.5–5.1)
RBC # BLD: 3.59 M/CU MM (ref 4.6–6.2)
SEGMENTED NEUTROPHILS ABSOLUTE COUNT: 11 K/CU MM
SEGMENTED NEUTROPHILS RELATIVE PERCENT: 77.7 % (ref 36–66)
SODIUM BLD-SCNC: 143 MMOL/L (ref 135–145)
TOTAL IMMATURE NEUTOROPHIL: 0.06 K/CU MM
TOTAL NUCLEATED RBC: 0 K/CU MM
WBC # BLD: 14.2 K/CU MM (ref 4–10.5)

## 2024-01-03 PROCEDURE — 6360000002 HC RX W HCPCS: Performed by: INTERNAL MEDICINE

## 2024-01-03 PROCEDURE — 2140000000 HC CCU INTERMEDIATE R&B

## 2024-01-03 PROCEDURE — 2700000000 HC OXYGEN THERAPY PER DAY

## 2024-01-03 PROCEDURE — 6370000000 HC RX 637 (ALT 250 FOR IP)

## 2024-01-03 PROCEDURE — 6360000002 HC RX W HCPCS

## 2024-01-03 PROCEDURE — 80048 BASIC METABOLIC PNL TOTAL CA: CPT

## 2024-01-03 PROCEDURE — 82962 GLUCOSE BLOOD TEST: CPT

## 2024-01-03 PROCEDURE — 2500000003 HC RX 250 WO HCPCS: Performed by: STUDENT IN AN ORGANIZED HEALTH CARE EDUCATION/TRAINING PROGRAM

## 2024-01-03 PROCEDURE — 6370000000 HC RX 637 (ALT 250 FOR IP): Performed by: STUDENT IN AN ORGANIZED HEALTH CARE EDUCATION/TRAINING PROGRAM

## 2024-01-03 PROCEDURE — 76937 US GUIDE VASCULAR ACCESS: CPT

## 2024-01-03 PROCEDURE — 94640 AIRWAY INHALATION TREATMENT: CPT

## 2024-01-03 PROCEDURE — 82805 BLOOD GASES W/O2 SATURATION: CPT

## 2024-01-03 PROCEDURE — 87070 CULTURE OTHR SPECIMN AEROBIC: CPT

## 2024-01-03 PROCEDURE — 2580000003 HC RX 258

## 2024-01-03 PROCEDURE — 36415 COLL VENOUS BLD VENIPUNCTURE: CPT

## 2024-01-03 PROCEDURE — C9113 INJ PANTOPRAZOLE SODIUM, VIA: HCPCS | Performed by: STUDENT IN AN ORGANIZED HEALTH CARE EDUCATION/TRAINING PROGRAM

## 2024-01-03 PROCEDURE — 92610 EVALUATE SWALLOWING FUNCTION: CPT

## 2024-01-03 PROCEDURE — 87641 MR-STAPH DNA AMP PROBE: CPT

## 2024-01-03 PROCEDURE — 6360000002 HC RX W HCPCS: Performed by: STUDENT IN AN ORGANIZED HEALTH CARE EDUCATION/TRAINING PROGRAM

## 2024-01-03 PROCEDURE — 2580000003 HC RX 258: Performed by: INTERNAL MEDICINE

## 2024-01-03 PROCEDURE — 87205 SMEAR GRAM STAIN: CPT

## 2024-01-03 PROCEDURE — 94761 N-INVAS EAR/PLS OXIMETRY MLT: CPT

## 2024-01-03 PROCEDURE — 94660 CPAP INITIATION&MGMT: CPT

## 2024-01-03 PROCEDURE — 85025 COMPLETE CBC W/AUTO DIFF WBC: CPT

## 2024-01-03 PROCEDURE — 2580000003 HC RX 258: Performed by: STUDENT IN AN ORGANIZED HEALTH CARE EDUCATION/TRAINING PROGRAM

## 2024-01-03 RX ORDER — DILTIAZEM HYDROCHLORIDE 240 MG/1
240 CAPSULE, COATED, EXTENDED RELEASE ORAL DAILY
Status: DISCONTINUED | OUTPATIENT
Start: 2024-01-03 | End: 2024-01-06

## 2024-01-03 RX ORDER — METOPROLOL TARTRATE 1 MG/ML
5 INJECTION, SOLUTION INTRAVENOUS EVERY 6 HOURS
Status: DISCONTINUED | OUTPATIENT
Start: 2024-01-03 | End: 2024-01-05

## 2024-01-03 RX ORDER — PANTOPRAZOLE SODIUM 40 MG/10ML
40 INJECTION, POWDER, LYOPHILIZED, FOR SOLUTION INTRAVENOUS DAILY
Status: DISCONTINUED | OUTPATIENT
Start: 2024-01-03 | End: 2024-01-06

## 2024-01-03 RX ORDER — ENOXAPARIN SODIUM 100 MG/ML
1 INJECTION SUBCUTANEOUS 2 TIMES DAILY
Status: DISCONTINUED | OUTPATIENT
Start: 2024-01-03 | End: 2024-01-06

## 2024-01-03 RX ORDER — DIPHENHYDRAMINE HYDROCHLORIDE 50 MG/ML
25 INJECTION INTRAMUSCULAR; INTRAVENOUS EVERY 6 HOURS PRN
Status: COMPLETED | OUTPATIENT
Start: 2024-01-03 | End: 2024-01-05

## 2024-01-03 RX ORDER — PROCHLORPERAZINE EDISYLATE 5 MG/ML
10 INJECTION INTRAMUSCULAR; INTRAVENOUS ONCE
Status: COMPLETED | OUTPATIENT
Start: 2024-01-03 | End: 2024-01-03

## 2024-01-03 RX ADMIN — SODIUM CHLORIDE, PRESERVATIVE FREE 10 ML: 5 INJECTION INTRAVENOUS at 09:04

## 2024-01-03 RX ADMIN — CEFEPIME 2000 MG: 2 INJECTION, POWDER, FOR SOLUTION INTRAVENOUS at 16:57

## 2024-01-03 RX ADMIN — PANTOPRAZOLE SODIUM 40 MG: 40 INJECTION, POWDER, FOR SOLUTION INTRAVENOUS at 13:25

## 2024-01-03 RX ADMIN — SODIUM CHLORIDE, PRESERVATIVE FREE 10 ML: 5 INJECTION INTRAVENOUS at 21:58

## 2024-01-03 RX ADMIN — BUSPIRONE HYDROCHLORIDE 30 MG: 15 TABLET ORAL at 09:03

## 2024-01-03 RX ADMIN — METHYLPREDNISOLONE SODIUM SUCCINATE 40 MG: 40 INJECTION, POWDER, LYOPHILIZED, FOR SOLUTION INTRAMUSCULAR; INTRAVENOUS at 21:58

## 2024-01-03 RX ADMIN — METHYLPREDNISOLONE SODIUM SUCCINATE 40 MG: 40 INJECTION, POWDER, LYOPHILIZED, FOR SOLUTION INTRAMUSCULAR; INTRAVENOUS at 06:24

## 2024-01-03 RX ADMIN — IPRATROPIUM BROMIDE AND ALBUTEROL SULFATE 1 DOSE: 2.5; .5 SOLUTION RESPIRATORY (INHALATION) at 15:11

## 2024-01-03 RX ADMIN — ATORVASTATIN CALCIUM 40 MG: 40 TABLET, FILM COATED ORAL at 09:03

## 2024-01-03 RX ADMIN — VANCOMYCIN HYDROCHLORIDE 750 MG: 750 INJECTION, POWDER, LYOPHILIZED, FOR SOLUTION INTRAVENOUS at 18:42

## 2024-01-03 RX ADMIN — PROCHLORPERAZINE EDISYLATE 10 MG: 5 INJECTION INTRAMUSCULAR; INTRAVENOUS at 21:58

## 2024-01-03 RX ADMIN — DIPHENHYDRAMINE HYDROCHLORIDE 25 MG: 50 INJECTION, SOLUTION INTRAMUSCULAR; INTRAVENOUS at 23:22

## 2024-01-03 RX ADMIN — SODIUM CHLORIDE: 4.5 INJECTION, SOLUTION INTRAVENOUS at 01:36

## 2024-01-03 RX ADMIN — ENOXAPARIN SODIUM 70 MG: 100 INJECTION SUBCUTANEOUS at 11:26

## 2024-01-03 RX ADMIN — ACETYLCYSTEINE 600 MG: 200 SOLUTION ORAL; RESPIRATORY (INHALATION) at 07:14

## 2024-01-03 RX ADMIN — METHYLPREDNISOLONE SODIUM SUCCINATE 40 MG: 40 INJECTION, POWDER, LYOPHILIZED, FOR SOLUTION INTRAMUSCULAR; INTRAVENOUS at 14:21

## 2024-01-03 RX ADMIN — IPRATROPIUM BROMIDE AND ALBUTEROL SULFATE 1 DOSE: 2.5; .5 SOLUTION RESPIRATORY (INHALATION) at 07:14

## 2024-01-03 RX ADMIN — CEFEPIME 2000 MG: 2 INJECTION, POWDER, FOR SOLUTION INTRAVENOUS at 01:37

## 2024-01-03 RX ADMIN — IPRATROPIUM BROMIDE AND ALBUTEROL SULFATE 1 DOSE: 2.5; .5 SOLUTION RESPIRATORY (INHALATION) at 19:52

## 2024-01-03 RX ADMIN — METOPROLOL TARTRATE 5 MG: 5 INJECTION INTRAVENOUS at 21:57

## 2024-01-03 RX ADMIN — ESCITALOPRAM OXALATE 20 MG: 10 TABLET, FILM COATED ORAL at 09:03

## 2024-01-03 RX ADMIN — CEFEPIME 2000 MG: 2 INJECTION, POWDER, FOR SOLUTION INTRAVENOUS at 09:07

## 2024-01-03 RX ADMIN — ENOXAPARIN SODIUM 70 MG: 100 INJECTION SUBCUTANEOUS at 21:58

## 2024-01-03 RX ADMIN — BUDESONIDE AND FORMOTEROL FUMARATE DIHYDRATE 2 PUFF: 160; 4.5 AEROSOL RESPIRATORY (INHALATION) at 19:54

## 2024-01-03 RX ADMIN — IPRATROPIUM BROMIDE AND ALBUTEROL SULFATE 1 DOSE: 2.5; .5 SOLUTION RESPIRATORY (INHALATION) at 11:14

## 2024-01-03 RX ADMIN — VANCOMYCIN HYDROCHLORIDE 750 MG: 750 INJECTION, POWDER, LYOPHILIZED, FOR SOLUTION INTRAVENOUS at 06:22

## 2024-01-03 RX ADMIN — ACETYLCYSTEINE 600 MG: 200 SOLUTION ORAL; RESPIRATORY (INHALATION) at 19:53

## 2024-01-03 RX ADMIN — IPRATROPIUM BROMIDE AND ALBUTEROL SULFATE 1 DOSE: 2.5; .5 SOLUTION RESPIRATORY (INHALATION) at 23:49

## 2024-01-03 RX ADMIN — METOPROLOL TARTRATE 5 MG: 5 INJECTION INTRAVENOUS at 10:44

## 2024-01-03 ASSESSMENT — PAIN SCALES - GENERAL
PAINLEVEL_OUTOF10: 0

## 2024-01-03 NOTE — ED NOTES
Pt took of NON Invasive ventilation and applied high flow nasal canula oxygen, called and made respiratory therapist aware, per therapist will continue to monitor and will make therapist aware if pt oxygen saturation falls below 90%.

## 2024-01-03 NOTE — DISCHARGE INSTR - COC
Continuity of Care Form    Patient Name: Mateo Street   :  1945  MRN:  [id number]    Admit date:  2024  Discharge date:  ***    Code Status Order: Full Code   Advance Directives:     Admitting Physician:  Mitch Somers MD  PCP: Salvador Riley MD    Discharging Nurse: ***  Discharging Hospital Unit/Room#: 3108/3108-A  Discharging Unit Phone Number: ***    Emergency Contact:   Extended Emergency Contact Information  Primary Emergency Contact: [address]E [address]  Home Phone: [phone]  Relation: Child  Secondary Emergency Contact: Renée Street  Mobile Phone: [phone]  Relation: Child   needed? No    Past Surgical History:  Past Surgical History:   Procedure Laterality Date    ATRIAL ABLATION SURGERY  10/2006    ATRIAL ABLATION SURGERY  10/06/2006    Bullous emphysema. No change compared with the previous exam.    COLONOSCOPY  2015    severe divertics, multiple polyps, hem    CT NEEDLE BIOPSY LUNG PERCUTANEOUS  2023    CT NEEDLE BIOPSY LUNG PERCUTANEOUS 2023 [facility] CT SCAN    HERNIA REPAIR      INFECTED SKIN DEBRIDEMENT  2012       Immunization History:   Immunization History   Administered Date(s) Administered    COVID-19, MODERNA Bivalent, (age 12y+), IM, 50 mcg/0.5 mL 2022    COVID-19, PFIZER PURPLE top, DILUTE for use, (age 12 y+), 30mcg/0.3mL 2021, 2021, 2021, 2021, 10/22/2021, 2022, 2022    Diphtheria Antitoxin ([place]) 2007    Influenza Virus Vaccine 2016, 2016, 10/01/2019, 10/01/2020, 2022    Influenza, FLUZONE (age 65 y+), High Dose, 0.7mL 10/25/2020, 2022, 10/24/2023    Influenza, High Dose (Fluzone 65 yrs and older) 10/30/2017, 2019, 10/22/2021    Pneumococcal, PCV20, PREVNAR 20, (age 6w+), IM, 0.5mL 2022    Pneumococcal, PPSV23, PNEUMOVAX 23, (age 2y+), SC/IM, 0.5mL 2014, 2020    TDaP, ADACEL (age 10y-64y), BOOSTRIX (age 10y+), IM, 0.5mL 2014, 10/31/2018,

## 2024-01-03 NOTE — ED NOTES
ED TO INPATIENT SBAR HANDOFF    Patient Name: Mateo Street   :  1945  78 y.o.   Preferred Name    Family/Caregiver Present yes   Restraints no   C-SSRS: Risk of Suicide: No Risk  Sitter no   Sepsis Risk Score Sepsis Risk Score: 11.32      Situation  Chief Complaint   Patient presents with    Fatigue    Shortness of Breath     Brief Description of Patient's Condition: Pt arrived from home with c/o shortness of breath and weakness, malaise, and cough. Wears 3L at baseline for COPD.   Mental Status: oriented, alert, coherent, logical, and thought processes intact  Arrived from: home    Imaging:   XR CHEST PORTABLE   Final Result   Bilateral upper lobe nodular opacities again noted      No acute cardiopulmonary process           Abnormal labs:   Abnormal Labs Reviewed   CBC WITH AUTO DIFFERENTIAL - Abnormal; Notable for the following components:       Result Value    WBC 14.7 (*)     RBC 3.78 (*)     Hemoglobin 12.0 (*)     Hematocrit 40.4 (*)     .9 (*)     MCH 31.7 (*)     MCHC 29.7 (*)     Segs Relative 68.2 (*)     Lymphocytes % 23.8 (*)     Monocytes % 5.8 (*)     All other components within normal limits   COMPREHENSIVE METABOLIC PANEL - Abnormal; Notable for the following components:    Glucose 123 (*)     BUN 30 (*)     AST 11 (*)     All other components within normal limits   TROPONIN - Abnormal; Notable for the following components:    Troponin, High Sensitivity 56 (*)     All other components within normal limits   BRAIN NATRIURETIC PEPTIDE - Abnormal; Notable for the following components:    Pro-.7 (*)     All other components within normal limits   BLOOD GAS, VENOUS - Abnormal; Notable for the following components:    pH, Benito 7.31 (*)     pCO2, Benito 64 (*)     Base Exc, Mixed 4 (*)     HCO3, Venous 32.2 (*)     O2 Sat, Benito 49.0 (*)     All other components within normal limits   BLOOD GAS, VENOUS - Abnormal; Notable for the following components:    pH, Benito 7.25 (*)     pCO2, Benito

## 2024-01-03 NOTE — CARE COORDINATION
.CM met with pt for d/c planning. Pt is on continuous bi-pap.  Sister at bedside. Permission received from pt for sister to answer questions about d/c plan.  Introduced self and updated white board.  Pt lives alone and has needed assistance with ADL's for the past month d/t being very weak.  [facility], has [facility] benefits as primary and [employer] Medicare as secondary. Noted that pt's son was working on getting pt into [facility] for rehab and that the referral was made by the ED/CM.  Verified with pt that he is agreeable to go to [facility]. Noted that pt is going to be receiving radiation and chemo when d/c'd.  CM spoke with Mindy/Kervin and she states that they will be able to accept pt on chemo and radiation once they get the approval from the VA.  SB needs to know the days and times that pt will be receiving both so that they can notify the VA for approval.  SB will need to know a day in advance with pt will be medically ready for d/c so that they VA can approve admission to [facility].  Pt and his sister do not know the times of chemo and radiation.  Pt's sister states that pt's son may know,but he is at work.  She sent pt's son a txt to notify this CM via VM of Chemo and radiation times so that CM can inform Mindy/Kervin.  D/c plan is [facility]. SB will need to know the chemo & radiation days & time and they will need to get the approval from the VA on day before pt is ready for d/c.  TE    PT/OT ordered.  They will see pt when he is medically stable to work with them.     D/c instructions are on front of packet located with the soft chart.     DO NOT SEND PT UNTIL [facility] GETS THE APPROVAL FROM THE VA.       01/03/24 3216   Service Assessment   Patient Orientation Alert and Oriented   Cognition Alert   History Provided By Other (see comment)  (SISTER D/T PT IS ON CONT BI-PAP)   Support Systems Family Members;Children   PCP Verified by CM Yes  ([facility])   Last Visit to PCP Within last 3 months

## 2024-01-04 ENCOUNTER — TELEPHONE (OUTPATIENT)
Dept: RADIATION ONCOLOGY | Age: 79
End: 2024-01-04

## 2024-01-04 ENCOUNTER — APPOINTMENT (OUTPATIENT)
Dept: GENERAL RADIOLOGY | Age: 79
DRG: 177 | End: 2024-01-04
Payer: OTHER GOVERNMENT

## 2024-01-04 ENCOUNTER — HOSPITAL ENCOUNTER (OUTPATIENT)
Dept: RADIATION ONCOLOGY | Age: 79
Discharge: HOME | End: 2024-01-04
Payer: OTHER GOVERNMENT

## 2024-01-04 PROBLEM — R65.10 SIRS (SYSTEMIC INFLAMMATORY RESPONSE SYNDROME) (HCC): Status: ACTIVE | Noted: 2024-01-04

## 2024-01-04 LAB
ALBUMIN SERPL-MCNC: 3.9 GM/DL (ref 3.4–5)
ALP BLD-CCNC: 118 IU/L (ref 40–128)
ALT SERPL-CCNC: 10 U/L (ref 10–40)
ANION GAP SERPL CALCULATED.3IONS-SCNC: 15 MMOL/L (ref 7–16)
AST SERPL-CCNC: 12 IU/L (ref 15–37)
BASE EXCESS: 3 (ref 0–3)
BASE EXCESS: 5 (ref 0–3)
BASOPHILS ABSOLUTE: 0.1 K/CU MM
BASOPHILS RELATIVE PERCENT: 0.3 % (ref 0–1)
BILIRUB SERPL-MCNC: 0.3 MG/DL (ref 0–1)
BILIRUBIN URINE: NEGATIVE MG/DL
BLOOD, URINE: NEGATIVE
BUN SERPL-MCNC: 32 MG/DL (ref 6–23)
CALCIUM SERPL-MCNC: 8.9 MG/DL (ref 8.3–10.6)
CHLORIDE BLD-SCNC: 104 MMOL/L (ref 99–110)
CLARITY: CLEAR
CO2: 21 MMOL/L (ref 21–32)
COLOR: YELLOW
COMMENT UA: NORMAL
COMMENT: ABNORMAL
COMMENT: ABNORMAL
CREAT SERPL-MCNC: 0.8 MG/DL (ref 0.9–1.3)
DIFFERENTIAL TYPE: ABNORMAL
DOSE AMOUNT: NORMAL
DOSE TIME: NORMAL
EKG ATRIAL RATE: 123 BPM
EKG ATRIAL RATE: 232 BPM
EKG DIAGNOSIS: NORMAL
EKG DIAGNOSIS: NORMAL
EKG P AXIS: 86 DEGREES
EKG P-R INTERVAL: 150 MS
EKG Q-T INTERVAL: 290 MS
EKG Q-T INTERVAL: 450 MS
EKG QRS DURATION: 164 MS
EKG QRS DURATION: 190 MS
EKG QTC CALCULATION (BAZETT): 415 MS
EKG QTC CALCULATION (BAZETT): 625 MS
EKG R AXIS: 103 DEGREES
EKG R AXIS: 89 DEGREES
EKG T AXIS: 264 DEGREES
EKG T AXIS: 31 DEGREES
EKG VENTRICULAR RATE: 116 BPM
EKG VENTRICULAR RATE: 123 BPM
EOSINOPHILS ABSOLUTE: 0.1 K/CU MM
EOSINOPHILS RELATIVE PERCENT: 0.3 % (ref 0–3)
GFR SERPL CREATININE-BSD FRML MDRD: >60 ML/MIN/1.73M2
GLUCOSE BLD-MCNC: 115 MG/DL (ref 70–99)
GLUCOSE BLD-MCNC: 76 MG/DL (ref 70–99)
GLUCOSE SERPL-MCNC: 113 MG/DL (ref 70–99)
GLUCOSE, URINE: NEGATIVE MG/DL
HCO3 VENOUS: 23.6 MMOL/L (ref 22–29)
HCO3 VENOUS: 26.1 MMOL/L (ref 22–29)
HCT VFR BLD CALC: 38.5 % (ref 42–52)
HEMOGLOBIN: 11.7 GM/DL (ref 13.5–18)
IMMATURE NEUTROPHIL %: 0.4 % (ref 0–0.43)
INR BLD: 1.2 INDEX
KETONES, URINE: NEGATIVE MG/DL
LEUKOCYTE ESTERASE, URINE: NEGATIVE
LYMPHOCYTES ABSOLUTE: 2.9 K/CU MM
LYMPHOCYTES RELATIVE PERCENT: 19 % (ref 24–44)
MAGNESIUM: 1.7 MG/DL (ref 1.8–2.4)
MCH RBC QN AUTO: 31.7 PG (ref 27–31)
MCHC RBC AUTO-ENTMCNC: 30.4 % (ref 32–36)
MCV RBC AUTO: 104.3 FL (ref 78–100)
MONOCYTES ABSOLUTE: 0.5 K/CU MM
MONOCYTES RELATIVE PERCENT: 3.4 % (ref 0–4)
MRSA, DNA, NASAL: NEGATIVE
NITRITE URINE, QUANTITATIVE: NEGATIVE
NUCLEATED RBC %: 0 %
O2 SAT, VEN: 62.6 % (ref 50–70)
O2 SAT, VEN: 94.5 % (ref 50–70)
PCO2, VEN: 55 MMHG (ref 41–51)
PCO2, VEN: 61 MMHG (ref 41–51)
PDW BLD-RTO: 14.3 % (ref 11.7–14.9)
PH VENOUS: 7.24 (ref 7.32–7.43)
PH VENOUS: 7.24 (ref 7.32–7.43)
PH, URINE: 6 (ref 5–8)
PHOSPHORUS: 2.5 MG/DL (ref 2.5–4.9)
PLATELET # BLD: 190 K/CU MM (ref 140–440)
PMV BLD AUTO: 10.1 FL (ref 7.5–11.1)
PO2, VEN: 191 MMHG (ref 28–48)
PO2, VEN: 40 MMHG (ref 28–48)
POTASSIUM SERPL-SCNC: 4.3 MMOL/L (ref 3.5–5.1)
PRO-BNP: 1243 PG/ML
PROCALCITONIN SERPL-MCNC: 0.11 NG/ML
PROTEIN UA: NEGATIVE MG/DL
PROTHROMBIN TIME: 15.5 SECONDS (ref 11.7–14.5)
RBC # BLD: 3.69 M/CU MM (ref 4.6–6.2)
SEGMENTED NEUTROPHILS ABSOLUTE COUNT: 11.7 K/CU MM
SEGMENTED NEUTROPHILS RELATIVE PERCENT: 76.6 % (ref 36–66)
SODIUM BLD-SCNC: 140 MMOL/L (ref 135–145)
SPECIFIC GRAVITY UA: 1.01 (ref 1–1.03)
SPECIMEN DESCRIPTION: NORMAL
TOTAL IMMATURE NEUTOROPHIL: 0.06 K/CU MM
TOTAL NUCLEATED RBC: 0 K/CU MM
TOTAL PROTEIN: 5.7 GM/DL (ref 6.4–8.2)
TROPONIN, HIGH SENSITIVITY: 58 NG/L (ref 0–22)
UROBILINOGEN, URINE: 1 MG/DL (ref 0.2–1)
VANCOMYCIN TROUGH: 15.2 UG/ML (ref 10–20)
WBC # BLD: 15.3 K/CU MM (ref 4–10.5)

## 2024-01-04 PROCEDURE — C9113 INJ PANTOPRAZOLE SODIUM, VIA: HCPCS | Performed by: STUDENT IN AN ORGANIZED HEALTH CARE EDUCATION/TRAINING PROGRAM

## 2024-01-04 PROCEDURE — 82805 BLOOD GASES W/O2 SATURATION: CPT

## 2024-01-04 PROCEDURE — 83880 ASSAY OF NATRIURETIC PEPTIDE: CPT

## 2024-01-04 PROCEDURE — 87086 URINE CULTURE/COLONY COUNT: CPT

## 2024-01-04 PROCEDURE — 2500000003 HC RX 250 WO HCPCS: Performed by: STUDENT IN AN ORGANIZED HEALTH CARE EDUCATION/TRAINING PROGRAM

## 2024-01-04 PROCEDURE — 84145 PROCALCITONIN (PCT): CPT

## 2024-01-04 PROCEDURE — 99223 1ST HOSP IP/OBS HIGH 75: CPT | Performed by: INTERNAL MEDICINE

## 2024-01-04 PROCEDURE — 85025 COMPLETE CBC W/AUTO DIFF WBC: CPT

## 2024-01-04 PROCEDURE — 6370000000 HC RX 637 (ALT 250 FOR IP): Performed by: STUDENT IN AN ORGANIZED HEALTH CARE EDUCATION/TRAINING PROGRAM

## 2024-01-04 PROCEDURE — 93005 ELECTROCARDIOGRAM TRACING: CPT | Performed by: STUDENT IN AN ORGANIZED HEALTH CARE EDUCATION/TRAINING PROGRAM

## 2024-01-04 PROCEDURE — 2580000003 HC RX 258

## 2024-01-04 PROCEDURE — 6360000002 HC RX W HCPCS: Performed by: STUDENT IN AN ORGANIZED HEALTH CARE EDUCATION/TRAINING PROGRAM

## 2024-01-04 PROCEDURE — 80202 ASSAY OF VANCOMYCIN: CPT

## 2024-01-04 PROCEDURE — 97116 GAIT TRAINING THERAPY: CPT

## 2024-01-04 PROCEDURE — 93010 ELECTROCARDIOGRAM REPORT: CPT | Performed by: INTERNAL MEDICINE

## 2024-01-04 PROCEDURE — 77014 CHG CT GUIDANCE RADIATION THERAPY FLDS PLACEMENT: CPT | Performed by: RADIOLOGY

## 2024-01-04 PROCEDURE — 84484 ASSAY OF TROPONIN QUANT: CPT

## 2024-01-04 PROCEDURE — 94150 VITAL CAPACITY TEST: CPT

## 2024-01-04 PROCEDURE — 97530 THERAPEUTIC ACTIVITIES: CPT

## 2024-01-04 PROCEDURE — 85610 PROTHROMBIN TIME: CPT

## 2024-01-04 PROCEDURE — 94640 AIRWAY INHALATION TREATMENT: CPT

## 2024-01-04 PROCEDURE — 2580000003 HC RX 258: Performed by: STUDENT IN AN ORGANIZED HEALTH CARE EDUCATION/TRAINING PROGRAM

## 2024-01-04 PROCEDURE — 87899 AGENT NOS ASSAY W/OPTIC: CPT

## 2024-01-04 PROCEDURE — 83735 ASSAY OF MAGNESIUM: CPT

## 2024-01-04 PROCEDURE — 2700000000 HC OXYGEN THERAPY PER DAY

## 2024-01-04 PROCEDURE — 6360000002 HC RX W HCPCS: Performed by: INTERNAL MEDICINE

## 2024-01-04 PROCEDURE — 36415 COLL VENOUS BLD VENIPUNCTURE: CPT

## 2024-01-04 PROCEDURE — 2140000000 HC CCU INTERMEDIATE R&B

## 2024-01-04 PROCEDURE — 97166 OT EVAL MOD COMPLEX 45 MIN: CPT

## 2024-01-04 PROCEDURE — 6360000002 HC RX W HCPCS

## 2024-01-04 PROCEDURE — 80053 COMPREHEN METABOLIC PANEL: CPT

## 2024-01-04 PROCEDURE — 2580000003 HC RX 258: Performed by: INTERNAL MEDICINE

## 2024-01-04 PROCEDURE — 87449 NOS EACH ORGANISM AG IA: CPT

## 2024-01-04 PROCEDURE — 81003 URINALYSIS AUTO W/O SCOPE: CPT

## 2024-01-04 PROCEDURE — 97163 PT EVAL HIGH COMPLEX 45 MIN: CPT

## 2024-01-04 PROCEDURE — APPNB60 APP NON BILLABLE TIME 46-60 MINS: Performed by: PHYSICIAN ASSISTANT

## 2024-01-04 PROCEDURE — 77386 HC NTSTY MODUL RAD TX DLVR CPLX: CPT | Performed by: RADIOLOGY

## 2024-01-04 PROCEDURE — 82962 GLUCOSE BLOOD TEST: CPT

## 2024-01-04 PROCEDURE — 94664 DEMO&/EVAL PT USE INHALER: CPT

## 2024-01-04 PROCEDURE — 84100 ASSAY OF PHOSPHORUS: CPT

## 2024-01-04 PROCEDURE — 71045 X-RAY EXAM CHEST 1 VIEW: CPT

## 2024-01-04 RX ORDER — MORPHINE SULFATE 20 MG/ML
10 SOLUTION ORAL EVERY 4 HOURS PRN
Status: DISCONTINUED | OUTPATIENT
Start: 2024-01-04 | End: 2024-01-12

## 2024-01-04 RX ORDER — MORPHINE SULFATE 2 MG/ML
2 INJECTION, SOLUTION INTRAMUSCULAR; INTRAVENOUS ONCE
Status: COMPLETED | OUTPATIENT
Start: 2024-01-04 | End: 2024-01-04

## 2024-01-04 RX ORDER — SODIUM CHLORIDE 450 MG/100ML
INJECTION, SOLUTION INTRAVENOUS CONTINUOUS
Status: DISCONTINUED | OUTPATIENT
Start: 2024-01-04 | End: 2024-01-07

## 2024-01-04 RX ADMIN — METOPROLOL TARTRATE 5 MG: 5 INJECTION INTRAVENOUS at 16:06

## 2024-01-04 RX ADMIN — METHYLPREDNISOLONE SODIUM SUCCINATE 40 MG: 40 INJECTION, POWDER, LYOPHILIZED, FOR SOLUTION INTRAMUSCULAR; INTRAVENOUS at 13:49

## 2024-01-04 RX ADMIN — IPRATROPIUM BROMIDE AND ALBUTEROL SULFATE 1 DOSE: 2.5; .5 SOLUTION RESPIRATORY (INHALATION) at 11:00

## 2024-01-04 RX ADMIN — METOPROLOL TARTRATE 5 MG: 5 INJECTION INTRAVENOUS at 06:26

## 2024-01-04 RX ADMIN — Medication 10 MG: at 13:47

## 2024-01-04 RX ADMIN — CEFEPIME 2000 MG: 2 INJECTION, POWDER, FOR SOLUTION INTRAVENOUS at 01:26

## 2024-01-04 RX ADMIN — ACETYLCYSTEINE 600 MG: 200 SOLUTION ORAL; RESPIRATORY (INHALATION) at 20:26

## 2024-01-04 RX ADMIN — IPRATROPIUM BROMIDE AND ALBUTEROL SULFATE 1 DOSE: 2.5; .5 SOLUTION RESPIRATORY (INHALATION) at 07:16

## 2024-01-04 RX ADMIN — VANCOMYCIN HYDROCHLORIDE 750 MG: 750 INJECTION, POWDER, LYOPHILIZED, FOR SOLUTION INTRAVENOUS at 06:30

## 2024-01-04 RX ADMIN — PANTOPRAZOLE SODIUM 40 MG: 40 INJECTION, POWDER, FOR SOLUTION INTRAVENOUS at 07:58

## 2024-01-04 RX ADMIN — ENOXAPARIN SODIUM 70 MG: 100 INJECTION SUBCUTANEOUS at 07:58

## 2024-01-04 RX ADMIN — BUDESONIDE AND FORMOTEROL FUMARATE DIHYDRATE 2 PUFF: 160; 4.5 AEROSOL RESPIRATORY (INHALATION) at 07:15

## 2024-01-04 RX ADMIN — IPRATROPIUM BROMIDE AND ALBUTEROL SULFATE 1 DOSE: 2.5; .5 SOLUTION RESPIRATORY (INHALATION) at 20:26

## 2024-01-04 RX ADMIN — MORPHINE SULFATE 2 MG: 2 INJECTION, SOLUTION INTRAMUSCULAR; INTRAVENOUS at 07:55

## 2024-01-04 RX ADMIN — METHYLPREDNISOLONE SODIUM SUCCINATE 40 MG: 40 INJECTION, POWDER, LYOPHILIZED, FOR SOLUTION INTRAMUSCULAR; INTRAVENOUS at 20:51

## 2024-01-04 RX ADMIN — IPRATROPIUM BROMIDE AND ALBUTEROL SULFATE 1 DOSE: 2.5; .5 SOLUTION RESPIRATORY (INHALATION) at 04:01

## 2024-01-04 RX ADMIN — ACETYLCYSTEINE 600 MG: 200 SOLUTION ORAL; RESPIRATORY (INHALATION) at 07:15

## 2024-01-04 RX ADMIN — METOPROLOL TARTRATE 5 MG: 5 INJECTION INTRAVENOUS at 20:55

## 2024-01-04 RX ADMIN — IPRATROPIUM BROMIDE AND ALBUTEROL SULFATE 1 DOSE: 2.5; .5 SOLUTION RESPIRATORY (INHALATION) at 23:47

## 2024-01-04 RX ADMIN — ENOXAPARIN SODIUM 70 MG: 100 INJECTION SUBCUTANEOUS at 20:55

## 2024-01-04 RX ADMIN — CEFEPIME 2000 MG: 2 INJECTION, POWDER, FOR SOLUTION INTRAVENOUS at 08:07

## 2024-01-04 RX ADMIN — BUDESONIDE AND FORMOTEROL FUMARATE DIHYDRATE 2 PUFF: 160; 4.5 AEROSOL RESPIRATORY (INHALATION) at 20:27

## 2024-01-04 RX ADMIN — METOPROLOL TARTRATE 5 MG: 5 INJECTION INTRAVENOUS at 10:35

## 2024-01-04 RX ADMIN — METHYLPREDNISOLONE SODIUM SUCCINATE 40 MG: 40 INJECTION, POWDER, LYOPHILIZED, FOR SOLUTION INTRAMUSCULAR; INTRAVENOUS at 07:20

## 2024-01-04 RX ADMIN — SODIUM CHLORIDE, PRESERVATIVE FREE 10 ML: 5 INJECTION INTRAVENOUS at 08:09

## 2024-01-04 RX ADMIN — CEFEPIME 2000 MG: 2 INJECTION, POWDER, FOR SOLUTION INTRAVENOUS at 16:12

## 2024-01-04 ASSESSMENT — PAIN SCALES - WONG BAKER
WONGBAKER_NUMERICALRESPONSE: 0
WONGBAKER_NUMERICALRESPONSE: 2
WONGBAKER_NUMERICALRESPONSE: 2
WONGBAKER_NUMERICALRESPONSE: 0

## 2024-01-04 ASSESSMENT — PAIN - FUNCTIONAL ASSESSMENT
PAIN_FUNCTIONAL_ASSESSMENT: PREVENTS OR INTERFERES SOME ACTIVE ACTIVITIES AND ADLS
PAIN_FUNCTIONAL_ASSESSMENT: ACTIVITIES ARE NOT PREVENTED

## 2024-01-04 ASSESSMENT — PAIN DESCRIPTION - DESCRIPTORS
DESCRIPTORS: ACHING
DESCRIPTORS: ACHING

## 2024-01-04 ASSESSMENT — PAIN SCALES - GENERAL
PAINLEVEL_OUTOF10: 1
PAINLEVEL_OUTOF10: 0
PAINLEVEL_OUTOF10: 10
PAINLEVEL_OUTOF10: 0
PAINLEVEL_OUTOF10: 10
PAINLEVEL_OUTOF10: 0
PAINLEVEL_OUTOF10: 0
PAINLEVEL_OUTOF10: 1

## 2024-01-04 ASSESSMENT — PAIN DESCRIPTION - LOCATION
LOCATION: HEAD
LOCATION: HEAD

## 2024-01-04 NOTE — CARE COORDINATION
Transport for radiation cannot be set up until approval is received from CM/Director. Message sent to  director.  TE

## 2024-01-04 NOTE — CARE COORDINATION
Charge nurse notified PARISA that [facility] at [facility].  PARISA sent a message to PARISA/Director/Aleksandra to see if she will approve transportation to and from radiation or not.  TE

## 2024-01-04 NOTE — TELEPHONE ENCOUNTER
Phone call to [facility], spoke with Fran regarding order for pt to come for radiation treatment starting today per Dr. Bond. Pt is scheduled for treatment  today and tomorrow at 3:15 PM and will need transportation to and from [facility]. [facility] staff to arrange for transport,  time 2:45 PM for today 1/4 and tomorrow 1/5. This RN's direct contact info given, advised to call with any questions or concerns- voiced understanding.

## 2024-01-04 NOTE — CARE COORDINATION
Geovanna to arrange transport [facility] from [facility] for radiation per  director/Aleksandra.  Transport arranged with Ashley/[facility].  ETA is [phone].  Notified charge nurse/Moe RUBIO

## 2024-01-04 NOTE — CARE COORDINATION
Spoke w/Mindy/Kervin and she states that the VA will cover the cost of radiation and chemo.  She will start the pre-cert with the [facility] when PT/OT notes are in d/t it will take a couple of days.  D/c instructions are on front of packet located with the soft chart.  DO NOT SEND PT TO SNF UNTIL THE PRE-CERT IS RECEIVED.  TE

## 2024-01-05 ENCOUNTER — HOSPITAL ENCOUNTER (OUTPATIENT)
Dept: RADIATION ONCOLOGY | Age: 79
Discharge: HOME | End: 2024-01-05

## 2024-01-05 ENCOUNTER — ANESTHESIA EVENT (OUTPATIENT)
Dept: ENDOSCOPY | Age: 79
End: 2024-01-05
Payer: OTHER GOVERNMENT

## 2024-01-05 PROBLEM — E43 SEVERE MALNUTRITION (HCC): Status: ACTIVE | Noted: 2024-01-05

## 2024-01-05 LAB
ALBUMIN SERPL-MCNC: 3.7 GM/DL (ref 3.4–5)
ANION GAP SERPL CALCULATED.3IONS-SCNC: 15 MMOL/L (ref 7–16)
BASOPHILS ABSOLUTE: 0 K/CU MM
BASOPHILS RELATIVE PERCENT: 0.2 % (ref 0–1)
BUN SERPL-MCNC: 25 MG/DL (ref 6–23)
CALCIUM SERPL-MCNC: 8.9 MG/DL (ref 8.3–10.6)
CHLORIDE BLD-SCNC: 104 MMOL/L (ref 99–110)
CO2: 22 MMOL/L (ref 21–32)
CREAT SERPL-MCNC: 0.7 MG/DL (ref 0.9–1.3)
CULTURE: NORMAL
CULTURE: NORMAL
DIFFERENTIAL TYPE: ABNORMAL
EOSINOPHILS ABSOLUTE: 0.1 K/CU MM
EOSINOPHILS RELATIVE PERCENT: 0.5 % (ref 0–3)
GFR SERPL CREATININE-BSD FRML MDRD: >60 ML/MIN/1.73M2
GLUCOSE SERPL-MCNC: 93 MG/DL (ref 70–99)
HCT VFR BLD CALC: 40.2 % (ref 42–52)
HEMOGLOBIN: 12.1 GM/DL (ref 13.5–18)
IMMATURE NEUTROPHIL %: 0.7 % (ref 0–0.43)
L PNEUMO AG UR QL IA: NEGATIVE
LYMPHOCYTES ABSOLUTE: 2.9 K/CU MM
LYMPHOCYTES RELATIVE PERCENT: 21.5 % (ref 24–44)
Lab: NORMAL
Lab: NORMAL
MAGNESIUM: 1.6 MG/DL (ref 1.8–2.4)
MCH RBC QN AUTO: 31.7 PG (ref 27–31)
MCHC RBC AUTO-ENTMCNC: 30.1 % (ref 32–36)
MCV RBC AUTO: 105.2 FL (ref 78–100)
MONOCYTES ABSOLUTE: 0.8 K/CU MM
MONOCYTES RELATIVE PERCENT: 6 % (ref 0–4)
NUCLEATED RBC %: 0 %
PDW BLD-RTO: 14.3 % (ref 11.7–14.9)
PHOSPHORUS: 2.4 MG/DL (ref 2.5–4.9)
PLATELET # BLD: 184 K/CU MM (ref 140–440)
PMV BLD AUTO: 9.9 FL (ref 7.5–11.1)
POTASSIUM SERPL-SCNC: 4.2 MMOL/L (ref 3.5–5.1)
RBC # BLD: 3.82 M/CU MM (ref 4.6–6.2)
S PNEUM AG CSF QL: NORMAL
SEGMENTED NEUTROPHILS ABSOLUTE COUNT: 9.4 K/CU MM
SEGMENTED NEUTROPHILS RELATIVE PERCENT: 71.1 % (ref 36–66)
SODIUM BLD-SCNC: 141 MMOL/L (ref 135–145)
SPECIMEN: NORMAL
SPECIMEN: NORMAL
TOTAL IMMATURE NEUTOROPHIL: 0.09 K/CU MM
TOTAL NUCLEATED RBC: 0 K/CU MM
WBC # BLD: 13.3 K/CU MM (ref 4–10.5)

## 2024-01-05 PROCEDURE — 2580000003 HC RX 258: Performed by: STUDENT IN AN ORGANIZED HEALTH CARE EDUCATION/TRAINING PROGRAM

## 2024-01-05 PROCEDURE — 85025 COMPLETE CBC W/AUTO DIFF WBC: CPT

## 2024-01-05 PROCEDURE — 94150 VITAL CAPACITY TEST: CPT

## 2024-01-05 PROCEDURE — 2580000003 HC RX 258

## 2024-01-05 PROCEDURE — 2500000003 HC RX 250 WO HCPCS: Performed by: STUDENT IN AN ORGANIZED HEALTH CARE EDUCATION/TRAINING PROGRAM

## 2024-01-05 PROCEDURE — 76937 US GUIDE VASCULAR ACCESS: CPT

## 2024-01-05 PROCEDURE — 80069 RENAL FUNCTION PANEL: CPT

## 2024-01-05 PROCEDURE — 6360000002 HC RX W HCPCS: Performed by: INTERNAL MEDICINE

## 2024-01-05 PROCEDURE — 6360000002 HC RX W HCPCS

## 2024-01-05 PROCEDURE — 2700000000 HC OXYGEN THERAPY PER DAY

## 2024-01-05 PROCEDURE — 6370000000 HC RX 637 (ALT 250 FOR IP): Performed by: STUDENT IN AN ORGANIZED HEALTH CARE EDUCATION/TRAINING PROGRAM

## 2024-01-05 PROCEDURE — 92526 ORAL FUNCTION THERAPY: CPT

## 2024-01-05 PROCEDURE — 94761 N-INVAS EAR/PLS OXIMETRY MLT: CPT

## 2024-01-05 PROCEDURE — 94664 DEMO&/EVAL PT USE INHALER: CPT

## 2024-01-05 PROCEDURE — 99222 1ST HOSP IP/OBS MODERATE 55: CPT | Performed by: INTERNAL MEDICINE

## 2024-01-05 PROCEDURE — 36415 COLL VENOUS BLD VENIPUNCTURE: CPT

## 2024-01-05 PROCEDURE — 83735 ASSAY OF MAGNESIUM: CPT

## 2024-01-05 PROCEDURE — 2140000000 HC CCU INTERMEDIATE R&B

## 2024-01-05 PROCEDURE — 99232 SBSQ HOSP IP/OBS MODERATE 35: CPT | Performed by: PHYSICIAN ASSISTANT

## 2024-01-05 PROCEDURE — 94640 AIRWAY INHALATION TREATMENT: CPT

## 2024-01-05 PROCEDURE — 6360000002 HC RX W HCPCS: Performed by: STUDENT IN AN ORGANIZED HEALTH CARE EDUCATION/TRAINING PROGRAM

## 2024-01-05 RX ORDER — DIGOXIN 0.25 MG/ML
500 INJECTION INTRAMUSCULAR; INTRAVENOUS ONCE
Status: COMPLETED | OUTPATIENT
Start: 2024-01-05 | End: 2024-01-05

## 2024-01-05 RX ORDER — MAGNESIUM SULFATE IN WATER 40 MG/ML
2000 INJECTION, SOLUTION INTRAVENOUS ONCE
Status: COMPLETED | OUTPATIENT
Start: 2024-01-05 | End: 2024-01-05

## 2024-01-05 RX ORDER — METOPROLOL TARTRATE 1 MG/ML
7.5 INJECTION, SOLUTION INTRAVENOUS EVERY 6 HOURS
Status: DISCONTINUED | OUTPATIENT
Start: 2024-01-05 | End: 2024-01-06

## 2024-01-05 RX ADMIN — METOPROLOL TARTRATE 7.5 MG: 5 INJECTION INTRAVENOUS at 21:51

## 2024-01-05 RX ADMIN — Medication 10 MG: at 00:12

## 2024-01-05 RX ADMIN — ESCITALOPRAM OXALATE 20 MG: 10 TABLET, FILM COATED ORAL at 10:14

## 2024-01-05 RX ADMIN — ENOXAPARIN SODIUM 70 MG: 100 INJECTION SUBCUTANEOUS at 10:14

## 2024-01-05 RX ADMIN — BUTALBITAL, ACETAMINOPHEN, AND CAFFEINE 1 TABLET: 325; 50; 40 TABLET ORAL at 21:52

## 2024-01-05 RX ADMIN — IPRATROPIUM BROMIDE AND ALBUTEROL SULFATE 1 DOSE: 2.5; .5 SOLUTION RESPIRATORY (INHALATION) at 07:38

## 2024-01-05 RX ADMIN — MAGNESIUM SULFATE HEPTAHYDRATE 2000 MG: 40 INJECTION, SOLUTION INTRAVENOUS at 21:47

## 2024-01-05 RX ADMIN — DIGOXIN 500 MCG: 250 INJECTION, SOLUTION INTRAMUSCULAR; INTRAVENOUS at 16:12

## 2024-01-05 RX ADMIN — SODIUM CHLORIDE: 4.5 INJECTION, SOLUTION INTRAVENOUS at 00:13

## 2024-01-05 RX ADMIN — TIZANIDINE 2 MG: 2 TABLET ORAL at 21:52

## 2024-01-05 RX ADMIN — Medication 10 MG: at 19:03

## 2024-01-05 RX ADMIN — ACETYLCYSTEINE 600 MG: 200 SOLUTION ORAL; RESPIRATORY (INHALATION) at 07:39

## 2024-01-05 RX ADMIN — BUSPIRONE HYDROCHLORIDE 30 MG: 15 TABLET ORAL at 10:13

## 2024-01-05 RX ADMIN — METOPROLOL TARTRATE 5 MG: 5 INJECTION INTRAVENOUS at 05:14

## 2024-01-05 RX ADMIN — DIPHENHYDRAMINE HYDROCHLORIDE 25 MG: 50 INJECTION, SOLUTION INTRAMUSCULAR; INTRAVENOUS at 21:52

## 2024-01-05 RX ADMIN — ATORVASTATIN CALCIUM 40 MG: 40 TABLET, FILM COATED ORAL at 10:14

## 2024-01-05 RX ADMIN — SODIUM CHLORIDE, PRESERVATIVE FREE 10 ML: 5 INJECTION INTRAVENOUS at 21:53

## 2024-01-05 RX ADMIN — ONDANSETRON 4 MG: 4 TABLET, ORALLY DISINTEGRATING ORAL at 08:27

## 2024-01-05 RX ADMIN — IPRATROPIUM BROMIDE AND ALBUTEROL SULFATE 1 DOSE: 2.5; .5 SOLUTION RESPIRATORY (INHALATION) at 16:53

## 2024-01-05 RX ADMIN — CEFEPIME 2000 MG: 2 INJECTION, POWDER, FOR SOLUTION INTRAVENOUS at 00:13

## 2024-01-05 RX ADMIN — BUDESONIDE AND FORMOTEROL FUMARATE DIHYDRATE 2 PUFF: 160; 4.5 AEROSOL RESPIRATORY (INHALATION) at 07:40

## 2024-01-05 ASSESSMENT — PAIN SCALES - GENERAL
PAINLEVEL_OUTOF10: 8
PAINLEVEL_OUTOF10: 0
PAINLEVEL_OUTOF10: 7
PAINLEVEL_OUTOF10: 6
PAINLEVEL_OUTOF10: 0
PAINLEVEL_OUTOF10: 0

## 2024-01-05 ASSESSMENT — PAIN DESCRIPTION - LOCATION
LOCATION: ARM
LOCATION: GENERALIZED
LOCATION: GENERALIZED
LOCATION: HEAD

## 2024-01-05 ASSESSMENT — PAIN DESCRIPTION - ORIENTATION: ORIENTATION: MID

## 2024-01-05 ASSESSMENT — PAIN SCALES - WONG BAKER
WONGBAKER_NUMERICALRESPONSE: 0

## 2024-01-05 ASSESSMENT — PAIN - FUNCTIONAL ASSESSMENT
PAIN_FUNCTIONAL_ASSESSMENT: ACTIVITIES ARE NOT PREVENTED
PAIN_FUNCTIONAL_ASSESSMENT: ACTIVITIES ARE NOT PREVENTED

## 2024-01-05 ASSESSMENT — PAIN DESCRIPTION - DESCRIPTORS
DESCRIPTORS: ACHING

## 2024-01-05 NOTE — ANESTHESIA PRE PROCEDURE
PROTIME 15.5 2024 04:29 PM    INR 1.2 2024 04:29 PM    APTT 29.7 2023 07:50 AM       HCG (If Applicable): No results found for: \"PREGTESTUR\", \"PREGSERUM\", \"HCG\", \"HCGQUANT\"     ABGs:   Lab Results   Component Value Date/Time    PO2ART 55 02/15/2021 12:30 PM    VLN9CNC 40.0 02/15/2021 12:30 PM    QOG6IJI 29.1 02/15/2021 12:30 PM        Type & Screen (If Applicable):  No results found for: \"LABABO\", \"LABRH\"    Drug/Infectious Status (If Applicable):  No results found for: \"HIV\", \"HEPCAB\"    COVID-19 Screening (If Applicable):   Lab Results   Component Value Date/Time    COVID19 NOT DETECTED 2024 07:43 PM    COVID19 NOT DETECTED 2024 04:05 PM           Anesthesia Evaluation  Patient summary reviewed no history of anesthetic complications:   Mallampati: IIAirway:   TM distance: >3 FB   Neck ROM: full  Mouth opening: > = 3 FB   normal examDental:          normal exam Pulmonary:   (+)  COPD:                                    ROS comment: Smoking Status: Former - 20 pack years  Quit Smokin02    CXR 2023:  FINDINGS:  Emphysema.  Bilateral pulmonary nodules are unchanged.  No pneumothorax or  pleural effusion.  Cardiac and mediastinal contours normal.  No acute osseous  abnormality.     IMPRESSION:  Stable examination.  No acute cardiopulmonary abnormality.    Metastatic lung cancer   Cardiovascular:  Exercise tolerance: poor (<4 METS),   (+) hypertension:, dysrhythmias: atrial flutter, hyperlipidemia               ROS comment:   Atrial flutter with 2:1 AV conduction  Nonspecific intraventricular block  Abnormal ECG  When compared with ECG of 2024 16:42,  Inverted T waves have replaced nonspecific T wave abnormality in Inferior leads  Inverted T waves have replaced nonspecific T wave abnormality in Anterolateral leads  Confirmed by MARY NEWTON ([id number]) on 2024 3:57:22 PM         Neuro/Psych:   (+) headaches:            GI/Hepatic/Renal:             Endo/Other:    (+)

## 2024-01-05 NOTE — CARE COORDINATION
Notified Elier that pt is going to have a PEG tube placed tomorrow and informed her that the PT/OT notes were put in yesterday.  She states that she will go ahead and start the pre-cert with the [facility] today.  WILL NOT RECEIVE THE PRE-CERT OVER THE WEEKEND.  TE

## 2024-01-06 ENCOUNTER — ANESTHESIA (OUTPATIENT)
Dept: ENDOSCOPY | Age: 79
End: 2024-01-06
Payer: OTHER GOVERNMENT

## 2024-01-06 LAB
ALBUMIN SERPL-MCNC: 3.6 GM/DL (ref 3.4–5)
ANION GAP SERPL CALCULATED.3IONS-SCNC: 16 MMOL/L (ref 7–16)
BASOPHILS ABSOLUTE: 0 K/CU MM
BASOPHILS RELATIVE PERCENT: 0.2 % (ref 0–1)
BUN SERPL-MCNC: 22 MG/DL (ref 6–23)
CALCIUM SERPL-MCNC: 8.6 MG/DL (ref 8.3–10.6)
CHLORIDE BLD-SCNC: 102 MMOL/L (ref 99–110)
CO2: 19 MMOL/L (ref 21–32)
CREAT SERPL-MCNC: 0.7 MG/DL (ref 0.9–1.3)
DIFFERENTIAL TYPE: ABNORMAL
EKG ATRIAL RATE: 225 BPM
EKG ATRIAL RATE: 234 BPM
EKG DIAGNOSIS: NORMAL
EKG DIAGNOSIS: NORMAL
EKG P AXIS: 266 DEGREES
EKG P AXIS: 84 DEGREES
EKG Q-T INTERVAL: 356 MS
EKG Q-T INTERVAL: 396 MS
EKG QRS DURATION: 100 MS
EKG QRS DURATION: 98 MS
EKG QTC CALCULATION (BAZETT): 454 MS
EKG QTC CALCULATION (BAZETT): 459 MS
EKG R AXIS: 102 DEGREES
EKG R AXIS: 95 DEGREES
EKG T AXIS: 45 DEGREES
EKG T AXIS: 49 DEGREES
EKG VENTRICULAR RATE: 100 BPM
EKG VENTRICULAR RATE: 79 BPM
EOSINOPHILS ABSOLUTE: 0.1 K/CU MM
EOSINOPHILS RELATIVE PERCENT: 0.7 % (ref 0–3)
GFR SERPL CREATININE-BSD FRML MDRD: >60 ML/MIN/1.73M2
GLUCOSE BLD-MCNC: 85 MG/DL (ref 70–99)
GLUCOSE BLD-MCNC: 87 MG/DL (ref 70–99)
GLUCOSE BLD-MCNC: 88 MG/DL (ref 70–99)
GLUCOSE BLD-MCNC: 95 MG/DL (ref 70–99)
GLUCOSE SERPL-MCNC: 88 MG/DL (ref 70–99)
HCT VFR BLD CALC: 38.9 % (ref 42–52)
HEMOGLOBIN: 12 GM/DL (ref 13.5–18)
IMMATURE NEUTROPHIL %: 1.1 % (ref 0–0.43)
LYMPHOCYTES ABSOLUTE: 2.6 K/CU MM
LYMPHOCYTES RELATIVE PERCENT: 18.1 % (ref 24–44)
MAGNESIUM: 2.1 MG/DL (ref 1.8–2.4)
MCH RBC QN AUTO: 31.8 PG (ref 27–31)
MCHC RBC AUTO-ENTMCNC: 30.8 % (ref 32–36)
MCV RBC AUTO: 103.2 FL (ref 78–100)
MONOCYTES ABSOLUTE: 0.6 K/CU MM
MONOCYTES RELATIVE PERCENT: 4.1 % (ref 0–4)
NUCLEATED RBC %: 0 %
PDW BLD-RTO: 14.1 % (ref 11.7–14.9)
PHOSPHORUS: 2.5 MG/DL (ref 2.5–4.9)
PLATELET # BLD: 174 K/CU MM (ref 140–440)
PMV BLD AUTO: 9.7 FL (ref 7.5–11.1)
POTASSIUM SERPL-SCNC: 4.5 MMOL/L (ref 3.5–5.1)
RBC # BLD: 3.77 M/CU MM (ref 4.6–6.2)
SEGMENTED NEUTROPHILS ABSOLUTE COUNT: 11 K/CU MM
SEGMENTED NEUTROPHILS RELATIVE PERCENT: 75.8 % (ref 36–66)
SODIUM BLD-SCNC: 137 MMOL/L (ref 135–145)
TOTAL IMMATURE NEUTOROPHIL: 0.16 K/CU MM
TOTAL NUCLEATED RBC: 0 K/CU MM
WBC # BLD: 14.5 K/CU MM (ref 4–10.5)

## 2024-01-06 PROCEDURE — 3700000001 HC ADD 15 MINUTES (ANESTHESIA): Performed by: INTERNAL MEDICINE

## 2024-01-06 PROCEDURE — 2500000003 HC RX 250 WO HCPCS: Performed by: NURSE ANESTHETIST, CERTIFIED REGISTERED

## 2024-01-06 PROCEDURE — 6370000000 HC RX 637 (ALT 250 FOR IP): Performed by: INTERNAL MEDICINE

## 2024-01-06 PROCEDURE — 93005 ELECTROCARDIOGRAM TRACING: CPT | Performed by: STUDENT IN AN ORGANIZED HEALTH CARE EDUCATION/TRAINING PROGRAM

## 2024-01-06 PROCEDURE — 93010 ELECTROCARDIOGRAM REPORT: CPT | Performed by: INTERNAL MEDICINE

## 2024-01-06 PROCEDURE — 6360000002 HC RX W HCPCS: Performed by: INTERNAL MEDICINE

## 2024-01-06 PROCEDURE — 6360000002 HC RX W HCPCS: Performed by: NURSE ANESTHETIST, CERTIFIED REGISTERED

## 2024-01-06 PROCEDURE — 6360000002 HC RX W HCPCS: Performed by: STUDENT IN AN ORGANIZED HEALTH CARE EDUCATION/TRAINING PROGRAM

## 2024-01-06 PROCEDURE — 83735 ASSAY OF MAGNESIUM: CPT

## 2024-01-06 PROCEDURE — 99233 SBSQ HOSP IP/OBS HIGH 50: CPT | Performed by: INTERNAL MEDICINE

## 2024-01-06 PROCEDURE — 93005 ELECTROCARDIOGRAM TRACING: CPT | Performed by: INTERNAL MEDICINE

## 2024-01-06 PROCEDURE — 36415 COLL VENOUS BLD VENIPUNCTURE: CPT

## 2024-01-06 PROCEDURE — 0DJ08ZZ INSPECTION OF UPPER INTESTINAL TRACT, VIA NATURAL OR ARTIFICIAL OPENING ENDOSCOPIC: ICD-10-PCS | Performed by: INTERNAL MEDICINE

## 2024-01-06 PROCEDURE — 85025 COMPLETE CBC W/AUTO DIFF WBC: CPT

## 2024-01-06 PROCEDURE — 2580000003 HC RX 258: Performed by: INTERNAL MEDICINE

## 2024-01-06 PROCEDURE — 2140000000 HC CCU INTERMEDIATE R&B

## 2024-01-06 PROCEDURE — 3700000000 HC ANESTHESIA ATTENDED CARE: Performed by: INTERNAL MEDICINE

## 2024-01-06 PROCEDURE — 6360000002 HC RX W HCPCS

## 2024-01-06 PROCEDURE — 6370000000 HC RX 637 (ALT 250 FOR IP): Performed by: STUDENT IN AN ORGANIZED HEALTH CARE EDUCATION/TRAINING PROGRAM

## 2024-01-06 PROCEDURE — 82962 GLUCOSE BLOOD TEST: CPT

## 2024-01-06 PROCEDURE — 94761 N-INVAS EAR/PLS OXIMETRY MLT: CPT

## 2024-01-06 PROCEDURE — 2700000000 HC OXYGEN THERAPY PER DAY

## 2024-01-06 PROCEDURE — 94640 AIRWAY INHALATION TREATMENT: CPT

## 2024-01-06 PROCEDURE — 0DH63UZ INSERTION OF FEEDING DEVICE INTO STOMACH, PERCUTANEOUS APPROACH: ICD-10-PCS | Performed by: INTERNAL MEDICINE

## 2024-01-06 PROCEDURE — 2580000003 HC RX 258: Performed by: STUDENT IN AN ORGANIZED HEALTH CARE EDUCATION/TRAINING PROGRAM

## 2024-01-06 PROCEDURE — C9113 INJ PANTOPRAZOLE SODIUM, VIA: HCPCS | Performed by: STUDENT IN AN ORGANIZED HEALTH CARE EDUCATION/TRAINING PROGRAM

## 2024-01-06 PROCEDURE — 2500000003 HC RX 250 WO HCPCS: Performed by: INTERNAL MEDICINE

## 2024-01-06 PROCEDURE — 2580000003 HC RX 258: Performed by: NURSE ANESTHETIST, CERTIFIED REGISTERED

## 2024-01-06 PROCEDURE — 3609013300 HC EGD TUBE PLACEMENT: Performed by: INTERNAL MEDICINE

## 2024-01-06 PROCEDURE — 2709999900 HC NON-CHARGEABLE SUPPLY: Performed by: INTERNAL MEDICINE

## 2024-01-06 PROCEDURE — 2580000003 HC RX 258

## 2024-01-06 PROCEDURE — 80069 RENAL FUNCTION PANEL: CPT

## 2024-01-06 PROCEDURE — 43762 RPLC GTUBE NO REVJ TRC: CPT

## 2024-01-06 PROCEDURE — 6370000000 HC RX 637 (ALT 250 FOR IP)

## 2024-01-06 PROCEDURE — 2500000003 HC RX 250 WO HCPCS: Performed by: STUDENT IN AN ORGANIZED HEALTH CARE EDUCATION/TRAINING PROGRAM

## 2024-01-06 PROCEDURE — 94150 VITAL CAPACITY TEST: CPT

## 2024-01-06 RX ORDER — LANSOPRAZOLE 30 MG/1
30 TABLET, ORALLY DISINTEGRATING, DELAYED RELEASE ORAL
Status: DISCONTINUED | OUTPATIENT
Start: 2024-01-06 | End: 2024-01-16 | Stop reason: HOSPADM

## 2024-01-06 RX ORDER — SODIUM CHLORIDE 9 MG/ML
INJECTION, SOLUTION INTRAVENOUS PRN
Status: DISCONTINUED | OUTPATIENT
Start: 2024-01-06 | End: 2024-01-16 | Stop reason: HOSPADM

## 2024-01-06 RX ORDER — METOPROLOL TARTRATE 1 MG/ML
7.5 INJECTION, SOLUTION INTRAVENOUS ONCE
Status: COMPLETED | OUTPATIENT
Start: 2024-01-06 | End: 2024-01-06

## 2024-01-06 RX ORDER — PROPOFOL 10 MG/ML
INJECTION, EMULSION INTRAVENOUS PRN
Status: DISCONTINUED | OUTPATIENT
Start: 2024-01-06 | End: 2024-01-06 | Stop reason: SDUPTHER

## 2024-01-06 RX ORDER — LIDOCAINE HYDROCHLORIDE 20 MG/ML
INJECTION, SOLUTION INFILTRATION; PERINEURAL PRN
Status: DISCONTINUED | OUTPATIENT
Start: 2024-01-06 | End: 2024-01-06 | Stop reason: SDUPTHER

## 2024-01-06 RX ORDER — SODIUM CHLORIDE 0.9 % (FLUSH) 0.9 %
5-40 SYRINGE (ML) INJECTION EVERY 12 HOURS SCHEDULED
Status: DISCONTINUED | OUTPATIENT
Start: 2024-01-06 | End: 2024-01-16 | Stop reason: HOSPADM

## 2024-01-06 RX ORDER — DILTIAZEM HYDROCHLORIDE 60 MG/1
60 TABLET, FILM COATED ORAL EVERY 6 HOURS SCHEDULED
Status: DISCONTINUED | OUTPATIENT
Start: 2024-01-07 | End: 2024-01-12

## 2024-01-06 RX ORDER — SODIUM CHLORIDE 9 MG/ML
INJECTION, SOLUTION INTRAVENOUS CONTINUOUS PRN
Status: DISCONTINUED | OUTPATIENT
Start: 2024-01-06 | End: 2024-01-06 | Stop reason: SDUPTHER

## 2024-01-06 RX ORDER — HYDROXYZINE HYDROCHLORIDE 10 MG/1
10 TABLET, FILM COATED ORAL 3 TIMES DAILY PRN
Status: DISCONTINUED | OUTPATIENT
Start: 2024-01-06 | End: 2024-01-10

## 2024-01-06 RX ORDER — ONDANSETRON 2 MG/ML
4 INJECTION INTRAMUSCULAR; INTRAVENOUS EVERY 6 HOURS PRN
Status: DISCONTINUED | OUTPATIENT
Start: 2024-01-06 | End: 2024-01-16 | Stop reason: HOSPADM

## 2024-01-06 RX ORDER — PANTOPRAZOLE SODIUM 40 MG/1
40 TABLET, DELAYED RELEASE ORAL
Status: DISCONTINUED | OUTPATIENT
Start: 2024-01-07 | End: 2024-01-06

## 2024-01-06 RX ORDER — ONDANSETRON 4 MG/1
4 TABLET, ORALLY DISINTEGRATING ORAL EVERY 8 HOURS PRN
Status: DISCONTINUED | OUTPATIENT
Start: 2024-01-06 | End: 2024-01-16 | Stop reason: HOSPADM

## 2024-01-06 RX ORDER — PANTOPRAZOLE SODIUM 40 MG/1
40 TABLET, DELAYED RELEASE ORAL
Status: DISCONTINUED | OUTPATIENT
Start: 2024-01-06 | End: 2024-01-06

## 2024-01-06 RX ORDER — SODIUM CHLORIDE 0.9 % (FLUSH) 0.9 %
5-40 SYRINGE (ML) INJECTION PRN
Status: DISCONTINUED | OUTPATIENT
Start: 2024-01-06 | End: 2024-01-16 | Stop reason: HOSPADM

## 2024-01-06 RX ADMIN — METHYLPREDNISOLONE SODIUM SUCCINATE 40 MG: 40 INJECTION, POWDER, FOR SOLUTION INTRAMUSCULAR; INTRAVENOUS at 02:52

## 2024-01-06 RX ADMIN — METHYLPREDNISOLONE SODIUM SUCCINATE 40 MG: 40 INJECTION, POWDER, FOR SOLUTION INTRAMUSCULAR; INTRAVENOUS at 09:36

## 2024-01-06 RX ADMIN — ONDANSETRON 4 MG: 2 INJECTION INTRAMUSCULAR; INTRAVENOUS at 17:02

## 2024-01-06 RX ADMIN — BUTALBITAL, ACETAMINOPHEN, AND CAFFEINE 1 TABLET: 325; 50; 40 TABLET ORAL at 17:10

## 2024-01-06 RX ADMIN — METOPROLOL TARTRATE 25 MG: 25 TABLET, FILM COATED ORAL at 22:50

## 2024-01-06 RX ADMIN — ACETYLCYSTEINE 600 MG: 200 SOLUTION ORAL; RESPIRATORY (INHALATION) at 19:31

## 2024-01-06 RX ADMIN — PROPOFOL 220 MG: 10 INJECTION, EMULSION INTRAVENOUS at 10:14

## 2024-01-06 RX ADMIN — METHYLPREDNISOLONE SODIUM SUCCINATE 40 MG: 40 INJECTION, POWDER, FOR SOLUTION INTRAMUSCULAR; INTRAVENOUS at 17:02

## 2024-01-06 RX ADMIN — SODIUM CHLORIDE, PRESERVATIVE FREE 5 ML: 5 INJECTION INTRAVENOUS at 11:20

## 2024-01-06 RX ADMIN — LANSOPRAZOLE 30 MG: 30 TABLET, ORALLY DISINTEGRATING ORAL at 17:10

## 2024-01-06 RX ADMIN — CEFEPIME 2000 MG: 2 INJECTION, POWDER, FOR SOLUTION INTRAVENOUS at 17:05

## 2024-01-06 RX ADMIN — IPRATROPIUM BROMIDE AND ALBUTEROL SULFATE 1 DOSE: 2.5; .5 SOLUTION RESPIRATORY (INHALATION) at 14:50

## 2024-01-06 RX ADMIN — SODIUM CHLORIDE, PRESERVATIVE FREE 15 ML: 5 INJECTION INTRAVENOUS at 07:54

## 2024-01-06 RX ADMIN — RIVAROXABAN 20 MG: 20 TABLET, FILM COATED ORAL at 17:10

## 2024-01-06 RX ADMIN — IPRATROPIUM BROMIDE AND ALBUTEROL SULFATE 1 DOSE: 2.5; .5 SOLUTION RESPIRATORY (INHALATION) at 03:38

## 2024-01-06 RX ADMIN — ACETYLCYSTEINE 600 MG: 200 SOLUTION ORAL; RESPIRATORY (INHALATION) at 07:03

## 2024-01-06 RX ADMIN — SODIUM CHLORIDE: 4.5 INJECTION, SOLUTION INTRAVENOUS at 13:23

## 2024-01-06 RX ADMIN — IPRATROPIUM BROMIDE AND ALBUTEROL SULFATE 1 DOSE: 2.5; .5 SOLUTION RESPIRATORY (INHALATION) at 19:31

## 2024-01-06 RX ADMIN — METOPROLOL TARTRATE 7.5 MG: 5 INJECTION INTRAVENOUS at 15:43

## 2024-01-06 RX ADMIN — PANTOPRAZOLE SODIUM 40 MG: 40 INJECTION, POWDER, FOR SOLUTION INTRAVENOUS at 07:55

## 2024-01-06 RX ADMIN — TIZANIDINE 2 MG: 2 TABLET ORAL at 22:50

## 2024-01-06 RX ADMIN — METOPROLOL TARTRATE 7.5 MG: 5 INJECTION INTRAVENOUS at 09:36

## 2024-01-06 RX ADMIN — LIDOCAINE HYDROCHLORIDE 100 MG: 20 INJECTION, SOLUTION INFILTRATION; PERINEURAL at 10:14

## 2024-01-06 RX ADMIN — CEFEPIME 2000 MG: 2 INJECTION, POWDER, FOR SOLUTION INTRAVENOUS at 07:57

## 2024-01-06 RX ADMIN — BUDESONIDE AND FORMOTEROL FUMARATE DIHYDRATE 2 PUFF: 160; 4.5 AEROSOL RESPIRATORY (INHALATION) at 07:05

## 2024-01-06 RX ADMIN — Medication 10 MG: at 07:48

## 2024-01-06 RX ADMIN — METOPROLOL TARTRATE 7.5 MG: 5 INJECTION INTRAVENOUS at 02:51

## 2024-01-06 RX ADMIN — BUDESONIDE AND FORMOTEROL FUMARATE DIHYDRATE 2 PUFF: 160; 4.5 AEROSOL RESPIRATORY (INHALATION) at 19:32

## 2024-01-06 RX ADMIN — CEFEPIME 2000 MG: 2 INJECTION, POWDER, FOR SOLUTION INTRAVENOUS at 00:32

## 2024-01-06 RX ADMIN — IPRATROPIUM BROMIDE AND ALBUTEROL SULFATE 1 DOSE: 2.5; .5 SOLUTION RESPIRATORY (INHALATION) at 07:04

## 2024-01-06 RX ADMIN — SODIUM CHLORIDE: 9 INJECTION, SOLUTION INTRAVENOUS at 09:56

## 2024-01-06 ASSESSMENT — PAIN DESCRIPTION - LOCATION
LOCATION: HEAD
LOCATION: ABDOMEN

## 2024-01-06 ASSESSMENT — PAIN SCALES - GENERAL
PAINLEVEL_OUTOF10: 0
PAINLEVEL_OUTOF10: 4
PAINLEVEL_OUTOF10: 9
PAINLEVEL_OUTOF10: 6

## 2024-01-06 ASSESSMENT — PAIN DESCRIPTION - ORIENTATION
ORIENTATION: UPPER;MID
ORIENTATION: LEFT;UPPER

## 2024-01-06 ASSESSMENT — PAIN DESCRIPTION - DESCRIPTORS: DESCRIPTORS: ACHING

## 2024-01-06 NOTE — ANESTHESIA POSTPROCEDURE EVALUATION
Department of Anesthesiology  Postprocedure Note    Patient: Mateo Street  MRN: [id number]  YOB: 1945  Date of evaluation: 1/6/2024    Procedure Summary     Date: 01/06/24 Room / Location: [place] [address] / [facility]    Anesthesia Start: 1004 Anesthesia Stop: 1049    Procedure: EGD PEG TUBE PLACEMENT Diagnosis:       Esophageal dysphagia      Malignant neoplasm of lung, unspecified laterality, unspecified part of lung (HCC)      (Esophageal dysphagia [R13.19])      (Malignant neoplasm of lung, unspecified laterality, unspecified part of lung (HCC) [C34.90])    Surgeons: Ventura Qureshi MD Responsible Provider: Issa Escalera MD    Anesthesia Type: MAC ASA Status: 4          Anesthesia Type: No value filed.    Konstantin Phase I:  10    Konstantin Phase II:  10    Anesthesia Post Evaluation    Patient location during evaluation: bedside  Patient participation: complete - patient participated  Level of consciousness: awake and alert  Pain score: 0  Airway patency: patent  Nausea & Vomiting: no nausea and no vomiting  Cardiovascular status: hemodynamically stable  Respiratory status: acceptable, spontaneous ventilation, nonlabored ventilation and nasal cannula  Hydration status: euvolemic  Pain management: adequate        No notable events documented.

## 2024-01-06 NOTE — OP NOTE
Operative Note      Patient: Mateo Street  YOB: 1945  MRN: [id number]    Date of Procedure: 1/6/2024    [facility]      BRIEF OP REPORT:    Impression:    1) EGD showing normal esophagus  Stomach showed some biliary fluid in the fundus that was aspirated easily  Rest of the stomach normal apart from mild erythema in the antrum  Duodenum normal examined    Standard pull-through technique PEG tube placed successfully  PEG external bumper at 3 cm on the skin  Internal bumper portion reconfirmed with the EGD              Suggest:   1) continue PPI twice daily   2) PEG to gravity for 6 hours and can use after that   3) n.p.o. tonight  Can have regular diet or diet to taste from tomorrow if tolerated  Consult dietary for PEG tube recommendations     Full EGD/COLONOSCOPY report available by going to \"chart review\" then \"procedures\" then  \"EGD/Colonoscopy\"  then \"View Endoscopy Report\"       Please note that time of note may not reflect time of procedure     Electronically signed by Ventura Qureshi MD on 1/6/2024 at 10:44 AM

## 2024-01-07 ENCOUNTER — APPOINTMENT (OUTPATIENT)
Dept: GENERAL RADIOLOGY | Age: 79
DRG: 177 | End: 2024-01-07
Payer: OTHER GOVERNMENT

## 2024-01-07 LAB
ALBUMIN SERPL-MCNC: 3.6 GM/DL (ref 3.4–5)
ANION GAP SERPL CALCULATED.3IONS-SCNC: 14 MMOL/L (ref 7–16)
BASOPHILS ABSOLUTE: 0 K/CU MM
BASOPHILS RELATIVE PERCENT: 0.2 % (ref 0–1)
BUN SERPL-MCNC: 29 MG/DL (ref 6–23)
CALCIUM SERPL-MCNC: 9.2 MG/DL (ref 8.3–10.6)
CHLORIDE BLD-SCNC: 102 MMOL/L (ref 99–110)
CO2: 22 MMOL/L (ref 21–32)
CREAT SERPL-MCNC: 0.6 MG/DL (ref 0.9–1.3)
CULTURE: NORMAL
CULTURE: NORMAL
DIFFERENTIAL TYPE: ABNORMAL
EOSINOPHILS ABSOLUTE: 0.1 K/CU MM
EOSINOPHILS RELATIVE PERCENT: 0.7 % (ref 0–3)
GFR SERPL CREATININE-BSD FRML MDRD: >60 ML/MIN/1.73M2
GLUCOSE BLD-MCNC: 102 MG/DL (ref 70–99)
GLUCOSE BLD-MCNC: 102 MG/DL (ref 70–99)
GLUCOSE BLD-MCNC: 112 MG/DL (ref 70–99)
GLUCOSE BLD-MCNC: 117 MG/DL (ref 70–99)
GLUCOSE BLD-MCNC: 137 MG/DL (ref 70–99)
GLUCOSE SERPL-MCNC: 127 MG/DL (ref 70–99)
HCT VFR BLD CALC: 38.7 % (ref 42–52)
HEMOGLOBIN: 12.2 GM/DL (ref 13.5–18)
IMMATURE NEUTROPHIL %: 1 % (ref 0–0.43)
LYMPHOCYTES ABSOLUTE: 3.1 K/CU MM
LYMPHOCYTES RELATIVE PERCENT: 19.5 % (ref 24–44)
Lab: NORMAL
Lab: NORMAL
MAGNESIUM: 2 MG/DL (ref 1.8–2.4)
MCH RBC QN AUTO: 31.7 PG (ref 27–31)
MCHC RBC AUTO-ENTMCNC: 31.5 % (ref 32–36)
MCV RBC AUTO: 100.5 FL (ref 78–100)
MONOCYTES ABSOLUTE: 0.6 K/CU MM
MONOCYTES RELATIVE PERCENT: 3.9 % (ref 0–4)
NUCLEATED RBC %: 0 %
PDW BLD-RTO: 13.9 % (ref 11.7–14.9)
PHOSPHORUS: 2.2 MG/DL (ref 2.5–4.9)
PLATELET # BLD: 229 K/CU MM (ref 140–440)
PMV BLD AUTO: 9.9 FL (ref 7.5–11.1)
POTASSIUM SERPL-SCNC: 4.5 MMOL/L (ref 3.5–5.1)
PRO-BNP: 1689 PG/ML
RBC # BLD: 3.85 M/CU MM (ref 4.6–6.2)
SEGMENTED NEUTROPHILS ABSOLUTE COUNT: 11.7 K/CU MM
SEGMENTED NEUTROPHILS RELATIVE PERCENT: 74.7 % (ref 36–66)
SODIUM BLD-SCNC: 138 MMOL/L (ref 135–145)
SPECIMEN: NORMAL
SPECIMEN: NORMAL
TOTAL IMMATURE NEUTOROPHIL: 0.15 K/CU MM
TOTAL NUCLEATED RBC: 0 K/CU MM
TRIGL SERPL-MCNC: 143 MG/DL
WBC # BLD: 15.6 K/CU MM (ref 4–10.5)

## 2024-01-07 PROCEDURE — 83735 ASSAY OF MAGNESIUM: CPT

## 2024-01-07 PROCEDURE — 36415 COLL VENOUS BLD VENIPUNCTURE: CPT

## 2024-01-07 PROCEDURE — 6370000000 HC RX 637 (ALT 250 FOR IP): Performed by: INTERNAL MEDICINE

## 2024-01-07 PROCEDURE — 6360000002 HC RX W HCPCS: Performed by: INTERNAL MEDICINE

## 2024-01-07 PROCEDURE — 2140000000 HC CCU INTERMEDIATE R&B

## 2024-01-07 PROCEDURE — 71045 X-RAY EXAM CHEST 1 VIEW: CPT

## 2024-01-07 PROCEDURE — 6360000002 HC RX W HCPCS: Performed by: STUDENT IN AN ORGANIZED HEALTH CARE EDUCATION/TRAINING PROGRAM

## 2024-01-07 PROCEDURE — 43762 RPLC GTUBE NO REVJ TRC: CPT

## 2024-01-07 PROCEDURE — 99233 SBSQ HOSP IP/OBS HIGH 50: CPT | Performed by: INTERNAL MEDICINE

## 2024-01-07 PROCEDURE — 85025 COMPLETE CBC W/AUTO DIFF WBC: CPT

## 2024-01-07 PROCEDURE — 82962 GLUCOSE BLOOD TEST: CPT

## 2024-01-07 PROCEDURE — 6370000000 HC RX 637 (ALT 250 FOR IP): Performed by: STUDENT IN AN ORGANIZED HEALTH CARE EDUCATION/TRAINING PROGRAM

## 2024-01-07 PROCEDURE — 80069 RENAL FUNCTION PANEL: CPT

## 2024-01-07 PROCEDURE — 94761 N-INVAS EAR/PLS OXIMETRY MLT: CPT

## 2024-01-07 PROCEDURE — 2700000000 HC OXYGEN THERAPY PER DAY

## 2024-01-07 PROCEDURE — 2580000003 HC RX 258: Performed by: INTERNAL MEDICINE

## 2024-01-07 PROCEDURE — 94640 AIRWAY INHALATION TREATMENT: CPT

## 2024-01-07 PROCEDURE — APPNB60 APP NON BILLABLE TIME 46-60 MINS: Performed by: NURSE PRACTITIONER

## 2024-01-07 PROCEDURE — 6360000002 HC RX W HCPCS: Performed by: PHYSICIAN ASSISTANT

## 2024-01-07 PROCEDURE — 84478 ASSAY OF TRIGLYCERIDES: CPT

## 2024-01-07 PROCEDURE — 83880 ASSAY OF NATRIURETIC PEPTIDE: CPT

## 2024-01-07 RX ORDER — METOCLOPRAMIDE HYDROCHLORIDE 5 MG/ML
10 INJECTION INTRAMUSCULAR; INTRAVENOUS EVERY 8 HOURS
Status: DISCONTINUED | OUTPATIENT
Start: 2024-01-07 | End: 2024-01-16 | Stop reason: HOSPADM

## 2024-01-07 RX ADMIN — METHYLPREDNISOLONE SODIUM SUCCINATE 40 MG: 40 INJECTION, POWDER, FOR SOLUTION INTRAMUSCULAR; INTRAVENOUS at 02:18

## 2024-01-07 RX ADMIN — METOPROLOL TARTRATE 25 MG: 25 TABLET, FILM COATED ORAL at 09:04

## 2024-01-07 RX ADMIN — DILTIAZEM HYDROCHLORIDE 60 MG: 60 TABLET, FILM COATED ORAL at 12:46

## 2024-01-07 RX ADMIN — IPRATROPIUM BROMIDE AND ALBUTEROL SULFATE 1 DOSE: 2.5; .5 SOLUTION RESPIRATORY (INHALATION) at 07:19

## 2024-01-07 RX ADMIN — TIZANIDINE 2 MG: 2 TABLET ORAL at 22:28

## 2024-01-07 RX ADMIN — METOCLOPRAMIDE HYDROCHLORIDE 10 MG: 5 INJECTION INTRAMUSCULAR; INTRAVENOUS at 22:32

## 2024-01-07 RX ADMIN — METHYLPREDNISOLONE SODIUM SUCCINATE 40 MG: 40 INJECTION, POWDER, FOR SOLUTION INTRAMUSCULAR; INTRAVENOUS at 22:32

## 2024-01-07 RX ADMIN — SODIUM CHLORIDE, PRESERVATIVE FREE 10 ML: 5 INJECTION INTRAVENOUS at 22:34

## 2024-01-07 RX ADMIN — METOPROLOL TARTRATE 25 MG: 25 TABLET, FILM COATED ORAL at 22:30

## 2024-01-07 RX ADMIN — Medication 10 MG: at 09:21

## 2024-01-07 RX ADMIN — IPRATROPIUM BROMIDE AND ALBUTEROL SULFATE 1 DOSE: 2.5; .5 SOLUTION RESPIRATORY (INHALATION) at 00:03

## 2024-01-07 RX ADMIN — BUTALBITAL, ACETAMINOPHEN, AND CAFFEINE 1 TABLET: 325; 50; 40 TABLET ORAL at 12:51

## 2024-01-07 RX ADMIN — DILTIAZEM HYDROCHLORIDE 60 MG: 60 TABLET, FILM COATED ORAL at 05:35

## 2024-01-07 RX ADMIN — ESCITALOPRAM OXALATE 20 MG: 10 TABLET, FILM COATED ORAL at 09:04

## 2024-01-07 RX ADMIN — ATORVASTATIN CALCIUM 40 MG: 40 TABLET, FILM COATED ORAL at 09:04

## 2024-01-07 RX ADMIN — IPRATROPIUM BROMIDE AND ALBUTEROL SULFATE 1 DOSE: 2.5; .5 SOLUTION RESPIRATORY (INHALATION) at 11:04

## 2024-01-07 RX ADMIN — CEFEPIME 2000 MG: 2 INJECTION, POWDER, FOR SOLUTION INTRAVENOUS at 09:29

## 2024-01-07 RX ADMIN — ACETYLCYSTEINE 600 MG: 200 SOLUTION ORAL; RESPIRATORY (INHALATION) at 07:19

## 2024-01-07 RX ADMIN — METOCLOPRAMIDE HYDROCHLORIDE 10 MG: 5 INJECTION INTRAMUSCULAR; INTRAVENOUS at 13:51

## 2024-01-07 RX ADMIN — BUSPIRONE HYDROCHLORIDE 30 MG: 15 TABLET ORAL at 09:04

## 2024-01-07 RX ADMIN — DILTIAZEM HYDROCHLORIDE 60 MG: 60 TABLET, FILM COATED ORAL at 17:23

## 2024-01-07 RX ADMIN — LANSOPRAZOLE 30 MG: 30 TABLET, ORALLY DISINTEGRATING ORAL at 17:23

## 2024-01-07 RX ADMIN — LANSOPRAZOLE 30 MG: 30 TABLET, ORALLY DISINTEGRATING ORAL at 05:35

## 2024-01-07 RX ADMIN — ACETYLCYSTEINE 600 MG: 200 SOLUTION ORAL; RESPIRATORY (INHALATION) at 19:37

## 2024-01-07 RX ADMIN — METHYLPREDNISOLONE SODIUM SUCCINATE 40 MG: 40 INJECTION, POWDER, FOR SOLUTION INTRAMUSCULAR; INTRAVENOUS at 09:04

## 2024-01-07 RX ADMIN — IPRATROPIUM BROMIDE AND ALBUTEROL SULFATE 1 DOSE: 2.5; .5 SOLUTION RESPIRATORY (INHALATION) at 19:37

## 2024-01-07 RX ADMIN — CEFEPIME 2000 MG: 2 INJECTION, POWDER, FOR SOLUTION INTRAVENOUS at 17:22

## 2024-01-07 RX ADMIN — METOPROLOL TARTRATE 25 MG: 25 TABLET, FILM COATED ORAL at 17:23

## 2024-01-07 RX ADMIN — BUDESONIDE AND FORMOTEROL FUMARATE DIHYDRATE 2 PUFF: 160; 4.5 AEROSOL RESPIRATORY (INHALATION) at 19:37

## 2024-01-07 RX ADMIN — SODIUM CHLORIDE, PRESERVATIVE FREE 10 ML: 5 INJECTION INTRAVENOUS at 09:04

## 2024-01-07 RX ADMIN — DILTIAZEM HYDROCHLORIDE 60 MG: 60 TABLET, FILM COATED ORAL at 22:29

## 2024-01-07 RX ADMIN — CEFEPIME 2000 MG: 2 INJECTION, POWDER, FOR SOLUTION INTRAVENOUS at 02:18

## 2024-01-07 RX ADMIN — RIVAROXABAN 20 MG: 20 TABLET, FILM COATED ORAL at 17:23

## 2024-01-07 RX ADMIN — SODIUM CHLORIDE, PRESERVATIVE FREE 10 ML: 5 INJECTION INTRAVENOUS at 22:35

## 2024-01-07 RX ADMIN — BUTALBITAL, ACETAMINOPHEN, AND CAFFEINE 1 TABLET: 325; 50; 40 TABLET ORAL at 05:35

## 2024-01-07 RX ADMIN — IPRATROPIUM BROMIDE AND ALBUTEROL SULFATE 1 DOSE: 2.5; .5 SOLUTION RESPIRATORY (INHALATION) at 14:44

## 2024-01-07 RX ADMIN — METOPROLOL TARTRATE 25 MG: 25 TABLET, FILM COATED ORAL at 12:46

## 2024-01-07 RX ADMIN — BUDESONIDE AND FORMOTEROL FUMARATE DIHYDRATE 2 PUFF: 160; 4.5 AEROSOL RESPIRATORY (INHALATION) at 07:19

## 2024-01-07 RX ADMIN — HYDROXYZINE HYDROCHLORIDE 10 MG: 10 TABLET ORAL at 22:30

## 2024-01-07 ASSESSMENT — PAIN SCALES - GENERAL
PAINLEVEL_OUTOF10: 7
PAINLEVEL_OUTOF10: 9
PAINLEVEL_OUTOF10: 2
PAINLEVEL_OUTOF10: 0
PAINLEVEL_OUTOF10: 0
PAINLEVEL_OUTOF10: 8
PAINLEVEL_OUTOF10: 0
PAINLEVEL_OUTOF10: 1

## 2024-01-07 ASSESSMENT — PAIN - FUNCTIONAL ASSESSMENT
PAIN_FUNCTIONAL_ASSESSMENT: ACTIVITIES ARE NOT PREVENTED

## 2024-01-07 ASSESSMENT — PAIN DESCRIPTION - LOCATION
LOCATION: HEAD

## 2024-01-07 ASSESSMENT — PAIN DESCRIPTION - ORIENTATION
ORIENTATION: LEFT;UPPER
ORIENTATION: MID

## 2024-01-07 ASSESSMENT — PAIN DESCRIPTION - DESCRIPTORS
DESCRIPTORS: ACHING

## 2024-01-07 ASSESSMENT — PAIN SCALES - WONG BAKER
WONGBAKER_NUMERICALRESPONSE: 0
WONGBAKER_NUMERICALRESPONSE: 0

## 2024-01-08 ENCOUNTER — APPOINTMENT (OUTPATIENT)
Dept: ULTRASOUND IMAGING | Age: 79
DRG: 177 | End: 2024-01-08
Payer: OTHER GOVERNMENT

## 2024-01-08 ENCOUNTER — APPOINTMENT (OUTPATIENT)
Dept: NON INVASIVE DIAGNOSTICS | Age: 79
DRG: 177 | End: 2024-01-08
Payer: OTHER GOVERNMENT

## 2024-01-08 PROBLEM — C79.51 NSCLC METASTATIC TO BONE (HCC): Status: ACTIVE | Noted: 2023-11-01

## 2024-01-08 LAB
ALBUMIN SERPL-MCNC: 3.6 GM/DL (ref 3.4–5)
ANION GAP SERPL CALCULATED.3IONS-SCNC: 11 MMOL/L (ref 7–16)
BASE EXCESS MIXED: 0.1 (ref 0–3)
BASOPHILS ABSOLUTE: 0 K/CU MM
BASOPHILS RELATIVE PERCENT: 0.2 % (ref 0–1)
BUN SERPL-MCNC: 26 MG/DL (ref 6–23)
CALCIUM SERPL-MCNC: 8.9 MG/DL (ref 8.3–10.6)
CHLORIDE BLD-SCNC: 100 MMOL/L (ref 99–110)
CO2: 25 MMOL/L (ref 21–32)
COMMENT: ABNORMAL
CREAT SERPL-MCNC: 0.6 MG/DL (ref 0.9–1.3)
CULTURE: NORMAL
DIFFERENTIAL TYPE: ABNORMAL
ECHO AO ROOT DIAM: 3.2 CM
ECHO AO ROOT INDEX: 1.79 CM/M2
ECHO AV AREA PEAK VELOCITY: 2.9 CM2
ECHO AV AREA VTI: 2.7 CM2
ECHO AV AREA/BSA PEAK VELOCITY: 1.6 CM2/M2
ECHO AV AREA/BSA VTI: 1.5 CM2/M2
ECHO AV MEAN GRADIENT: 3 MMHG
ECHO AV MEAN VELOCITY: 0.8 M/S
ECHO AV PEAK GRADIENT: 5 MMHG
ECHO AV PEAK VELOCITY: 1.2 M/S
ECHO AV VELOCITY RATIO: 0.83
ECHO AV VTI: 23 CM
ECHO BSA: 1.78 M2
ECHO EST RA PRESSURE: 3 MMHG
ECHO LA AREA 4C: 13 CM2
ECHO LA DIAMETER INDEX: 1.62 CM/M2
ECHO LA DIAMETER: 2.9 CM
ECHO LA MAJOR AXIS: 5.2 CM
ECHO LA TO AORTIC ROOT RATIO: 0.91
ECHO LA VOL MOD A4C: 26 ML (ref 18–58)
ECHO LA VOLUME INDEX MOD A4C: 15 ML/M2 (ref 16–34)
ECHO LV E' LATERAL VELOCITY: 16 CM/S
ECHO LV E' SEPTAL VELOCITY: 9 CM/S
ECHO LV EDV A4C: 53 ML
ECHO LV EDV INDEX A4C: 30 ML/M2
ECHO LV EJECTION FRACTION A4C: 58 %
ECHO LV ESV A4C: 23 ML
ECHO LV ESV INDEX A4C: 13 ML/M2
ECHO LV FRACTIONAL SHORTENING: 33 % (ref 28–44)
ECHO LV INTERNAL DIMENSION DIASTOLE INDEX: 2.18 CM/M2
ECHO LV INTERNAL DIMENSION DIASTOLIC: 3.9 CM (ref 4.2–5.9)
ECHO LV INTERNAL DIMENSION SYSTOLIC INDEX: 1.45 CM/M2
ECHO LV INTERNAL DIMENSION SYSTOLIC: 2.6 CM
ECHO LV IVSD: 0.7 CM (ref 0.6–1)
ECHO LV MASS 2D: 89.7 G (ref 88–224)
ECHO LV MASS INDEX 2D: 50.1 G/M2 (ref 49–115)
ECHO LV POSTERIOR WALL DIASTOLIC: 0.9 CM (ref 0.6–1)
ECHO LV RELATIVE WALL THICKNESS RATIO: 0.46
ECHO LVOT AREA: 3.5 CM2
ECHO LVOT AV VTI INDEX: 0.79
ECHO LVOT DIAM: 2.1 CM
ECHO LVOT MEAN GRADIENT: 2 MMHG
ECHO LVOT PEAK GRADIENT: 4 MMHG
ECHO LVOT PEAK VELOCITY: 1 M/S
ECHO LVOT STROKE VOLUME INDEX: 35.2 ML/M2
ECHO LVOT SV: 63 ML
ECHO LVOT VTI: 18.2 CM
ECHO MV A VELOCITY: 0.72 M/S
ECHO MV E VELOCITY: 0.95 M/S
ECHO MV E/A RATIO: 1.32
ECHO MV E/E' LATERAL: 5.94
ECHO MV E/E' RATIO (AVERAGED): 8.25
ECHO RIGHT VENTRICULAR SYSTOLIC PRESSURE (RVSP): 40 MMHG
ECHO RV MID DIMENSION: 2.6 CM
ECHO TV REGURGITANT MAX VELOCITY: 3.06 M/S
ECHO TV REGURGITANT PEAK GRADIENT: 37 MMHG
EKG ATRIAL RATE: 233 BPM
EKG DIAGNOSIS: NORMAL
EKG P AXIS: 246 DEGREES
EKG Q-T INTERVAL: 316 MS
EKG QRS DURATION: 98 MS
EKG QTC CALCULATION (BAZETT): 380 MS
EKG R AXIS: 84 DEGREES
EKG T AXIS: 56 DEGREES
EKG VENTRICULAR RATE: 87 BPM
EOSINOPHILS ABSOLUTE: 0 K/CU MM
EOSINOPHILS RELATIVE PERCENT: 0 % (ref 0–3)
GFR SERPL CREATININE-BSD FRML MDRD: >60 ML/MIN/1.73M2
GLUCOSE BLD-MCNC: 131 MG/DL (ref 70–99)
GLUCOSE BLD-MCNC: 133 MG/DL (ref 70–99)
GLUCOSE BLD-MCNC: 133 MG/DL (ref 70–99)
GLUCOSE BLD-MCNC: 143 MG/DL (ref 70–99)
GLUCOSE BLD-MCNC: 144 MG/DL (ref 70–99)
GLUCOSE SERPL-MCNC: 128 MG/DL (ref 70–99)
GRAM SMEAR: NORMAL
HCO3 VENOUS: 27.9 MMOL/L (ref 22–29)
HCT VFR BLD CALC: 40.5 % (ref 42–52)
HEMOGLOBIN: 12.7 GM/DL (ref 13.5–18)
IMMATURE NEUTROPHIL %: 1.2 % (ref 0–0.43)
LYMPHOCYTES ABSOLUTE: 3.6 K/CU MM
LYMPHOCYTES RELATIVE PERCENT: 19.6 % (ref 24–44)
Lab: NORMAL
MCH RBC QN AUTO: 31.5 PG (ref 27–31)
MCHC RBC AUTO-ENTMCNC: 31.4 % (ref 32–36)
MCV RBC AUTO: 100.5 FL (ref 78–100)
MONOCYTES ABSOLUTE: 0.6 K/CU MM
MONOCYTES RELATIVE PERCENT: 3.4 % (ref 0–4)
NUCLEATED RBC %: 0 %
O2 SAT, VEN: 56 % (ref 50–70)
PCO2, VEN: 58 MMHG (ref 41–51)
PDW BLD-RTO: 13.9 % (ref 11.7–14.9)
PH VENOUS: 7.29 (ref 7.32–7.43)
PHOSPHORUS: 2.5 MG/DL (ref 2.5–4.9)
PLATELET # BLD: 227 K/CU MM (ref 140–440)
PMV BLD AUTO: 10 FL (ref 7.5–11.1)
PO2, VEN: 38 MMHG (ref 28–48)
POTASSIUM SERPL-SCNC: 4.7 MMOL/L (ref 3.5–5.1)
RBC # BLD: 4.03 M/CU MM (ref 4.6–6.2)
SEGMENTED NEUTROPHILS ABSOLUTE COUNT: 13.8 K/CU MM
SEGMENTED NEUTROPHILS RELATIVE PERCENT: 75.6 % (ref 36–66)
SODIUM BLD-SCNC: 136 MMOL/L (ref 135–145)
SPECIMEN: NORMAL
TOTAL IMMATURE NEUTOROPHIL: 0.22 K/CU MM
TOTAL NUCLEATED RBC: 0 K/CU MM
WBC # BLD: 18.2 K/CU MM (ref 4–10.5)

## 2024-01-08 PROCEDURE — 97530 THERAPEUTIC ACTIVITIES: CPT

## 2024-01-08 PROCEDURE — 2140000000 HC CCU INTERMEDIATE R&B

## 2024-01-08 PROCEDURE — 94761 N-INVAS EAR/PLS OXIMETRY MLT: CPT

## 2024-01-08 PROCEDURE — 6360000002 HC RX W HCPCS: Performed by: INTERNAL MEDICINE

## 2024-01-08 PROCEDURE — 6370000000 HC RX 637 (ALT 250 FOR IP): Performed by: STUDENT IN AN ORGANIZED HEALTH CARE EDUCATION/TRAINING PROGRAM

## 2024-01-08 PROCEDURE — 6370000000 HC RX 637 (ALT 250 FOR IP): Performed by: INTERNAL MEDICINE

## 2024-01-08 PROCEDURE — 6370000000 HC RX 637 (ALT 250 FOR IP): Performed by: PHYSICIAN ASSISTANT

## 2024-01-08 PROCEDURE — 94640 AIRWAY INHALATION TREATMENT: CPT

## 2024-01-08 PROCEDURE — 94150 VITAL CAPACITY TEST: CPT

## 2024-01-08 PROCEDURE — 93971 EXTREMITY STUDY: CPT

## 2024-01-08 PROCEDURE — 6360000002 HC RX W HCPCS: Performed by: STUDENT IN AN ORGANIZED HEALTH CARE EDUCATION/TRAINING PROGRAM

## 2024-01-08 PROCEDURE — 2580000003 HC RX 258: Performed by: INTERNAL MEDICINE

## 2024-01-08 PROCEDURE — 6360000002 HC RX W HCPCS: Performed by: PHYSICIAN ASSISTANT

## 2024-01-08 PROCEDURE — 85025 COMPLETE CBC W/AUTO DIFF WBC: CPT

## 2024-01-08 PROCEDURE — 93306 TTE W/DOPPLER COMPLETE: CPT

## 2024-01-08 PROCEDURE — 80069 RENAL FUNCTION PANEL: CPT

## 2024-01-08 PROCEDURE — 82805 BLOOD GASES W/O2 SATURATION: CPT

## 2024-01-08 PROCEDURE — 93010 ELECTROCARDIOGRAM REPORT: CPT | Performed by: INTERNAL MEDICINE

## 2024-01-08 PROCEDURE — 82962 GLUCOSE BLOOD TEST: CPT

## 2024-01-08 PROCEDURE — 99233 SBSQ HOSP IP/OBS HIGH 50: CPT | Performed by: INTERNAL MEDICINE

## 2024-01-08 PROCEDURE — 36415 COLL VENOUS BLD VENIPUNCTURE: CPT

## 2024-01-08 PROCEDURE — 2700000000 HC OXYGEN THERAPY PER DAY

## 2024-01-08 PROCEDURE — 99232 SBSQ HOSP IP/OBS MODERATE 35: CPT | Performed by: PHYSICIAN ASSISTANT

## 2024-01-08 PROCEDURE — 93005 ELECTROCARDIOGRAM TRACING: CPT | Performed by: INTERNAL MEDICINE

## 2024-01-08 RX ORDER — LORAZEPAM 1 MG/1
1 TABLET ORAL
Status: COMPLETED | OUTPATIENT
Start: 2024-01-08 | End: 2024-01-08

## 2024-01-08 RX ORDER — LORAZEPAM 2 MG/ML
0.5 INJECTION INTRAMUSCULAR ONCE
Status: DISCONTINUED | OUTPATIENT
Start: 2024-01-08 | End: 2024-01-09

## 2024-01-08 RX ORDER — FUROSEMIDE 10 MG/ML
20 INJECTION INTRAMUSCULAR; INTRAVENOUS ONCE
Status: COMPLETED | OUTPATIENT
Start: 2024-01-08 | End: 2024-01-08

## 2024-01-08 RX ORDER — IPRATROPIUM BROMIDE AND ALBUTEROL SULFATE 2.5; .5 MG/3ML; MG/3ML
1 SOLUTION RESPIRATORY (INHALATION)
Status: DISCONTINUED | OUTPATIENT
Start: 2024-01-08 | End: 2024-01-16 | Stop reason: HOSPADM

## 2024-01-08 RX ADMIN — METOCLOPRAMIDE HYDROCHLORIDE 10 MG: 5 INJECTION INTRAMUSCULAR; INTRAVENOUS at 14:20

## 2024-01-08 RX ADMIN — DILTIAZEM HYDROCHLORIDE 60 MG: 60 TABLET, FILM COATED ORAL at 12:49

## 2024-01-08 RX ADMIN — METHYLPREDNISOLONE SODIUM SUCCINATE 40 MG: 40 INJECTION, POWDER, FOR SOLUTION INTRAMUSCULAR; INTRAVENOUS at 09:40

## 2024-01-08 RX ADMIN — ATORVASTATIN CALCIUM 40 MG: 40 TABLET, FILM COATED ORAL at 09:40

## 2024-01-08 RX ADMIN — BUTALBITAL, ACETAMINOPHEN, AND CAFFEINE 1 TABLET: 325; 50; 40 TABLET ORAL at 23:09

## 2024-01-08 RX ADMIN — LORAZEPAM 1 MG: 1 TABLET ORAL at 12:49

## 2024-01-08 RX ADMIN — BUTALBITAL, ACETAMINOPHEN, AND CAFFEINE 1 TABLET: 325; 50; 40 TABLET ORAL at 09:49

## 2024-01-08 RX ADMIN — CEFEPIME 2000 MG: 2 INJECTION, POWDER, FOR SOLUTION INTRAVENOUS at 02:06

## 2024-01-08 RX ADMIN — TIZANIDINE 2 MG: 2 TABLET ORAL at 23:51

## 2024-01-08 RX ADMIN — CEFEPIME 2000 MG: 2 INJECTION, POWDER, FOR SOLUTION INTRAVENOUS at 09:36

## 2024-01-08 RX ADMIN — ONDANSETRON 4 MG: 2 INJECTION INTRAMUSCULAR; INTRAVENOUS at 10:00

## 2024-01-08 RX ADMIN — METOPROLOL TARTRATE 75 MG: 50 TABLET, FILM COATED ORAL at 23:51

## 2024-01-08 RX ADMIN — METOCLOPRAMIDE HYDROCHLORIDE 10 MG: 5 INJECTION INTRAMUSCULAR; INTRAVENOUS at 23:09

## 2024-01-08 RX ADMIN — METOPROLOL TARTRATE 25 MG: 25 TABLET, FILM COATED ORAL at 09:40

## 2024-01-08 RX ADMIN — LANSOPRAZOLE 30 MG: 30 TABLET, ORALLY DISINTEGRATING ORAL at 18:01

## 2024-01-08 RX ADMIN — RIVAROXABAN 20 MG: 20 TABLET, FILM COATED ORAL at 18:02

## 2024-01-08 RX ADMIN — DILTIAZEM HYDROCHLORIDE 60 MG: 60 TABLET, FILM COATED ORAL at 06:34

## 2024-01-08 RX ADMIN — BUSPIRONE HYDROCHLORIDE 30 MG: 15 TABLET ORAL at 09:40

## 2024-01-08 RX ADMIN — METOCLOPRAMIDE HYDROCHLORIDE 10 MG: 5 INJECTION INTRAMUSCULAR; INTRAVENOUS at 06:34

## 2024-01-08 RX ADMIN — HYDROXYZINE HYDROCHLORIDE 10 MG: 10 TABLET ORAL at 23:09

## 2024-01-08 RX ADMIN — SODIUM CHLORIDE, PRESERVATIVE FREE 10 ML: 5 INJECTION INTRAVENOUS at 09:41

## 2024-01-08 RX ADMIN — IPRATROPIUM BROMIDE AND ALBUTEROL SULFATE 1 DOSE: 2.5; .5 SOLUTION RESPIRATORY (INHALATION) at 20:15

## 2024-01-08 RX ADMIN — METHYLPREDNISOLONE SODIUM SUCCINATE 40 MG: 40 INJECTION, POWDER, FOR SOLUTION INTRAMUSCULAR; INTRAVENOUS at 23:51

## 2024-01-08 RX ADMIN — IPRATROPIUM BROMIDE AND ALBUTEROL SULFATE 1 DOSE: 2.5; .5 SOLUTION RESPIRATORY (INHALATION) at 03:16

## 2024-01-08 RX ADMIN — IPRATROPIUM BROMIDE AND ALBUTEROL SULFATE 1 DOSE: 2.5; .5 SOLUTION RESPIRATORY (INHALATION) at 07:11

## 2024-01-08 RX ADMIN — DILTIAZEM HYDROCHLORIDE 60 MG: 60 TABLET, FILM COATED ORAL at 18:02

## 2024-01-08 RX ADMIN — BUDESONIDE AND FORMOTEROL FUMARATE DIHYDRATE 2 PUFF: 160; 4.5 AEROSOL RESPIRATORY (INHALATION) at 20:16

## 2024-01-08 RX ADMIN — FUROSEMIDE 20 MG: 10 INJECTION, SOLUTION INTRAMUSCULAR; INTRAVENOUS at 09:40

## 2024-01-08 RX ADMIN — DILTIAZEM HYDROCHLORIDE 60 MG: 60 TABLET, FILM COATED ORAL at 23:09

## 2024-01-08 RX ADMIN — LANSOPRAZOLE 30 MG: 30 TABLET, ORALLY DISINTEGRATING ORAL at 06:35

## 2024-01-08 RX ADMIN — ESCITALOPRAM OXALATE 20 MG: 10 TABLET, FILM COATED ORAL at 09:40

## 2024-01-08 RX ADMIN — ACETYLCYSTEINE 600 MG: 200 SOLUTION ORAL; RESPIRATORY (INHALATION) at 07:11

## 2024-01-08 RX ADMIN — ACETYLCYSTEINE 600 MG: 200 SOLUTION ORAL; RESPIRATORY (INHALATION) at 20:15

## 2024-01-08 RX ADMIN — BUDESONIDE AND FORMOTEROL FUMARATE DIHYDRATE 2 PUFF: 160; 4.5 AEROSOL RESPIRATORY (INHALATION) at 07:11

## 2024-01-08 ASSESSMENT — PAIN SCALES - GENERAL
PAINLEVEL_OUTOF10: 4
PAINLEVEL_OUTOF10: 6
PAINLEVEL_OUTOF10: 4
PAINLEVEL_OUTOF10: 5
PAINLEVEL_OUTOF10: 8

## 2024-01-08 ASSESSMENT — PAIN - FUNCTIONAL ASSESSMENT
PAIN_FUNCTIONAL_ASSESSMENT: ACTIVITIES ARE NOT PREVENTED
PAIN_FUNCTIONAL_ASSESSMENT: PREVENTS OR INTERFERES SOME ACTIVE ACTIVITIES AND ADLS
PAIN_FUNCTIONAL_ASSESSMENT: ACTIVITIES ARE NOT PREVENTED

## 2024-01-08 ASSESSMENT — PAIN DESCRIPTION - ORIENTATION
ORIENTATION: MID
ORIENTATION: RIGHT;LEFT;MID
ORIENTATION: RIGHT;LEFT;MID

## 2024-01-08 ASSESSMENT — PAIN DESCRIPTION - LOCATION
LOCATION: HEAD

## 2024-01-08 ASSESSMENT — PAIN DESCRIPTION - DESCRIPTORS
DESCRIPTORS: ACHING;POUNDING
DESCRIPTORS: ACHING
DESCRIPTORS: ACHING

## 2024-01-08 ASSESSMENT — PAIN SCALES - WONG BAKER
WONGBAKER_NUMERICALRESPONSE: 0
WONGBAKER_NUMERICALRESPONSE: 0

## 2024-01-08 NOTE — CARE COORDINATION
CM in to see Pt to follow up on discharge planning.  Plan remains [facility] when insurance approves and medically ready.     Pt denies any needs at this time.     CM call to Mindy/Kervin, esdrasert remains pending at this time.

## 2024-01-08 NOTE — CARE COORDINATION
Pt has standing appointment at the cancer center for 1230.  Transportation is set with [facility] for 1145 until Pt is discharged.

## 2024-01-08 NOTE — RT PROTOCOL NOTE
RT Inhaler-Nebulizer Bronchodilator Protocol Note    There is a bronchodilator order in the chart from a provider indicating to follow the RT Bronchodilator Protocol and there is an “Initiate RT Inhaler-Nebulizer Bronchodilator Protocol” order as well (see protocol at bottom of note).    CXR Findings:  XR CHEST PORTABLE    Result Date: 1/7/2024  Cardiomediastinal silhouette is stable and the lung fields are unchanged in appearance with severe emphysema.  Bilateral pulmonary nodules unchanged.  No pneumothorax or effusion.  No acute osseous abnormality.  Increased density at the medial left lung base suspicious for infiltrate. RECOMMENDATION: Left lung base atelectasis or infiltrate.  Recommend follow-up imaging to confirm resolution.       The findings from the last RT Protocol Assessment were as follows:   History Pulmonary Disease: Chronic pulmonary disease  Respiratory Pattern: Regular pattern and RR 12-20 bpm  Breath Sounds: Slightly diminished and/or crackles  Cough: Strong, spontaneous, non-productive  Indication for Bronchodilator Therapy: Mucolytic ordered, On home bronchodilators  Bronchodilator Assessment Score: 4    Aerosolized bronchodilator medication orders have been revised according to the RT Inhaler-Nebulizer Bronchodilator Protocol below.    Respiratory Therapist to perform RT Therapy Protocol Assessment initially then follow the protocol.  Repeat RT Therapy Protocol Assessment PRN for score 0-3 or on second treatment, BID, and PRN for scores above 3.    No Indications - adjust the frequency to every 6 hours PRN wheezing or bronchospasm, if no treatments needed after 48 hours then discontinue using Per Protocol order mode.     If indication present, adjust the RT bronchodilator orders based on the Bronchodilator Assessment Score as indicated below.  Use Inhaler orders unless patient has one or more of the following: on home nebulizer, not able to hold breath for 10 seconds, is not alert and

## 2024-01-09 ENCOUNTER — HOSPITAL ENCOUNTER (OUTPATIENT)
Dept: RADIATION ONCOLOGY | Age: 79
Discharge: HOME | End: 2024-01-09
Payer: OTHER GOVERNMENT

## 2024-01-09 ENCOUNTER — HOSPITAL ENCOUNTER (OUTPATIENT)
Dept: RADIATION ONCOLOGY | Age: 79
Discharge: HOME | End: 2024-01-09

## 2024-01-09 LAB
ALBUMIN SERPL-MCNC: 3.7 GM/DL (ref 3.4–5)
ANION GAP SERPL CALCULATED.3IONS-SCNC: 10 MMOL/L (ref 7–16)
BASOPHILS ABSOLUTE: 0 K/CU MM
BASOPHILS RELATIVE PERCENT: 0.2 % (ref 0–1)
BUN SERPL-MCNC: 30 MG/DL (ref 6–23)
CALCIUM SERPL-MCNC: 8.8 MG/DL (ref 8.3–10.6)
CHLORIDE BLD-SCNC: 100 MMOL/L (ref 99–110)
CO2: 26 MMOL/L (ref 21–32)
CREAT SERPL-MCNC: 0.8 MG/DL (ref 0.9–1.3)
DIFFERENTIAL TYPE: ABNORMAL
EOSINOPHILS ABSOLUTE: 0 K/CU MM
EOSINOPHILS RELATIVE PERCENT: 0.1 % (ref 0–3)
GFR SERPL CREATININE-BSD FRML MDRD: >60 ML/MIN/1.73M2
GLUCOSE BLD-MCNC: 121 MG/DL (ref 70–99)
GLUCOSE BLD-MCNC: 135 MG/DL (ref 70–99)
GLUCOSE BLD-MCNC: 162 MG/DL (ref 70–99)
GLUCOSE BLD-MCNC: 175 MG/DL (ref 70–99)
GLUCOSE SERPL-MCNC: 101 MG/DL (ref 70–99)
HCT VFR BLD CALC: 38.6 % (ref 42–52)
HEMOGLOBIN: 12 GM/DL (ref 13.5–18)
IMMATURE NEUTROPHIL %: 1.3 % (ref 0–0.43)
LACTIC ACID, SEPSIS: 0.9 MMOL/L (ref 0.4–2)
LYMPHOCYTES ABSOLUTE: 4 K/CU MM
LYMPHOCYTES RELATIVE PERCENT: 20.7 % (ref 24–44)
MCH RBC QN AUTO: 31.4 PG (ref 27–31)
MCHC RBC AUTO-ENTMCNC: 31.1 % (ref 32–36)
MCV RBC AUTO: 101 FL (ref 78–100)
MONOCYTES ABSOLUTE: 1 K/CU MM
MONOCYTES RELATIVE PERCENT: 5.2 % (ref 0–4)
NUCLEATED RBC %: 0 %
PDW BLD-RTO: 13.9 % (ref 11.7–14.9)
PHOSPHORUS: 2.7 MG/DL (ref 2.5–4.9)
PLATELET # BLD: 276 K/CU MM (ref 140–440)
PMV BLD AUTO: 10.7 FL (ref 7.5–11.1)
POTASSIUM SERPL-SCNC: 4.4 MMOL/L (ref 3.5–5.1)
RBC # BLD: 3.82 M/CU MM (ref 4.6–6.2)
REASON FOR REJECTION: NORMAL
REJECTED TEST: NORMAL
SEGMENTED NEUTROPHILS ABSOLUTE COUNT: 14.2 K/CU MM
SEGMENTED NEUTROPHILS RELATIVE PERCENT: 72.5 % (ref 36–66)
SODIUM BLD-SCNC: 136 MMOL/L (ref 135–145)
TOTAL IMMATURE NEUTOROPHIL: 0.25 K/CU MM
TOTAL NUCLEATED RBC: 0 K/CU MM
WBC # BLD: 19.5 K/CU MM (ref 4–10.5)

## 2024-01-09 PROCEDURE — 6370000000 HC RX 637 (ALT 250 FOR IP): Performed by: STUDENT IN AN ORGANIZED HEALTH CARE EDUCATION/TRAINING PROGRAM

## 2024-01-09 PROCEDURE — 6360000002 HC RX W HCPCS: Performed by: PHYSICIAN ASSISTANT

## 2024-01-09 PROCEDURE — 80069 RENAL FUNCTION PANEL: CPT

## 2024-01-09 PROCEDURE — 87040 BLOOD CULTURE FOR BACTERIA: CPT

## 2024-01-09 PROCEDURE — 6370000000 HC RX 637 (ALT 250 FOR IP): Performed by: INTERNAL MEDICINE

## 2024-01-09 PROCEDURE — 87186 SC STD MICRODIL/AGAR DIL: CPT

## 2024-01-09 PROCEDURE — 6360000002 HC RX W HCPCS: Performed by: INTERNAL MEDICINE

## 2024-01-09 PROCEDURE — 94640 AIRWAY INHALATION TREATMENT: CPT

## 2024-01-09 PROCEDURE — 83605 ASSAY OF LACTIC ACID: CPT

## 2024-01-09 PROCEDURE — 6370000000 HC RX 637 (ALT 250 FOR IP): Performed by: PHYSICIAN ASSISTANT

## 2024-01-09 PROCEDURE — 2140000000 HC CCU INTERMEDIATE R&B

## 2024-01-09 PROCEDURE — 85025 COMPLETE CBC W/AUTO DIFF WBC: CPT

## 2024-01-09 PROCEDURE — 99232 SBSQ HOSP IP/OBS MODERATE 35: CPT | Performed by: INTERNAL MEDICINE

## 2024-01-09 PROCEDURE — 94150 VITAL CAPACITY TEST: CPT

## 2024-01-09 PROCEDURE — 94761 N-INVAS EAR/PLS OXIMETRY MLT: CPT

## 2024-01-09 PROCEDURE — 36415 COLL VENOUS BLD VENIPUNCTURE: CPT

## 2024-01-09 PROCEDURE — 2700000000 HC OXYGEN THERAPY PER DAY

## 2024-01-09 PROCEDURE — 87150 DNA/RNA AMPLIFIED PROBE: CPT

## 2024-01-09 PROCEDURE — 2580000003 HC RX 258: Performed by: INTERNAL MEDICINE

## 2024-01-09 PROCEDURE — APPSS30 APP SPLIT SHARED TIME 16-30 MINUTES

## 2024-01-09 PROCEDURE — 99233 SBSQ HOSP IP/OBS HIGH 50: CPT | Performed by: INTERNAL MEDICINE

## 2024-01-09 RX ORDER — BISACODYL 10 MG
10 SUPPOSITORY, RECTAL RECTAL DAILY
Status: DISPENSED | OUTPATIENT
Start: 2024-01-09 | End: 2024-01-12

## 2024-01-09 RX ORDER — LORAZEPAM 1 MG/1
1 TABLET ORAL DAILY PRN
Status: DISCONTINUED | OUTPATIENT
Start: 2024-01-09 | End: 2024-01-12

## 2024-01-09 RX ORDER — LORAZEPAM 2 MG/ML
0.5 INJECTION INTRAMUSCULAR DAILY PRN
Status: DISCONTINUED | OUTPATIENT
Start: 2024-01-09 | End: 2024-01-12

## 2024-01-09 RX ADMIN — BUDESONIDE AND FORMOTEROL FUMARATE DIHYDRATE 2 PUFF: 160; 4.5 AEROSOL RESPIRATORY (INHALATION) at 07:40

## 2024-01-09 RX ADMIN — LORAZEPAM 1 MG: 1 TABLET ORAL at 11:37

## 2024-01-09 RX ADMIN — METOCLOPRAMIDE HYDROCHLORIDE 10 MG: 5 INJECTION INTRAMUSCULAR; INTRAVENOUS at 05:32

## 2024-01-09 RX ADMIN — Medication 10 MG: at 08:35

## 2024-01-09 RX ADMIN — METHYLPREDNISOLONE SODIUM SUCCINATE 20 MG: 40 INJECTION, POWDER, FOR SOLUTION INTRAMUSCULAR; INTRAVENOUS at 08:34

## 2024-01-09 RX ADMIN — ATORVASTATIN CALCIUM 40 MG: 40 TABLET, FILM COATED ORAL at 08:35

## 2024-01-09 RX ADMIN — IPRATROPIUM BROMIDE AND ALBUTEROL SULFATE 1 DOSE: 2.5; .5 SOLUTION RESPIRATORY (INHALATION) at 07:40

## 2024-01-09 RX ADMIN — SODIUM CHLORIDE, PRESERVATIVE FREE 10 ML: 5 INJECTION INTRAVENOUS at 00:09

## 2024-01-09 RX ADMIN — DILTIAZEM HYDROCHLORIDE 60 MG: 60 TABLET, FILM COATED ORAL at 16:07

## 2024-01-09 RX ADMIN — METOPROLOL TARTRATE 75 MG: 50 TABLET, FILM COATED ORAL at 08:34

## 2024-01-09 RX ADMIN — ACETYLCYSTEINE 600 MG: 200 SOLUTION ORAL; RESPIRATORY (INHALATION) at 07:40

## 2024-01-09 RX ADMIN — BUDESONIDE AND FORMOTEROL FUMARATE DIHYDRATE 2 PUFF: 160; 4.5 AEROSOL RESPIRATORY (INHALATION) at 20:11

## 2024-01-09 RX ADMIN — BUSPIRONE HYDROCHLORIDE 30 MG: 15 TABLET ORAL at 08:35

## 2024-01-09 RX ADMIN — METHYLPREDNISOLONE SODIUM SUCCINATE 20 MG: 40 INJECTION, POWDER, FOR SOLUTION INTRAMUSCULAR; INTRAVENOUS at 20:43

## 2024-01-09 RX ADMIN — METOCLOPRAMIDE HYDROCHLORIDE 10 MG: 5 INJECTION INTRAMUSCULAR; INTRAVENOUS at 13:32

## 2024-01-09 RX ADMIN — SODIUM CHLORIDE, PRESERVATIVE FREE 10 ML: 5 INJECTION INTRAVENOUS at 20:44

## 2024-01-09 RX ADMIN — METOPROLOL TARTRATE 75 MG: 50 TABLET, FILM COATED ORAL at 20:44

## 2024-01-09 RX ADMIN — ACETYLCYSTEINE 600 MG: 200 SOLUTION ORAL; RESPIRATORY (INHALATION) at 20:11

## 2024-01-09 RX ADMIN — SODIUM CHLORIDE, PRESERVATIVE FREE 10 ML: 5 INJECTION INTRAVENOUS at 08:36

## 2024-01-09 RX ADMIN — IPRATROPIUM BROMIDE AND ALBUTEROL SULFATE 1 DOSE: 2.5; .5 SOLUTION RESPIRATORY (INHALATION) at 20:10

## 2024-01-09 RX ADMIN — LANSOPRAZOLE 30 MG: 30 TABLET, ORALLY DISINTEGRATING ORAL at 05:32

## 2024-01-09 RX ADMIN — POLYETHYLENE GLYCOL (3350) 17 G: 17 POWDER, FOR SOLUTION ORAL at 17:50

## 2024-01-09 RX ADMIN — BISACODYL 10 MG: 10 SUPPOSITORY RECTAL at 13:28

## 2024-01-09 RX ADMIN — DILTIAZEM HYDROCHLORIDE 60 MG: 60 TABLET, FILM COATED ORAL at 11:37

## 2024-01-09 RX ADMIN — SODIUM CHLORIDE, PRESERVATIVE FREE 10 ML: 5 INJECTION INTRAVENOUS at 08:37

## 2024-01-09 RX ADMIN — LANSOPRAZOLE 30 MG: 30 TABLET, ORALLY DISINTEGRATING ORAL at 16:07

## 2024-01-09 RX ADMIN — RIVAROXABAN 20 MG: 20 TABLET, FILM COATED ORAL at 16:07

## 2024-01-09 RX ADMIN — ESCITALOPRAM OXALATE 20 MG: 10 TABLET, FILM COATED ORAL at 08:35

## 2024-01-09 RX ADMIN — TIZANIDINE 2 MG: 2 TABLET ORAL at 20:44

## 2024-01-09 RX ADMIN — METOCLOPRAMIDE HYDROCHLORIDE 10 MG: 5 INJECTION INTRAMUSCULAR; INTRAVENOUS at 20:44

## 2024-01-09 ASSESSMENT — PAIN DESCRIPTION - LOCATION
LOCATION: BACK
LOCATION: EAR
LOCATION: HEAD

## 2024-01-09 ASSESSMENT — PAIN SCALES - GENERAL
PAINLEVEL_OUTOF10: 0
PAINLEVEL_OUTOF10: 5
PAINLEVEL_OUTOF10: 10
PAINLEVEL_OUTOF10: 3
PAINLEVEL_OUTOF10: 0

## 2024-01-09 ASSESSMENT — PAIN DESCRIPTION - DESCRIPTORS
DESCRIPTORS: ACHING

## 2024-01-09 ASSESSMENT — PAIN DESCRIPTION - FREQUENCY
FREQUENCY: INTERMITTENT
FREQUENCY: CONTINUOUS

## 2024-01-09 ASSESSMENT — PAIN - FUNCTIONAL ASSESSMENT
PAIN_FUNCTIONAL_ASSESSMENT: ACTIVITIES ARE NOT PREVENTED
PAIN_FUNCTIONAL_ASSESSMENT: PREVENTS OR INTERFERES SOME ACTIVE ACTIVITIES AND ADLS

## 2024-01-09 ASSESSMENT — PAIN DESCRIPTION - PAIN TYPE: TYPE: CHRONIC PAIN

## 2024-01-09 ASSESSMENT — PAIN DESCRIPTION - ONSET
ONSET: ON-GOING
ONSET: ON-GOING

## 2024-01-09 ASSESSMENT — PAIN SCALES - WONG BAKER: WONGBAKER_NUMERICALRESPONSE: 4

## 2024-01-09 ASSESSMENT — PAIN DESCRIPTION - ORIENTATION: ORIENTATION: LEFT

## 2024-01-09 NOTE — PLAN OF CARE
Care plan reviewed.    _ Pt with c/o back pain especially when arms up, unable to complete chest tx.    _ Pt drowsy but was able to complete skull tx without difficulty, paulette well.     _ Shortness of breath with minimal exertion, improves with rest.

## 2024-01-09 NOTE — CARE COORDINATION
Pre-cert pending with [facility].  Confidential VM left for Mindy/SB to notify CM when she receives the pre-cert. PT/OT updated yesterday.  D/C instructions are on front of packet located with the soft chart.  TE    PAS/DONE.

## 2024-01-09 NOTE — PROGRESS NOTES
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    TREATMENT COURSE:   Oncology History   NSCLC metastatic to bone (HCC)   12/27/2023 -  Chemotherapy    OP CARBOplatin  AUC 6 + PACLitaxel + pembrolizumab Q21D  Plan Provider: Codi Sprague MD  Treatment goal: Control  Line of treatment: 1st Line     Malignant neoplasm of overlapping sites of lung (HCC)   12/27/2023 -  Chemotherapy    OP CARBOplatin  AUC 6 + PACLitaxel + pembrolizumab Q21D  Plan Provider: Codi Sprague MD  Treatment goal: Control  Line of treatment: 1st Line           Site: base of skull lesion and lung   Current Total Radiation Dose: 600 cGy  Fraction: 2/10    Doing okay, still an inpatient.       EXAM  Wt Readings from Last 3 Encounters:   01/08/24 65.2 kg (143 lb 11.2 oz)   12/30/23 72.6 kg (160 lb)   12/21/23 72.3 kg (159 lb 6.4 oz)     NAD      Setup images, chart, plan reviewed    A/P:   Tolerating RT well  Continue RT as planned    Only 2 treatments to skullbase; however, ativan for anxiety seemed to help today. He has only had 1 treatment to lung, today said his back hurt too much. We will attempt both tomorrow but if he can't have the lung treated we may drop it and pick back up at later time.       Electronically signed by Karlo Bond MD on 1/9/2024 at 12:41 PM

## 2024-01-10 ENCOUNTER — HOSPITAL ENCOUNTER (OUTPATIENT)
Dept: RADIATION ONCOLOGY | Age: 79
Discharge: HOME | End: 2024-01-10

## 2024-01-10 LAB
ALBUMIN SERPL-MCNC: 3.4 GM/DL (ref 3.4–5)
ANION GAP SERPL CALCULATED.3IONS-SCNC: 8 MMOL/L (ref 7–16)
BASOPHILS ABSOLUTE: 0 K/CU MM
BASOPHILS RELATIVE PERCENT: 0.2 % (ref 0–1)
BUN SERPL-MCNC: 32 MG/DL (ref 6–23)
CALCIUM SERPL-MCNC: 9.1 MG/DL (ref 8.3–10.6)
CHLORIDE BLD-SCNC: 97 MMOL/L (ref 99–110)
CO2: 29 MMOL/L (ref 21–32)
CREAT SERPL-MCNC: 0.7 MG/DL (ref 0.9–1.3)
CRP SERPL HS-MCNC: 18.9 MG/L
DIFFERENTIAL TYPE: ABNORMAL
EOSINOPHILS ABSOLUTE: 0 K/CU MM
EOSINOPHILS RELATIVE PERCENT: 0.1 % (ref 0–3)
GFR SERPL CREATININE-BSD FRML MDRD: >60 ML/MIN/1.73M2
GLUCOSE BLD-MCNC: 124 MG/DL (ref 70–99)
GLUCOSE BLD-MCNC: 124 MG/DL (ref 70–99)
GLUCOSE BLD-MCNC: 127 MG/DL (ref 70–99)
GLUCOSE BLD-MCNC: 138 MG/DL (ref 70–99)
GLUCOSE BLD-MCNC: 141 MG/DL (ref 70–99)
GLUCOSE BLD-MCNC: 164 MG/DL (ref 70–99)
GLUCOSE BLD-MCNC: 165 MG/DL (ref 70–99)
GLUCOSE SERPL-MCNC: 138 MG/DL (ref 70–99)
HCT VFR BLD CALC: 37.4 % (ref 42–52)
HEMOGLOBIN: 11.7 GM/DL (ref 13.5–18)
IMMATURE NEUTROPHIL %: 2.4 % (ref 0–0.43)
LYMPHOCYTES ABSOLUTE: 4.1 K/CU MM
LYMPHOCYTES RELATIVE PERCENT: 22.1 % (ref 24–44)
MCH RBC QN AUTO: 32.1 PG (ref 27–31)
MCHC RBC AUTO-ENTMCNC: 31.3 % (ref 32–36)
MCV RBC AUTO: 102.5 FL (ref 78–100)
MONOCYTES ABSOLUTE: 0.9 K/CU MM
MONOCYTES RELATIVE PERCENT: 4.8 % (ref 0–4)
NUCLEATED RBC %: 0 %
PDW BLD-RTO: 13.7 % (ref 11.7–14.9)
PHOSPHORUS: 2.5 MG/DL (ref 2.5–4.9)
PLATELET # BLD: 251 K/CU MM (ref 140–440)
PMV BLD AUTO: 9.6 FL (ref 7.5–11.1)
POTASSIUM SERPL-SCNC: 5.1 MMOL/L (ref 3.5–5.1)
PROCALCITONIN SERPL-MCNC: 0.11 NG/ML
RBC # BLD: 3.65 M/CU MM (ref 4.6–6.2)
SEGMENTED NEUTROPHILS ABSOLUTE COUNT: 13.1 K/CU MM
SEGMENTED NEUTROPHILS RELATIVE PERCENT: 70.4 % (ref 36–66)
SODIUM BLD-SCNC: 134 MMOL/L (ref 135–145)
TOTAL IMMATURE NEUTOROPHIL: 0.44 K/CU MM
TOTAL NUCLEATED RBC: 0 K/CU MM
WBC # BLD: 18.6 K/CU MM (ref 4–10.5)

## 2024-01-10 PROCEDURE — 77386 HC NTSTY MODUL RAD TX DLVR CPLX: CPT | Performed by: RADIOLOGY

## 2024-01-10 PROCEDURE — APPSS60 APP SPLIT SHARED TIME 46-60 MINUTES: Performed by: NURSE PRACTITIONER

## 2024-01-10 PROCEDURE — 6360000002 HC RX W HCPCS: Performed by: PHYSICIAN ASSISTANT

## 2024-01-10 PROCEDURE — 97116 GAIT TRAINING THERAPY: CPT

## 2024-01-10 PROCEDURE — 99223 1ST HOSP IP/OBS HIGH 75: CPT | Performed by: INTERNAL MEDICINE

## 2024-01-10 PROCEDURE — 94640 AIRWAY INHALATION TREATMENT: CPT

## 2024-01-10 PROCEDURE — 6370000000 HC RX 637 (ALT 250 FOR IP): Performed by: FAMILY MEDICINE

## 2024-01-10 PROCEDURE — 99232 SBSQ HOSP IP/OBS MODERATE 35: CPT | Performed by: INTERNAL MEDICINE

## 2024-01-10 PROCEDURE — 94669 MECHANICAL CHEST WALL OSCILL: CPT

## 2024-01-10 PROCEDURE — 94150 VITAL CAPACITY TEST: CPT

## 2024-01-10 PROCEDURE — 6370000000 HC RX 637 (ALT 250 FOR IP): Performed by: INTERNAL MEDICINE

## 2024-01-10 PROCEDURE — 6370000000 HC RX 637 (ALT 250 FOR IP): Performed by: PHYSICIAN ASSISTANT

## 2024-01-10 PROCEDURE — 6370000000 HC RX 637 (ALT 250 FOR IP): Performed by: STUDENT IN AN ORGANIZED HEALTH CARE EDUCATION/TRAINING PROGRAM

## 2024-01-10 PROCEDURE — 2580000003 HC RX 258: Performed by: INTERNAL MEDICINE

## 2024-01-10 PROCEDURE — 84145 PROCALCITONIN (PCT): CPT

## 2024-01-10 PROCEDURE — 86140 C-REACTIVE PROTEIN: CPT

## 2024-01-10 PROCEDURE — 2140000000 HC CCU INTERMEDIATE R&B

## 2024-01-10 PROCEDURE — 93005 ELECTROCARDIOGRAM TRACING: CPT | Performed by: INTERNAL MEDICINE

## 2024-01-10 PROCEDURE — 2700000000 HC OXYGEN THERAPY PER DAY

## 2024-01-10 PROCEDURE — 87040 BLOOD CULTURE FOR BACTERIA: CPT

## 2024-01-10 PROCEDURE — 36415 COLL VENOUS BLD VENIPUNCTURE: CPT

## 2024-01-10 PROCEDURE — 6360000002 HC RX W HCPCS: Performed by: FAMILY MEDICINE

## 2024-01-10 PROCEDURE — 94761 N-INVAS EAR/PLS OXIMETRY MLT: CPT

## 2024-01-10 PROCEDURE — 6360000002 HC RX W HCPCS: Performed by: INTERNAL MEDICINE

## 2024-01-10 PROCEDURE — 85025 COMPLETE CBC W/AUTO DIFF WBC: CPT

## 2024-01-10 PROCEDURE — 80069 RENAL FUNCTION PANEL: CPT

## 2024-01-10 PROCEDURE — 82962 GLUCOSE BLOOD TEST: CPT

## 2024-01-10 PROCEDURE — 77014 CHG CT GUIDANCE RADIATION THERAPY FLDS PLACEMENT: CPT | Performed by: RADIOLOGY

## 2024-01-10 PROCEDURE — 97535 SELF CARE MNGMENT TRAINING: CPT

## 2024-01-10 RX ORDER — FLUCONAZOLE 2 MG/ML
400 INJECTION, SOLUTION INTRAVENOUS EVERY 24 HOURS
Status: DISCONTINUED | OUTPATIENT
Start: 2024-01-11 | End: 2024-01-10 | Stop reason: SDUPTHER

## 2024-01-10 RX ORDER — HYDROXYZINE HYDROCHLORIDE 10 MG/1
10 TABLET, FILM COATED ORAL 3 TIMES DAILY PRN
Status: DISCONTINUED | OUTPATIENT
Start: 2024-01-10 | End: 2024-01-10

## 2024-01-10 RX ORDER — FLUCONAZOLE 2 MG/ML
400 INJECTION, SOLUTION INTRAVENOUS EVERY 24 HOURS
Status: DISCONTINUED | OUTPATIENT
Start: 2024-01-12 | End: 2024-01-12

## 2024-01-10 RX ORDER — LORAZEPAM 2 MG/ML
0.5 INJECTION INTRAMUSCULAR
Status: COMPLETED | OUTPATIENT
Start: 2024-01-10 | End: 2024-01-10

## 2024-01-10 RX ORDER — FLUCONAZOLE 2 MG/ML
400 INJECTION, SOLUTION INTRAVENOUS EVERY 24 HOURS
Status: DISCONTINUED | OUTPATIENT
Start: 2024-01-10 | End: 2024-01-11

## 2024-01-10 RX ORDER — FLUCONAZOLE 2 MG/ML
400 INJECTION, SOLUTION INTRAVENOUS EVERY 24 HOURS
Status: COMPLETED | OUTPATIENT
Start: 2024-01-10 | End: 2024-01-11

## 2024-01-10 RX ORDER — HYDROXYZINE HYDROCHLORIDE 10 MG/1
10 TABLET, FILM COATED ORAL 3 TIMES DAILY PRN
Status: DISCONTINUED | OUTPATIENT
Start: 2024-01-10 | End: 2024-01-16 | Stop reason: HOSPADM

## 2024-01-10 RX ADMIN — SODIUM CHLORIDE, PRESERVATIVE FREE 10 ML: 5 INJECTION INTRAVENOUS at 22:19

## 2024-01-10 RX ADMIN — LANSOPRAZOLE 30 MG: 30 TABLET, ORALLY DISINTEGRATING ORAL at 16:25

## 2024-01-10 RX ADMIN — SODIUM CHLORIDE, PRESERVATIVE FREE 10 ML: 5 INJECTION INTRAVENOUS at 09:55

## 2024-01-10 RX ADMIN — ACETYLCYSTEINE 600 MG: 200 SOLUTION ORAL; RESPIRATORY (INHALATION) at 19:46

## 2024-01-10 RX ADMIN — Medication 10 MG: at 11:55

## 2024-01-10 RX ADMIN — ATORVASTATIN CALCIUM 40 MG: 40 TABLET, FILM COATED ORAL at 09:53

## 2024-01-10 RX ADMIN — LORAZEPAM 0.5 MG: 2 INJECTION, SOLUTION INTRAMUSCULAR; INTRAVENOUS at 01:17

## 2024-01-10 RX ADMIN — DILTIAZEM HYDROCHLORIDE 60 MG: 60 TABLET, FILM COATED ORAL at 05:00

## 2024-01-10 RX ADMIN — Medication 10 MG: at 02:28

## 2024-01-10 RX ADMIN — METOCLOPRAMIDE HYDROCHLORIDE 10 MG: 5 INJECTION INTRAMUSCULAR; INTRAVENOUS at 05:02

## 2024-01-10 RX ADMIN — BUDESONIDE AND FORMOTEROL FUMARATE DIHYDRATE 2 PUFF: 160; 4.5 AEROSOL RESPIRATORY (INHALATION) at 19:46

## 2024-01-10 RX ADMIN — METOPROLOL TARTRATE 75 MG: 50 TABLET, FILM COATED ORAL at 09:54

## 2024-01-10 RX ADMIN — DILTIAZEM HYDROCHLORIDE 60 MG: 60 TABLET, FILM COATED ORAL at 16:25

## 2024-01-10 RX ADMIN — BUSPIRONE HYDROCHLORIDE 30 MG: 15 TABLET ORAL at 09:54

## 2024-01-10 RX ADMIN — HYDROXYZINE HYDROCHLORIDE 10 MG: 10 TABLET ORAL at 05:00

## 2024-01-10 RX ADMIN — DILTIAZEM HYDROCHLORIDE 60 MG: 60 TABLET, FILM COATED ORAL at 00:33

## 2024-01-10 RX ADMIN — ACETAMINOPHEN 650 MG: 325 TABLET ORAL at 05:55

## 2024-01-10 RX ADMIN — DILTIAZEM HYDROCHLORIDE 60 MG: 60 TABLET, FILM COATED ORAL at 11:55

## 2024-01-10 RX ADMIN — METOCLOPRAMIDE HYDROCHLORIDE 10 MG: 5 INJECTION INTRAMUSCULAR; INTRAVENOUS at 22:19

## 2024-01-10 RX ADMIN — SODIUM CHLORIDE, PRESERVATIVE FREE 10 ML: 5 INJECTION INTRAVENOUS at 05:03

## 2024-01-10 RX ADMIN — ESCITALOPRAM OXALATE 20 MG: 10 TABLET, FILM COATED ORAL at 09:54

## 2024-01-10 RX ADMIN — IPRATROPIUM BROMIDE AND ALBUTEROL SULFATE 1 DOSE: 2.5; .5 SOLUTION RESPIRATORY (INHALATION) at 19:46

## 2024-01-10 RX ADMIN — METHYLPREDNISOLONE SODIUM SUCCINATE 20 MG: 40 INJECTION, POWDER, FOR SOLUTION INTRAMUSCULAR; INTRAVENOUS at 22:19

## 2024-01-10 RX ADMIN — METOPROLOL TARTRATE 75 MG: 50 TABLET, FILM COATED ORAL at 22:19

## 2024-01-10 RX ADMIN — ACETYLCYSTEINE 600 MG: 200 SOLUTION ORAL; RESPIRATORY (INHALATION) at 07:39

## 2024-01-10 RX ADMIN — BUTALBITAL, ACETAMINOPHEN, AND CAFFEINE 1 TABLET: 325; 50; 40 TABLET ORAL at 01:21

## 2024-01-10 RX ADMIN — LORAZEPAM 1 MG: 1 TABLET ORAL at 11:55

## 2024-01-10 RX ADMIN — TIZANIDINE 2 MG: 2 TABLET ORAL at 22:19

## 2024-01-10 RX ADMIN — METHYLPREDNISOLONE SODIUM SUCCINATE 20 MG: 40 INJECTION, POWDER, FOR SOLUTION INTRAMUSCULAR; INTRAVENOUS at 09:55

## 2024-01-10 RX ADMIN — BUDESONIDE AND FORMOTEROL FUMARATE DIHYDRATE 2 PUFF: 160; 4.5 AEROSOL RESPIRATORY (INHALATION) at 07:40

## 2024-01-10 RX ADMIN — LANSOPRAZOLE 30 MG: 30 TABLET, ORALLY DISINTEGRATING ORAL at 05:00

## 2024-01-10 RX ADMIN — METOCLOPRAMIDE HYDROCHLORIDE 10 MG: 5 INJECTION INTRAMUSCULAR; INTRAVENOUS at 11:55

## 2024-01-10 RX ADMIN — RIVAROXABAN 20 MG: 20 TABLET, FILM COATED ORAL at 16:25

## 2024-01-10 RX ADMIN — IPRATROPIUM BROMIDE AND ALBUTEROL SULFATE 1 DOSE: 2.5; .5 SOLUTION RESPIRATORY (INHALATION) at 07:39

## 2024-01-10 ASSESSMENT — PAIN DESCRIPTION - LOCATION
LOCATION: CHEST
LOCATION: HEAD

## 2024-01-10 ASSESSMENT — PAIN SCALES - GENERAL
PAINLEVEL_OUTOF10: 7
PAINLEVEL_OUTOF10: 3
PAINLEVEL_OUTOF10: 10
PAINLEVEL_OUTOF10: 0
PAINLEVEL_OUTOF10: 0
PAINLEVEL_OUTOF10: 5
PAINLEVEL_OUTOF10: 2
PAINLEVEL_OUTOF10: 8
PAINLEVEL_OUTOF10: 7
PAINLEVEL_OUTOF10: 0
PAINLEVEL_OUTOF10: 0
PAINLEVEL_OUTOF10: 6
PAINLEVEL_OUTOF10: 0

## 2024-01-10 ASSESSMENT — PAIN SCALES - WONG BAKER
WONGBAKER_NUMERICALRESPONSE: 0

## 2024-01-10 ASSESSMENT — PAIN DESCRIPTION - ORIENTATION
ORIENTATION: MID
ORIENTATION: RIGHT;LEFT;ANTERIOR;POSTERIOR
ORIENTATION: RIGHT;LEFT
ORIENTATION: RIGHT

## 2024-01-10 ASSESSMENT — PAIN - FUNCTIONAL ASSESSMENT
PAIN_FUNCTIONAL_ASSESSMENT: PREVENTS OR INTERFERES SOME ACTIVE ACTIVITIES AND ADLS

## 2024-01-10 ASSESSMENT — PAIN DESCRIPTION - DESCRIPTORS
DESCRIPTORS: ACHING;CRAMPING
DESCRIPTORS: ACHING

## 2024-01-10 ASSESSMENT — PAIN DESCRIPTION - ONSET: ONSET: ON-GOING

## 2024-01-10 ASSESSMENT — PAIN DESCRIPTION - PAIN TYPE: TYPE: CHRONIC PAIN;ACUTE PAIN

## 2024-01-10 NOTE — CARE COORDINATION
Elier notified PARISA that the [facility] informed her that she should receive approval today.  She will notify CM as soon as she receives the approval.  RAMIRO

## 2024-01-10 NOTE — CARE COORDINATION
PRE-CERT PENDING WITH [facility] FOR [facility].  VM left for Mindy/PAOLA to call this CM with an update.  Requested updated PT/OT notes via wb.  D/c instructions are on front of packet located with the soft chart.  TE    .Pt returning/new to [facility] at discharge. Please call report to [phone] and fax orders, AVS, and discharge summaries to [phone].

## 2024-01-10 NOTE — CARE COORDINATION
[facility] for [facility] per Mindy/PAOLA and is good for at least a week.  They are able to accept pt whenever he is medically ready. Notified Dr Bernal via PS and updated pt.  D/C INSTRUCTIONS ARE ON FRONT OF PACKET LOCATED WITH THE SOFT CHART.  TE    .Pt returning/new to [facility] at discharge. Please call report to [phone] and fax orders, AVS, and discharge summaries to [phone].

## 2024-01-11 ENCOUNTER — TELEPHONE (OUTPATIENT)
Dept: ONCOLOGY | Age: 79
End: 2024-01-11

## 2024-01-11 ENCOUNTER — HOSPITAL ENCOUNTER (OUTPATIENT)
Dept: RADIATION ONCOLOGY | Age: 79
Discharge: HOME | End: 2024-01-11

## 2024-01-11 ENCOUNTER — APPOINTMENT (OUTPATIENT)
Dept: GENERAL RADIOLOGY | Age: 79
DRG: 177 | End: 2024-01-11
Payer: OTHER GOVERNMENT

## 2024-01-11 PROBLEM — J44.1 COPD EXACERBATION (HCC): Status: ACTIVE | Noted: 2024-01-11

## 2024-01-11 PROBLEM — B37.7 CANDIDEMIA (HCC): Status: ACTIVE | Noted: 2024-01-11

## 2024-01-11 LAB
BASE EXCESS MIXED: 6 (ref 0–3)
COMMENT: ABNORMAL
CRP SERPL HS-MCNC: 12.2 MG/L
EKG ATRIAL RATE: 233 BPM
EKG ATRIAL RATE: 242 BPM
EKG DIAGNOSIS: NORMAL
EKG DIAGNOSIS: NORMAL
EKG P AXIS: 240 DEGREES
EKG P AXIS: 261 DEGREES
EKG Q-T INTERVAL: 434 MS
EKG Q-T INTERVAL: 460 MS
EKG QRS DURATION: 100 MS
EKG QRS DURATION: 94 MS
EKG QTC CALCULATION (BAZETT): 451 MS
EKG QTC CALCULATION (BAZETT): 533 MS
EKG R AXIS: 77 DEGREES
EKG R AXIS: 80 DEGREES
EKG T AXIS: 19 DEGREES
EKG T AXIS: 87 DEGREES
EKG VENTRICULAR RATE: 58 BPM
EKG VENTRICULAR RATE: 91 BPM
FERRITIN: 708 NG/ML (ref 30–400)
FOLATE SERPL-MCNC: 12.2 NG/ML (ref 3.1–17.5)
GLUCOSE BLD-MCNC: 115 MG/DL (ref 70–99)
GLUCOSE BLD-MCNC: 150 MG/DL (ref 70–99)
GLUCOSE BLD-MCNC: 219 MG/DL (ref 70–99)
GLUCOSE BLD-MCNC: 235 MG/DL (ref 70–99)
HCO3 VENOUS: 34.9 MMOL/L (ref 22–29)
IRON: 52 UG/DL (ref 59–158)
O2 SAT, VEN: 40 % (ref 50–70)
PCO2, VEN: 71 MMHG (ref 41–51)
PCT TRANSFERRIN: 31 % (ref 10–44)
PH VENOUS: 7.3 (ref 7.32–7.43)
PO2, VEN: 30 MMHG (ref 28–48)
PROCALCITONIN SERPL-MCNC: 0.11 NG/ML
TOTAL IRON BINDING CAPACITY: 167 UG/DL (ref 250–450)
UNSATURATED IRON BINDING CAPACITY: 115 UG/DL (ref 110–370)
VITAMIN B-12: 529.2 PG/ML (ref 211–911)

## 2024-01-11 PROCEDURE — 94640 AIRWAY INHALATION TREATMENT: CPT

## 2024-01-11 PROCEDURE — 51702 INSERT TEMP BLADDER CATH: CPT

## 2024-01-11 PROCEDURE — 84145 PROCALCITONIN (PCT): CPT

## 2024-01-11 PROCEDURE — 82962 GLUCOSE BLOOD TEST: CPT

## 2024-01-11 PROCEDURE — 2700000000 HC OXYGEN THERAPY PER DAY

## 2024-01-11 PROCEDURE — 94669 MECHANICAL CHEST WALL OSCILL: CPT

## 2024-01-11 PROCEDURE — 6360000002 HC RX W HCPCS: Performed by: PHYSICIAN ASSISTANT

## 2024-01-11 PROCEDURE — 6360000002 HC RX W HCPCS: Performed by: INTERNAL MEDICINE

## 2024-01-11 PROCEDURE — 99232 SBSQ HOSP IP/OBS MODERATE 35: CPT | Performed by: INTERNAL MEDICINE

## 2024-01-11 PROCEDURE — 74018 RADEX ABDOMEN 1 VIEW: CPT

## 2024-01-11 PROCEDURE — 82607 VITAMIN B-12: CPT

## 2024-01-11 PROCEDURE — 6370000000 HC RX 637 (ALT 250 FOR IP): Performed by: FAMILY MEDICINE

## 2024-01-11 PROCEDURE — 6370000000 HC RX 637 (ALT 250 FOR IP): Performed by: INTERNAL MEDICINE

## 2024-01-11 PROCEDURE — APPSS30 APP SPLIT SHARED TIME 16-30 MINUTES

## 2024-01-11 PROCEDURE — 36415 COLL VENOUS BLD VENIPUNCTURE: CPT

## 2024-01-11 PROCEDURE — 94150 VITAL CAPACITY TEST: CPT

## 2024-01-11 PROCEDURE — 83550 IRON BINDING TEST: CPT

## 2024-01-11 PROCEDURE — 82805 BLOOD GASES W/O2 SATURATION: CPT

## 2024-01-11 PROCEDURE — 86140 C-REACTIVE PROTEIN: CPT

## 2024-01-11 PROCEDURE — 99233 SBSQ HOSP IP/OBS HIGH 50: CPT | Performed by: NURSE PRACTITIONER

## 2024-01-11 PROCEDURE — 2580000003 HC RX 258: Performed by: INTERNAL MEDICINE

## 2024-01-11 PROCEDURE — 99233 SBSQ HOSP IP/OBS HIGH 50: CPT | Performed by: INTERNAL MEDICINE

## 2024-01-11 PROCEDURE — 87449 NOS EACH ORGANISM AG IA: CPT

## 2024-01-11 PROCEDURE — 6370000000 HC RX 637 (ALT 250 FOR IP): Performed by: PHYSICIAN ASSISTANT

## 2024-01-11 PROCEDURE — 71045 X-RAY EXAM CHEST 1 VIEW: CPT

## 2024-01-11 PROCEDURE — 83540 ASSAY OF IRON: CPT

## 2024-01-11 PROCEDURE — 82746 ASSAY OF FOLIC ACID SERUM: CPT

## 2024-01-11 PROCEDURE — 82728 ASSAY OF FERRITIN: CPT

## 2024-01-11 PROCEDURE — 6370000000 HC RX 637 (ALT 250 FOR IP): Performed by: STUDENT IN AN ORGANIZED HEALTH CARE EDUCATION/TRAINING PROGRAM

## 2024-01-11 PROCEDURE — 76937 US GUIDE VASCULAR ACCESS: CPT

## 2024-01-11 PROCEDURE — 93010 ELECTROCARDIOGRAM REPORT: CPT | Performed by: INTERNAL MEDICINE

## 2024-01-11 PROCEDURE — 93005 ELECTROCARDIOGRAM TRACING: CPT | Performed by: INTERNAL MEDICINE

## 2024-01-11 PROCEDURE — 2140000000 HC CCU INTERMEDIATE R&B

## 2024-01-11 PROCEDURE — 94761 N-INVAS EAR/PLS OXIMETRY MLT: CPT

## 2024-01-11 RX ORDER — FLUCONAZOLE 2 MG/ML
400 INJECTION, SOLUTION INTRAVENOUS ONCE
Status: COMPLETED | OUTPATIENT
Start: 2024-01-11 | End: 2024-01-11

## 2024-01-11 RX ORDER — GUAIFENESIN 200 MG/10ML
100 LIQUID ORAL EVERY 6 HOURS PRN
Status: DISCONTINUED | OUTPATIENT
Start: 2024-01-11 | End: 2024-01-16 | Stop reason: HOSPADM

## 2024-01-11 RX ORDER — ALBUTEROL SULFATE 90 UG/1
2 AEROSOL, METERED RESPIRATORY (INHALATION) EVERY 4 HOURS PRN
Status: DISCONTINUED | OUTPATIENT
Start: 2024-01-11 | End: 2024-01-16 | Stop reason: HOSPADM

## 2024-01-11 RX ORDER — FUROSEMIDE 10 MG/ML
40 INJECTION INTRAMUSCULAR; INTRAVENOUS ONCE
Status: COMPLETED | OUTPATIENT
Start: 2024-01-11 | End: 2024-01-11

## 2024-01-11 RX ORDER — SENNA AND DOCUSATE SODIUM 50; 8.6 MG/1; MG/1
2 TABLET, FILM COATED ORAL 2 TIMES DAILY
Status: DISCONTINUED | OUTPATIENT
Start: 2024-01-11 | End: 2024-01-16 | Stop reason: HOSPADM

## 2024-01-11 RX ORDER — POLYETHYLENE GLYCOL 3350 17 G/17G
17 POWDER, FOR SOLUTION ORAL 2 TIMES DAILY
Status: DISCONTINUED | OUTPATIENT
Start: 2024-01-11 | End: 2024-01-16 | Stop reason: HOSPADM

## 2024-01-11 RX ADMIN — RIVAROXABAN 20 MG: 20 TABLET, FILM COATED ORAL at 17:12

## 2024-01-11 RX ADMIN — LIDOCAINE HYDROCHLORIDE: 20 SOLUTION ORAL; TOPICAL at 13:38

## 2024-01-11 RX ADMIN — SODIUM CHLORIDE, PRESERVATIVE FREE 10 ML: 5 INJECTION INTRAVENOUS at 22:31

## 2024-01-11 RX ADMIN — SODIUM CHLORIDE, PRESERVATIVE FREE 10 ML: 5 INJECTION INTRAVENOUS at 09:09

## 2024-01-11 RX ADMIN — BUDESONIDE AND FORMOTEROL FUMARATE DIHYDRATE 2 PUFF: 160; 4.5 AEROSOL RESPIRATORY (INHALATION) at 19:58

## 2024-01-11 RX ADMIN — DILTIAZEM HYDROCHLORIDE 60 MG: 60 TABLET, FILM COATED ORAL at 00:50

## 2024-01-11 RX ADMIN — IPRATROPIUM BROMIDE AND ALBUTEROL SULFATE 1 DOSE: 2.5; .5 SOLUTION RESPIRATORY (INHALATION) at 02:22

## 2024-01-11 RX ADMIN — HYDROXYZINE HYDROCHLORIDE 10 MG: 10 TABLET ORAL at 18:09

## 2024-01-11 RX ADMIN — IPRATROPIUM BROMIDE AND ALBUTEROL SULFATE 1 DOSE: 2.5; .5 SOLUTION RESPIRATORY (INHALATION) at 08:13

## 2024-01-11 RX ADMIN — FUROSEMIDE 40 MG: 10 INJECTION, SOLUTION INTRAMUSCULAR; INTRAVENOUS at 13:37

## 2024-01-11 RX ADMIN — ATORVASTATIN CALCIUM 40 MG: 40 TABLET, FILM COATED ORAL at 09:08

## 2024-01-11 RX ADMIN — LANSOPRAZOLE 30 MG: 30 TABLET, ORALLY DISINTEGRATING ORAL at 17:12

## 2024-01-11 RX ADMIN — HYDROMORPHONE HYDROCHLORIDE 0.5 MG: 1 INJECTION, SOLUTION INTRAMUSCULAR; INTRAVENOUS; SUBCUTANEOUS at 18:20

## 2024-01-11 RX ADMIN — FLUCONAZOLE 400 MG: 2 INJECTION, SOLUTION INTRAVENOUS at 05:10

## 2024-01-11 RX ADMIN — METOCLOPRAMIDE HYDROCHLORIDE 10 MG: 5 INJECTION INTRAMUSCULAR; INTRAVENOUS at 05:05

## 2024-01-11 RX ADMIN — METHYLPREDNISOLONE SODIUM SUCCINATE 20 MG: 40 INJECTION, POWDER, FOR SOLUTION INTRAMUSCULAR; INTRAVENOUS at 22:31

## 2024-01-11 RX ADMIN — ESCITALOPRAM OXALATE 20 MG: 10 TABLET, FILM COATED ORAL at 09:08

## 2024-01-11 RX ADMIN — FLUCONAZOLE 400 MG: 2 INJECTION, SOLUTION INTRAVENOUS at 07:17

## 2024-01-11 RX ADMIN — METOPROLOL TARTRATE 75 MG: 50 TABLET, FILM COATED ORAL at 22:32

## 2024-01-11 RX ADMIN — METOCLOPRAMIDE HYDROCHLORIDE 10 MG: 5 INJECTION INTRAMUSCULAR; INTRAVENOUS at 22:32

## 2024-01-11 RX ADMIN — Medication 10 MG: at 06:18

## 2024-01-11 RX ADMIN — DILTIAZEM HYDROCHLORIDE 60 MG: 60 TABLET, FILM COATED ORAL at 06:18

## 2024-01-11 RX ADMIN — BUSPIRONE HYDROCHLORIDE 30 MG: 15 TABLET ORAL at 09:08

## 2024-01-11 RX ADMIN — HYDROXYZINE HYDROCHLORIDE 10 MG: 10 TABLET ORAL at 01:45

## 2024-01-11 RX ADMIN — ALBUTEROL SULFATE 2 PUFF: 90 AEROSOL, METERED RESPIRATORY (INHALATION) at 12:12

## 2024-01-11 RX ADMIN — SODIUM CHLORIDE, PRESERVATIVE FREE 10 ML: 5 INJECTION INTRAVENOUS at 05:05

## 2024-01-11 RX ADMIN — METHYLPREDNISOLONE SODIUM SUCCINATE 20 MG: 40 INJECTION, POWDER, FOR SOLUTION INTRAMUSCULAR; INTRAVENOUS at 09:09

## 2024-01-11 RX ADMIN — POLYETHYLENE GLYCOL (3350) 17 G: 17 POWDER, FOR SOLUTION ORAL at 22:32

## 2024-01-11 RX ADMIN — SENNOSIDES, DOCUSATE SODIUM 2 TABLET: 8.6; 5 TABLET ORAL at 22:32

## 2024-01-11 RX ADMIN — Medication 10 MG: at 00:50

## 2024-01-11 RX ADMIN — IPRATROPIUM BROMIDE AND ALBUTEROL SULFATE 1 DOSE: 2.5; .5 SOLUTION RESPIRATORY (INHALATION) at 19:49

## 2024-01-11 RX ADMIN — METOCLOPRAMIDE HYDROCHLORIDE 10 MG: 5 INJECTION INTRAMUSCULAR; INTRAVENOUS at 12:26

## 2024-01-11 RX ADMIN — BUDESONIDE AND FORMOTEROL FUMARATE DIHYDRATE 2 PUFF: 160; 4.5 AEROSOL RESPIRATORY (INHALATION) at 08:14

## 2024-01-11 RX ADMIN — ACETYLCYSTEINE 600 MG: 200 SOLUTION ORAL; RESPIRATORY (INHALATION) at 08:14

## 2024-01-11 RX ADMIN — ACETYLCYSTEINE 600 MG: 200 SOLUTION ORAL; RESPIRATORY (INHALATION) at 19:56

## 2024-01-11 RX ADMIN — TIZANIDINE 2 MG: 2 TABLET ORAL at 22:32

## 2024-01-11 RX ADMIN — LANSOPRAZOLE 30 MG: 30 TABLET, ORALLY DISINTEGRATING ORAL at 06:18

## 2024-01-11 RX ADMIN — BISACODYL 10 MG: 10 SUPPOSITORY RECTAL at 09:08

## 2024-01-11 ASSESSMENT — PAIN - FUNCTIONAL ASSESSMENT
PAIN_FUNCTIONAL_ASSESSMENT: PREVENTS OR INTERFERES SOME ACTIVE ACTIVITIES AND ADLS
PAIN_FUNCTIONAL_ASSESSMENT: PREVENTS OR INTERFERES SOME ACTIVE ACTIVITIES AND ADLS
PAIN_FUNCTIONAL_ASSESSMENT: ACTIVITIES ARE NOT PREVENTED
PAIN_FUNCTIONAL_ASSESSMENT: ACTIVITIES ARE NOT PREVENTED

## 2024-01-11 ASSESSMENT — PAIN DESCRIPTION - DESCRIPTORS
DESCRIPTORS: ACHING
DESCRIPTORS: ACHING;THROBBING
DESCRIPTORS: ACHING
DESCRIPTORS: SHARP

## 2024-01-11 ASSESSMENT — PAIN DESCRIPTION - ORIENTATION
ORIENTATION: ANTERIOR
ORIENTATION: RIGHT
ORIENTATION: UPPER

## 2024-01-11 ASSESSMENT — PAIN SCALES - GENERAL
PAINLEVEL_OUTOF10: 10
PAINLEVEL_OUTOF10: 10
PAINLEVEL_OUTOF10: 0
PAINLEVEL_OUTOF10: 7
PAINLEVEL_OUTOF10: 8

## 2024-01-11 ASSESSMENT — PAIN DESCRIPTION - LOCATION
LOCATION: HEAD
LOCATION: HEAD
LOCATION: BACK
LOCATION: HEAD

## 2024-01-11 ASSESSMENT — PAIN DESCRIPTION - PAIN TYPE
TYPE: CHRONIC PAIN;ACUTE PAIN
TYPE: ACUTE PAIN;CHRONIC PAIN
TYPE: ACUTE PAIN;CHRONIC PAIN

## 2024-01-11 ASSESSMENT — PAIN DESCRIPTION - FREQUENCY
FREQUENCY: CONTINUOUS

## 2024-01-11 ASSESSMENT — PAIN DESCRIPTION - ONSET
ONSET: ON-GOING

## 2024-01-11 ASSESSMENT — PAIN SCALES - WONG BAKER: WONGBAKER_NUMERICALRESPONSE: 0

## 2024-01-11 NOTE — ACP (ADVANCE CARE PLANNING)
Resuscitation/Code Status Note on [address] (YOB: 1945)    At 1240 on January 11, 2024, resuscitation/code status decision was based on a thorough discussion with the patient's healthcare power of  - [address], patient's adult child - [address], and patient's next of kin - [address] .  The code status was made [id number] No additional code details.    Electronically signed by Robert Bernal MD on 1/11/24 at 12:40 PM EST

## 2024-01-11 NOTE — TELEPHONE ENCOUNTER
Patient Son Rasta would like a call back to review what treatment options the patient has.  Rasta states he was to get chemo with the radiation would like to know if that is still the plan considering patient current decline in health     Please call Rasta at [phone]

## 2024-01-11 NOTE — CARE COORDINATION
CM called Mindy at [facility] to confirm that pt is able to admit today, pending return call [facility]  1330 Per IDR pt not yet ready for discharge. Per therapy notes , Patient placed on Vapotherm 35 lpm 100%. Will continue to monitor. Patient sating 92%   CM following.

## 2024-01-11 NOTE — TELEPHONE ENCOUNTER
This RN called and spoke with patients son Rasta. Per Dr. Kincaid there is no plan to start chemotherapy and patient is not tolerating radiation treatment.. Rasta had questions as to if he should call family in to see him, Rasta directed to talk to his care team at the hospital to help him better assess the situation. Rasta agreeable to POC and has no further needs or questions at this time.

## 2024-01-12 ENCOUNTER — TELEPHONE (OUTPATIENT)
Dept: RADIATION ONCOLOGY | Age: 79
End: 2024-01-12

## 2024-01-12 ENCOUNTER — HOSPITAL ENCOUNTER (OUTPATIENT)
Dept: RADIATION ONCOLOGY | Age: 79
Discharge: HOME | End: 2024-01-12

## 2024-01-12 LAB
CRP SERPL HS-MCNC: 140.6 MG/L
CULTURE: ABNORMAL
CULTURE: ABNORMAL
GLUCOSE BLD-MCNC: 111 MG/DL (ref 70–99)
GLUCOSE BLD-MCNC: 128 MG/DL (ref 70–99)
GLUCOSE BLD-MCNC: 136 MG/DL (ref 70–99)
GLUCOSE BLD-MCNC: 158 MG/DL (ref 70–99)
GLUCOSE BLD-MCNC: 99 MG/DL (ref 70–99)
Lab: ABNORMAL
PROCALCITONIN SERPL-MCNC: 0.59 NG/ML
SPECIMEN: ABNORMAL

## 2024-01-12 PROCEDURE — 99232 SBSQ HOSP IP/OBS MODERATE 35: CPT | Performed by: PHYSICIAN ASSISTANT

## 2024-01-12 PROCEDURE — 2140000000 HC CCU INTERMEDIATE R&B

## 2024-01-12 PROCEDURE — 99233 SBSQ HOSP IP/OBS HIGH 50: CPT | Performed by: INTERNAL MEDICINE

## 2024-01-12 PROCEDURE — 6360000002 HC RX W HCPCS: Performed by: PHYSICIAN ASSISTANT

## 2024-01-12 PROCEDURE — 2580000003 HC RX 258: Performed by: INTERNAL MEDICINE

## 2024-01-12 PROCEDURE — 6360000002 HC RX W HCPCS: Performed by: INTERNAL MEDICINE

## 2024-01-12 PROCEDURE — 6370000000 HC RX 637 (ALT 250 FOR IP): Performed by: INTERNAL MEDICINE

## 2024-01-12 PROCEDURE — 94150 VITAL CAPACITY TEST: CPT

## 2024-01-12 PROCEDURE — 84145 PROCALCITONIN (PCT): CPT

## 2024-01-12 PROCEDURE — 2700000000 HC OXYGEN THERAPY PER DAY

## 2024-01-12 PROCEDURE — 36415 COLL VENOUS BLD VENIPUNCTURE: CPT

## 2024-01-12 PROCEDURE — 82962 GLUCOSE BLOOD TEST: CPT

## 2024-01-12 PROCEDURE — 6370000000 HC RX 637 (ALT 250 FOR IP): Performed by: STUDENT IN AN ORGANIZED HEALTH CARE EDUCATION/TRAINING PROGRAM

## 2024-01-12 PROCEDURE — 86140 C-REACTIVE PROTEIN: CPT

## 2024-01-12 PROCEDURE — 94761 N-INVAS EAR/PLS OXIMETRY MLT: CPT

## 2024-01-12 PROCEDURE — 94669 MECHANICAL CHEST WALL OSCILL: CPT

## 2024-01-12 PROCEDURE — 6370000000 HC RX 637 (ALT 250 FOR IP): Performed by: FAMILY MEDICINE

## 2024-01-12 PROCEDURE — 99233 SBSQ HOSP IP/OBS HIGH 50: CPT | Performed by: NURSE PRACTITIONER

## 2024-01-12 PROCEDURE — 94640 AIRWAY INHALATION TREATMENT: CPT

## 2024-01-12 RX ORDER — LORAZEPAM 1 MG/1
1 TABLET ORAL EVERY 6 HOURS PRN
Status: DISCONTINUED | OUTPATIENT
Start: 2024-01-12 | End: 2024-01-16 | Stop reason: HOSPADM

## 2024-01-12 RX ADMIN — METOPROLOL TARTRATE 75 MG: 50 TABLET, FILM COATED ORAL at 09:19

## 2024-01-12 RX ADMIN — ESCITALOPRAM OXALATE 20 MG: 10 TABLET, FILM COATED ORAL at 09:20

## 2024-01-12 RX ADMIN — FLUCONAZOLE 400 MG: 2 INJECTION, SOLUTION INTRAVENOUS at 06:05

## 2024-01-12 RX ADMIN — BUDESONIDE AND FORMOTEROL FUMARATE DIHYDRATE 2 PUFF: 160; 4.5 AEROSOL RESPIRATORY (INHALATION) at 21:25

## 2024-01-12 RX ADMIN — IPRATROPIUM BROMIDE AND ALBUTEROL SULFATE 1 DOSE: 2.5; .5 SOLUTION RESPIRATORY (INHALATION) at 21:24

## 2024-01-12 RX ADMIN — IPRATROPIUM BROMIDE AND ALBUTEROL SULFATE 1 DOSE: 2.5; .5 SOLUTION RESPIRATORY (INHALATION) at 08:30

## 2024-01-12 RX ADMIN — ACETYLCYSTEINE 600 MG: 200 SOLUTION ORAL; RESPIRATORY (INHALATION) at 08:30

## 2024-01-12 RX ADMIN — BUSPIRONE HYDROCHLORIDE 30 MG: 15 TABLET ORAL at 09:19

## 2024-01-12 RX ADMIN — ACETYLCYSTEINE 600 MG: 200 SOLUTION ORAL; RESPIRATORY (INHALATION) at 21:25

## 2024-01-12 RX ADMIN — HYDROMORPHONE HYDROCHLORIDE 0.5 MG: 1 INJECTION, SOLUTION INTRAMUSCULAR; INTRAVENOUS; SUBCUTANEOUS at 13:37

## 2024-01-12 RX ADMIN — HYDROXYZINE HYDROCHLORIDE 10 MG: 10 TABLET ORAL at 09:19

## 2024-01-12 RX ADMIN — RIVAROXABAN 20 MG: 20 TABLET, FILM COATED ORAL at 16:46

## 2024-01-12 RX ADMIN — LANSOPRAZOLE 30 MG: 30 TABLET, ORALLY DISINTEGRATING ORAL at 06:18

## 2024-01-12 RX ADMIN — SENNOSIDES, DOCUSATE SODIUM 2 TABLET: 8.6; 5 TABLET ORAL at 09:20

## 2024-01-12 RX ADMIN — POLYETHYLENE GLYCOL (3350) 17 G: 17 POWDER, FOR SOLUTION ORAL at 09:20

## 2024-01-12 RX ADMIN — BUDESONIDE AND FORMOTEROL FUMARATE DIHYDRATE 2 PUFF: 160; 4.5 AEROSOL RESPIRATORY (INHALATION) at 08:30

## 2024-01-12 RX ADMIN — LORAZEPAM 1 MG: 1 TABLET ORAL at 16:46

## 2024-01-12 RX ADMIN — HYDROMORPHONE HYDROCHLORIDE 0.5 MG: 1 INJECTION, SOLUTION INTRAMUSCULAR; INTRAVENOUS; SUBCUTANEOUS at 09:20

## 2024-01-12 RX ADMIN — ALBUTEROL SULFATE 2 PUFF: 90 AEROSOL, METERED RESPIRATORY (INHALATION) at 12:10

## 2024-01-12 RX ADMIN — METOCLOPRAMIDE HYDROCHLORIDE 10 MG: 5 INJECTION INTRAMUSCULAR; INTRAVENOUS at 13:37

## 2024-01-12 RX ADMIN — SODIUM CHLORIDE, PRESERVATIVE FREE 10 ML: 5 INJECTION INTRAVENOUS at 09:19

## 2024-01-12 RX ADMIN — ATORVASTATIN CALCIUM 40 MG: 40 TABLET, FILM COATED ORAL at 09:20

## 2024-01-12 RX ADMIN — SODIUM CHLORIDE, PRESERVATIVE FREE 5 ML: 5 INJECTION INTRAVENOUS at 06:02

## 2024-01-12 RX ADMIN — METHYLPREDNISOLONE SODIUM SUCCINATE 20 MG: 40 INJECTION, POWDER, FOR SOLUTION INTRAMUSCULAR; INTRAVENOUS at 09:20

## 2024-01-12 RX ADMIN — LANSOPRAZOLE 30 MG: 30 TABLET, ORALLY DISINTEGRATING ORAL at 16:46

## 2024-01-12 RX ADMIN — HYDROXYZINE HYDROCHLORIDE 10 MG: 10 TABLET ORAL at 01:11

## 2024-01-12 RX ADMIN — METOCLOPRAMIDE HYDROCHLORIDE 10 MG: 5 INJECTION INTRAMUSCULAR; INTRAVENOUS at 06:02

## 2024-01-12 RX ADMIN — HYDROMORPHONE HYDROCHLORIDE 0.5 MG: 1 INJECTION, SOLUTION INTRAMUSCULAR; INTRAVENOUS; SUBCUTANEOUS at 16:46

## 2024-01-12 ASSESSMENT — PAIN DESCRIPTION - ORIENTATION
ORIENTATION: RIGHT;LEFT
ORIENTATION: RIGHT;LEFT;MID
ORIENTATION: RIGHT;LEFT

## 2024-01-12 ASSESSMENT — PAIN DESCRIPTION - LOCATION
LOCATION: GENERALIZED;HEAD
LOCATION: ABDOMEN;HEAD
LOCATION: ABDOMEN;HEAD

## 2024-01-12 ASSESSMENT — PAIN SCALES - GENERAL
PAINLEVEL_OUTOF10: 10
PAINLEVEL_OUTOF10: 10
PAINLEVEL_OUTOF10: 9

## 2024-01-12 ASSESSMENT — PAIN DESCRIPTION - PAIN TYPE: TYPE: CHRONIC PAIN

## 2024-01-12 ASSESSMENT — PAIN DESCRIPTION - DESCRIPTORS
DESCRIPTORS: ACHING
DESCRIPTORS: ACHING;PRESSURE;SHOOTING
DESCRIPTORS: ACHING

## 2024-01-12 ASSESSMENT — PAIN - FUNCTIONAL ASSESSMENT
PAIN_FUNCTIONAL_ASSESSMENT: ACTIVITIES ARE NOT PREVENTED

## 2024-01-12 ASSESSMENT — PAIN SCALES - WONG BAKER: WONGBAKER_NUMERICALRESPONSE: 6

## 2024-01-12 NOTE — CARE COORDINATION
Pt is on Vapotherm at 35L with FiO2 at 70%.  Hospice nurse informed this CM that Dr Wong will be in tomorrow to meet with pt and family. They would like to possibly take pt home with Hospice.  Dr Wong to notify this CM of plan.  TE

## 2024-01-12 NOTE — CARE COORDINATION
Dr Bernal notified this CM that pt's son would like to have an informational visit with Hospice.  He informed CM that son does not have a Hospice preference.  Referral made to [place]  He will reach out to son to arrange a meeting.  TE

## 2024-01-12 NOTE — TELEPHONE ENCOUNTER
Pt remains inpt [facility], pt's nurse Melissa called to report pt requiring more assistance with oxygen and is not stable for transport to [facility] for radiation. Radiation cancelled today, will re evaluate on Monday 1/15/2024. Dr. Bond and RT staff aware.

## 2024-01-13 LAB
1,3 BETA-D-GLUCAN INTERP: POSITIVE
1,3 BETA-D-GLUCAN: >500 PG/ML
CRP SERPL HS-MCNC: 236.5 MG/L
GLUCOSE BLD-MCNC: 105 MG/DL (ref 70–99)
GLUCOSE BLD-MCNC: 108 MG/DL (ref 70–99)
GLUCOSE BLD-MCNC: 122 MG/DL (ref 70–99)
GLUCOSE BLD-MCNC: 127 MG/DL (ref 70–99)

## 2024-01-13 PROCEDURE — 6360000002 HC RX W HCPCS: Performed by: INTERNAL MEDICINE

## 2024-01-13 PROCEDURE — 43762 RPLC GTUBE NO REVJ TRC: CPT

## 2024-01-13 PROCEDURE — 6370000000 HC RX 637 (ALT 250 FOR IP): Performed by: INTERNAL MEDICINE

## 2024-01-13 PROCEDURE — 6360000002 HC RX W HCPCS: Performed by: PHYSICIAN ASSISTANT

## 2024-01-13 PROCEDURE — 36415 COLL VENOUS BLD VENIPUNCTURE: CPT

## 2024-01-13 PROCEDURE — 6370000000 HC RX 637 (ALT 250 FOR IP): Performed by: STUDENT IN AN ORGANIZED HEALTH CARE EDUCATION/TRAINING PROGRAM

## 2024-01-13 PROCEDURE — 86140 C-REACTIVE PROTEIN: CPT

## 2024-01-13 PROCEDURE — 94761 N-INVAS EAR/PLS OXIMETRY MLT: CPT

## 2024-01-13 PROCEDURE — 2140000000 HC CCU INTERMEDIATE R&B

## 2024-01-13 PROCEDURE — 82962 GLUCOSE BLOOD TEST: CPT

## 2024-01-13 PROCEDURE — 2580000003 HC RX 258: Performed by: INTERNAL MEDICINE

## 2024-01-13 PROCEDURE — 94640 AIRWAY INHALATION TREATMENT: CPT

## 2024-01-13 PROCEDURE — 51702 INSERT TEMP BLADDER CATH: CPT

## 2024-01-13 PROCEDURE — 6370000000 HC RX 637 (ALT 250 FOR IP): Performed by: FAMILY MEDICINE

## 2024-01-13 RX ORDER — SCOLOPAMINE TRANSDERMAL SYSTEM 1 MG/1
1 PATCH, EXTENDED RELEASE TRANSDERMAL
Status: DISCONTINUED | OUTPATIENT
Start: 2024-01-13 | End: 2024-01-16 | Stop reason: HOSPADM

## 2024-01-13 RX ADMIN — METOCLOPRAMIDE HYDROCHLORIDE 10 MG: 5 INJECTION INTRAMUSCULAR; INTRAVENOUS at 12:20

## 2024-01-13 RX ADMIN — HYDROMORPHONE HYDROCHLORIDE 0.5 MG: 1 INJECTION, SOLUTION INTRAMUSCULAR; INTRAVENOUS; SUBCUTANEOUS at 12:20

## 2024-01-13 RX ADMIN — METOPROLOL TARTRATE 75 MG: 50 TABLET, FILM COATED ORAL at 23:19

## 2024-01-13 RX ADMIN — METOPROLOL TARTRATE 75 MG: 50 TABLET, FILM COATED ORAL at 00:14

## 2024-01-13 RX ADMIN — ACETAMINOPHEN 650 MG: 325 TABLET ORAL at 07:23

## 2024-01-13 RX ADMIN — SENNOSIDES, DOCUSATE SODIUM 2 TABLET: 8.6; 5 TABLET ORAL at 23:19

## 2024-01-13 RX ADMIN — ESCITALOPRAM OXALATE 20 MG: 10 TABLET, FILM COATED ORAL at 07:46

## 2024-01-13 RX ADMIN — LORAZEPAM 1 MG: 1 TABLET ORAL at 07:23

## 2024-01-13 RX ADMIN — LANSOPRAZOLE 30 MG: 30 TABLET, ORALLY DISINTEGRATING ORAL at 16:58

## 2024-01-13 RX ADMIN — HYDROMORPHONE HYDROCHLORIDE 0.5 MG: 1 INJECTION, SOLUTION INTRAMUSCULAR; INTRAVENOUS; SUBCUTANEOUS at 07:22

## 2024-01-13 RX ADMIN — BUSPIRONE HYDROCHLORIDE 30 MG: 15 TABLET ORAL at 07:45

## 2024-01-13 RX ADMIN — SODIUM CHLORIDE, PRESERVATIVE FREE 5 ML: 5 INJECTION INTRAVENOUS at 06:51

## 2024-01-13 RX ADMIN — HYDROMORPHONE HYDROCHLORIDE 0.25 MG: 1 INJECTION, SOLUTION INTRAMUSCULAR; INTRAVENOUS; SUBCUTANEOUS at 23:17

## 2024-01-13 RX ADMIN — LANSOPRAZOLE 30 MG: 30 TABLET, ORALLY DISINTEGRATING ORAL at 06:51

## 2024-01-13 RX ADMIN — SODIUM CHLORIDE, PRESERVATIVE FREE 10 ML: 5 INJECTION INTRAVENOUS at 07:46

## 2024-01-13 RX ADMIN — SENNOSIDES, DOCUSATE SODIUM 2 TABLET: 8.6; 5 TABLET ORAL at 00:15

## 2024-01-13 RX ADMIN — ATORVASTATIN CALCIUM 40 MG: 40 TABLET, FILM COATED ORAL at 07:45

## 2024-01-13 RX ADMIN — ACETYLCYSTEINE 600 MG: 200 SOLUTION ORAL; RESPIRATORY (INHALATION) at 09:10

## 2024-01-13 RX ADMIN — TIZANIDINE 2 MG: 2 TABLET ORAL at 00:14

## 2024-01-13 RX ADMIN — SODIUM CHLORIDE, PRESERVATIVE FREE 10 ML: 5 INJECTION INTRAVENOUS at 23:21

## 2024-01-13 RX ADMIN — HYDROXYZINE HYDROCHLORIDE 10 MG: 10 TABLET ORAL at 23:19

## 2024-01-13 RX ADMIN — METOCLOPRAMIDE HYDROCHLORIDE 10 MG: 5 INJECTION INTRAMUSCULAR; INTRAVENOUS at 06:50

## 2024-01-13 RX ADMIN — METOCLOPRAMIDE HYDROCHLORIDE 10 MG: 5 INJECTION INTRAMUSCULAR; INTRAVENOUS at 23:19

## 2024-01-13 RX ADMIN — HYDROMORPHONE HYDROCHLORIDE 0.25 MG: 1 INJECTION, SOLUTION INTRAMUSCULAR; INTRAVENOUS; SUBCUTANEOUS at 17:13

## 2024-01-13 RX ADMIN — POLYETHYLENE GLYCOL (3350) 17 G: 17 POWDER, FOR SOLUTION ORAL at 07:46

## 2024-01-13 RX ADMIN — METHYLPREDNISOLONE SODIUM SUCCINATE 20 MG: 40 INJECTION, POWDER, FOR SOLUTION INTRAMUSCULAR; INTRAVENOUS at 07:46

## 2024-01-13 RX ADMIN — METHYLPREDNISOLONE SODIUM SUCCINATE 20 MG: 40 INJECTION, POWDER, FOR SOLUTION INTRAMUSCULAR; INTRAVENOUS at 00:11

## 2024-01-13 RX ADMIN — IPRATROPIUM BROMIDE AND ALBUTEROL SULFATE 1 DOSE: 2.5; .5 SOLUTION RESPIRATORY (INHALATION) at 22:47

## 2024-01-13 RX ADMIN — METHYLPREDNISOLONE SODIUM SUCCINATE 20 MG: 40 INJECTION, POWDER, FOR SOLUTION INTRAMUSCULAR; INTRAVENOUS at 23:19

## 2024-01-13 RX ADMIN — SODIUM CHLORIDE, PRESERVATIVE FREE 10 ML: 5 INJECTION INTRAVENOUS at 00:14

## 2024-01-13 RX ADMIN — TIZANIDINE 2 MG: 2 TABLET ORAL at 23:19

## 2024-01-13 RX ADMIN — METOPROLOL TARTRATE 75 MG: 50 TABLET, FILM COATED ORAL at 07:46

## 2024-01-13 RX ADMIN — METOCLOPRAMIDE HYDROCHLORIDE 10 MG: 5 INJECTION INTRAMUSCULAR; INTRAVENOUS at 00:11

## 2024-01-13 RX ADMIN — HYDROMORPHONE HYDROCHLORIDE 0.25 MG: 1 INJECTION, SOLUTION INTRAMUSCULAR; INTRAVENOUS; SUBCUTANEOUS at 00:12

## 2024-01-13 RX ADMIN — BUDESONIDE AND FORMOTEROL FUMARATE DIHYDRATE 2 PUFF: 160; 4.5 AEROSOL RESPIRATORY (INHALATION) at 22:49

## 2024-01-13 RX ADMIN — POLYETHYLENE GLYCOL (3350) 17 G: 17 POWDER, FOR SOLUTION ORAL at 23:21

## 2024-01-13 RX ADMIN — POLYETHYLENE GLYCOL (3350) 17 G: 17 POWDER, FOR SOLUTION ORAL at 00:15

## 2024-01-13 RX ADMIN — SENNOSIDES, DOCUSATE SODIUM 2 TABLET: 8.6; 5 TABLET ORAL at 07:46

## 2024-01-13 RX ADMIN — RIVAROXABAN 20 MG: 20 TABLET, FILM COATED ORAL at 16:58

## 2024-01-13 RX ADMIN — IPRATROPIUM BROMIDE AND ALBUTEROL SULFATE 1 DOSE: 2.5; .5 SOLUTION RESPIRATORY (INHALATION) at 09:10

## 2024-01-13 ASSESSMENT — PAIN DESCRIPTION - LOCATION
LOCATION: HEAD
LOCATION: ABDOMEN;HEAD
LOCATION: HEAD
LOCATION: ABDOMEN
LOCATION: HEAD;GENERALIZED

## 2024-01-13 ASSESSMENT — PAIN DESCRIPTION - DESCRIPTORS
DESCRIPTORS: ACHING
DESCRIPTORS: ACHING
DESCRIPTORS: PRESSURE;ACHING;THROBBING
DESCRIPTORS: ACHING
DESCRIPTORS: ACHING

## 2024-01-13 ASSESSMENT — PAIN DESCRIPTION - PAIN TYPE: TYPE: ACUTE PAIN;CHRONIC PAIN

## 2024-01-13 ASSESSMENT — PAIN DESCRIPTION - ORIENTATION
ORIENTATION: ANTERIOR;POSTERIOR
ORIENTATION: RIGHT;LEFT
ORIENTATION: ANTERIOR
ORIENTATION: RIGHT;LEFT

## 2024-01-13 ASSESSMENT — PAIN SCALES - GENERAL
PAINLEVEL_OUTOF10: 8
PAINLEVEL_OUTOF10: 9
PAINLEVEL_OUTOF10: 10
PAINLEVEL_OUTOF10: 8
PAINLEVEL_OUTOF10: 8
PAINLEVEL_OUTOF10: 9

## 2024-01-13 ASSESSMENT — PAIN DESCRIPTION - ONSET: ONSET: ON-GOING

## 2024-01-13 ASSESSMENT — PAIN DESCRIPTION - FREQUENCY: FREQUENCY: CONTINUOUS

## 2024-01-13 NOTE — CONSULTS
[place] [address] [place] [id number]                                  CONSULTATION    PATIENT NAME: PRAVEEN DARLING                  :        1945  MED REC NO:   [id number]                          ROOM:       3108  ACCOUNT NO:   [id number]                           ADMIT DATE: 2024  PROVIDER:     Chinedu Carranza MD    CONSULT DATE:  2024    HISTORY OF PRESENT ILLNESS:  The patient is an 78-year-old gentleman  with multiple medical problems including metastatic squamous cell  carcinoma of the lung; atrial flutter; COPD; chronic respiratory  failure, on home oxygen; hypertension; hyperlipidemia, who was admitted  through the emergency room with complaints of increasing shortness of  breath, fatigue, and poor oral intake.  The patient denied any  hemoptysis.  He denied any fever or chills.  He denied any nausea or  vomiting.  The patient has been having problems swallowing.  In the  emergency room, he was hypoxic and was requiring high oxygen  concentration.  His venous blood gases showed a pH of 7.16, pCO2 of 82,  pO2 of 41 with 72% saturation.  His respiratory disease panel PCR was  negative.  He was given breathing treatment, started on steroids and  antibiotics, and was admitted to the hospital.    PAST MEDICAL HISTORY:  Significant for COPD; chronic respiratory  failure, on home oxygen; metastatic CA of the lung; atrial flutter;  hypertension; hyperlipidemia.    PAST SURGICAL HISTORY:  Remarkable for atrial ablation surgery,  colonoscopy, needle biopsy of the lung, hernia repair.    FAMILY HISTORY:  Reveals that his brother has hypertension.    SOCIAL HISTORY:  Reveals that he quit smoking in , but used to smoke  half a pack per day for 40 years prior to that.  He has a history of  alcohol intake in the past, but does not drink anymore.  No history of  drug abuse.    MEDICATIONS:  His medications were 
     Nutrition Note    Dietitian consulted to order and manage EN. Will order EN based on dietitian assessment from yesterday (recommendations/plan copied below, full assessment in note from yesterday). Current lytes are wnl and pt with 1/2 NS running at 50 mL/hr at time of room visit. POC d/w pt and RN at bedside. Will order EN to start at 1700. Will order standard water flushes 30 mL Q4H, prn, can inrease water flushes once IVF d/c'd. Will continue monitoring pt at high nutrition risk.     Nutrition Recommendations/Plan:   Continue current diet as able, provides some hydration only  Once PEG ready to use, recommend starting Glucerna 1.5 Shayan (diabetic formula) progressed slowly as tolerated to 60 ml/hr to provide 2160 kcal, 119 gm protein  Monitor/replace depleted electrolytes carefully as pt at risk for refeeding syndrome.         Electronically signed by Miladis Marshall RD, NIGEL on 1/6/24 at 1:06 PM EST    Contact: [phone]      
Consult completed without need for additional IV access at this time. On assessment, existing #18ga IV to RUE upper forearm has a loose hub - hub tightened & dressing changed; site is patent and flushed /c 30ml NS /s abnormalities, pain, leaking, or other s/s of loss of patency. Pt denies other c/o or needs. RN notified.   
Consult completed. Nexiva 18g 1.75\" peripheral IV initiated into right forearm x 1 attempt using ultrasound guided technique. Flushes without resistance. Labs requested per lab, but unable to draw off newly placed IV sufficient enough for labs; I did not want to compromise newly placed IV d/t patient having limited area for IV's d/t LIMB ALERT on left side d/t DVT. Patient tolerated well. Primary nurse CALLIE Miller notified.       Consult the Vascular Access Team for questions, concerns, or change in patient's condition.         Labs obtained via US Guidance.       
Consult completed. Procedure/rationale explained to pt & consent obtained. Nexiva 20g 1.75\" PIV initiated using UltraSound-guided technique without difficulty/complications. Pt tolerated well and no other c/o or needs noted or reported. Consult IV/PICC team if patient's needs change.      
Dict [id number]  
Hematology Oncology Consult Note    Patient Name:  Mateo Street  Patient :  1945  Patient MRN:  [id number]     Primary Oncologist: Dr Codi Sprague, Dr Fran Bond (Rad Onc)  Referring Provider: Salvador Riley MD     Date of Service: 2024      Reason for Consult: metastatic NSCLC, known to service     Chief Complaint:    Chief Complaint   Patient presents with    Fatigue    Shortness of Breath     Principal Problem:    Sepsis (HCC)  Resolved Problems:    * No resolved hospital problems. *    HPI:   Mateo Street is a pleasant 78 y.o. 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  Plan has been to start weekly Carbo/Taxol with palliative radiation including to the mediastinum.  He has been struggling with headaches and head and neck pain related to metastatic disease.  Note that he does not have intraparenchymal brain mass however has possible extradural lesion as well as lesions in the cervical spine.  He presented this admission with acute hypoxic/hypercapnic respiratory failure secondary to aspiration pneumonitis and possible COPD exacerbation in the setting of lung cancer.  He is currently being treated for pneumonia.  He is also have very poor appetite and ongoing weight loss and weakness.  Continues to complain of headaches and neck pain.  Had planned to start palliative radiation on 2024.  Dr. Bond did evaluate need inpatient and recommends starting as an inpatient and has reached out to hospital administration to advocate that this is done.  Patient was also evaluated by GI for possible PEG placement, was awaiting improvement of respiratory status.    On exam today patient is fatigued with general malaise.  Oxygenation is stable on 3 L nasal cannula and SOB has improved.  Hemodynamically stable. Feels that food is stuck as he is swallowing and is progressively worsening. He is agreeable 
IV Consult complete.  Nexiva 20g 1.75\" PIV Inserted in Left Forearm  x 1 attempt using sterile ultrasound guided technique.  Brisk blood return, flushes easy.        
[facility] ACUTE CARE PHYSICAL THERAPY EVALUATION  Mateo Street, 1945, 3108/3108-A, 1/4/2024    Assessment:  Patient admitted for sepsis, resp fail, SIRS, and atrial flutter in setting of metaststic lung cancer  with unstable/unpredictable medical features and evolving rehabilitative features.  Good effort with eval, significant activity intolerance and new functional decline which amplifies fall risk.  Body Structures, Functions, Activity Limitations Requiring Skilled Therapeutic Intervention: Decreased functional mobility , Decreased balance, Decreased safe awareness, Decreased strength, Decreased tolerance to work activity, Decreased ADL status, Decreased high-level IADLs, Decreased endurance.  While in hospital, skilled physical therapy services are appropriate.  There are no significant educational impairments or barriers to learning which prevent participation with service. Prognosis: guarded.  Clinical decision making:  high complexity:  hx 3+ personal factors/comorbidities, exam tests and measures for 3+ elements of body/structures/functions/activity limitation/participation restriction, unstable/unpredictable presentation.    Discharge recommendations:  Patient has moderate post-acute physical therapy service needs. Encourage residential facility for post-acute rehabilitation, anticipate 1-2 hours per day and 5 days per week. Durable medical equipment recommendations include:  front-wheeled rolling walker.       Plan/Goals:  Achieve goals within two weeks.    bed mobility and sup-sit independently  sit-stand and SPT independently  ambulate 150 ft with CGA and least restrictive device  ambulate 50 feet with modified independence and least restrictive device  independent with HEP  independent recall of signs and symptoms of cardiac intolerance.    Physical Therapy Plan  General Plan: 3-5 times per week.  Acute care physical therapy treatment to include patient/caregiver education, 
PHARMACY VANCOMYCIN MONITORING SERVICE  Pharmacy consulted by Dr.Azeez Somers for monitoring and adjustment.    Indication for treatment: Vancomycin indication: HAP  Goal trough: Trough Goal: 15-20 mcg/mL  AUC/DEBI: <500    Risk Factors for MRSA Identified:   Received IV antibiotics within the past 90 days, Purulent and/or complicated SSTI    Pertinent Laboratory Values:   Temp Readings from Last 3 Encounters:   01/03/24 98.5 °F (36.9 °C) (Oral)   12/30/23 97.7 °F (36.5 °C) (Axillary)   12/21/23 97.5 °F (36.4 °C) (Temporal)     Recent Labs     01/02/24  1549 01/02/24  1657 01/03/24  0946   WBC 14.7*  --  14.2*   LACTATE  --  1.3  --      Recent Labs     01/02/24  1549 01/03/24  0946   BUN 30* 32*   CREATININE 0.9 0.8*     Estimated Creatinine Clearance: 68 mL/min (A) (based on SCr of 0.8 mg/dL (L)).  No intake or output data in the 24 hours ending 01/03/24 1656    Pertinent Cultures:   Date    Source    Results  01/02   Blood1               In process  1/3                              Nasal MRSA by PCR              In process  1/3                              Resp panel                              Negative  1/3                              Resp culture                            In process  1/3                              Strep pneumo/Legionella        Ordered  1/3                             Urine                                         Ordered    Vancomycin level:   TROUGH:  No results for input(s): \"VANCOTROUGH\" in the last 72 hours.  RANDOM:  No results for input(s): \"VANCORANDOM\" in the last 72 hours.    Assessment:  HPI: Presents to ER with Cough, congestion/SOB/Fatigue/ general Malaise and HA. He has COPD  O2 at home at 3L.  SIRS 2/4 vs sepsis 2/2 bacterial pneumonia.  SCr, BUN, and urine output: Scr close to baseline @0.8, BUN slightly elevated @32, no UOP data  Day(s) of therapy: 1 of 7  Vancomycin concentration:  1/4- trough @05:00    Plan:  Renal trends at baseline.  Continue vancomycin 750mg ivpb q12h for 
  AST 12*   ALT 10   BILITOT 0.3   ALKPHOS 118     No results for input(s): \"TROPONINT\" in the last 72 hours.    Recent Labs     01/02/24  1549 01/04/24  0112   PROBNP 905.7* 1,243*     Lab Results   Component Value Date    INR 1.2 01/04/2024    PROTIME 15.5 (H) 01/04/2024       EKG: (reviewed by myself): His EKG showed atrial flutter with 2-1 block with a ventricular rate of 120 bpm.    ECHO:(reviewed by myself) there is no recent echocardiogram on file    Chest Xray:(reviewed by myself): His chest x-ray did not show any acute cardiopulmonary disease      All labs, medications and tests reviewed by myself including data  from outside source , patient and available family .  Continue all other medications of all above medical condition listed as is.     Impression and Recommendations:    Atrial flutter with rapid ventricular rate  Aspiration pneumonitis  Acute on chronic hypoxemic respiratory failure  Malnutrition  Elevated troponin      78 y.o.year old with above medical history.  His ventricular rate is likely uncontrolled in setting of acute respiratory failure.  His diltiazem is held in view of hypotension.  At present we will recommend to start him on digoxin.  We will load him with 0.5 mg IV x 1 followed by 0.125 mg daily.  When hemodynamically stable and he can take his diltiazem we will monitor his heart rate closely and if needed digoxin can then be stopped.  His Xarelto can be resumed when feasible per primary team.    Thank you  much for consult and giving us the opportunity in contributing in the care of this patient. Please feel free to call me for any questions.       Sridhar Cabrera MD, 1/5/2024 2:48 PM     
Transportation (Medical): No     Lack of Transportation (Non-Medical): No   Physical Activity: Not on file   Stress: Not on file   Social Connections: Not on file   Intimate Partner Violence: Not on file   Housing Stability: Low Risk  (1/2/2024)    Housing Stability Vital Sign     Unable to Pay for Housing in the Last Year: No     Number of Places Lived in the Last Year: 1     Unstable Housing in the Last Year: No           ALLERGIES: No Known Allergies     Current Facility-Administered Medications   Medication Dose Route Frequency Provider Last Rate Last Admin    [Held by provider] dilTIAZem (CARDIZEM CD) extended release capsule 240 mg  240 mg Oral Daily Mitch Somers MD        metoprolol (LOPRESSOR) injection 5 mg  5 mg IntraVENous Q6H Mitch Somers MD   5 mg at 01/04/24 0626    enoxaparin (LOVENOX) injection 70 mg  1 mg/kg SubCUTAneous BID Mitch Somers MD   70 mg at 01/03/24 2158    methylPREDNISolone sodium succ (SOLU-MEDROL) 40 mg in sterile water 1 mL injection  40 mg IntraVENous Q8H Chinedu Carranza MD   40 mg at 01/03/24 2158    pantoprazole (PROTONIX) injection 40 mg  40 mg IntraVENous Daily Mitch Somers MD   40 mg at 01/03/24 1325    diphenhydrAMINE (BENADRYL) injection 25 mg  25 mg IntraVENous Q6H Puma Canales MD   25 mg at 01/03/24 2322    sodium chloride 0.9 % bolus 1,000 mL  1,000 mL IntraVENous Once Mitch Somers MD        acetaminophen (TYLENOL) tablet 1,000 mg  1,000 mg Oral Once Mitch Somers MD        atorvastatin (LIPITOR) tablet 40 mg  40 mg Oral Daily Mitch Somers MD   40 mg at 01/03/24 0903    busPIRone (BUSPAR) tablet 30 mg  30 mg Oral Daily Mitch Somers MD   30 mg at 01/03/24 0903    escitalopram (LEXAPRO) tablet 20 mg  20 mg Oral Daily Mitch Somers MD   20 mg at 01/03/24 0903    [Held by provider] rivaroxaban (XARELTO) tablet 20 mg  20 mg Oral Daily Mitch Somers MD        tiZANidine (ZANAFLEX) tablet 2 mg  2 mg Oral Nightly Mitch Somers MD   2 mg at 01/02/24 2446    
breathing heavy.   Head: Normocephalic, without obvious abnormality, atraumatic  Eyes: conjunctivae/corneas clear. PERRL, EOM's intact.   Ears: normal external  ears  Neck: no adenopathy, no carotid bruit, no JVD, supple, symmetrical, trachea midline and thyroid not enlarged, no tenderness/mass/nodules  Lungs: poor air entry. Crackles in lung base  Heart: regular rate and rhythm, S1, S2 normal, no murmur, regular rate and rhythm   Abdomen:soft, non-tender; non-distended normal bowel sounds no masses, no organomegaly  Extremities:Pedal edema none  Homans sign is negative, no sign of DVT, +2 edema right hand.   Skin: cold skin, edema and bruise on right upper ext  Neurologic:  partially oriented       CBC with Differential:    Lab Results   Component Value Date/Time    WBC 18.6 01/10/2024 04:59 AM    RBC 3.65 01/10/2024 04:59 AM    HGB 11.7 01/10/2024 04:59 AM    HCT 37.4 01/10/2024 04:59 AM     01/10/2024 04:59 AM    .5 01/10/2024 04:59 AM    SEGSPCT 70.4 01/10/2024 04:59 AM    LYMPHOPCT 22.1 01/10/2024 04:59 AM    EOSPCT 1.7 01/19/2018 12:00 AM    DIFFTYPE AUTOMATED DIFFERENTIAL 01/10/2024 04:59 AM     BMP:    Lab Results   Component Value Date/Time     01/10/2024 04:59 AM    K 5.1 01/10/2024 04:59 AM    CL 97 01/10/2024 04:59 AM    CO2 29 01/10/2024 04:59 AM    BUN 32 01/10/2024 04:59 AM    CREATININE 0.7 01/10/2024 04:59 AM    CALCIUM 9.1 01/10/2024 04:59 AM    GFRAA >60 02/15/2021 05:40 AM    LABGLOM >60 01/10/2024 04:59 AM    GLUCOSE 138 01/10/2024 04:59 AM     PT/INR:  No results found for: \"PTINR\"    ASSESSMENT / PLAN:   1, Metastatic non small cell lung cancer  2. 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 Acute on chronic respiratory failure  4. COPD exacerbation  5. Core pulmonale  7. Protein calorie malnutrition    Plan  Continue supportive care  Wean off of vapotherm  Discussed with family  Possible discharge home with hospice in couple days    Disposition:   Marc Wong MD  
History of cardioversion 2014    Hx of echocardiogram 4/15/2014    sclerotic aortic valve, moderate pulmonary HTN, EF45-50%    Hyperlipidemia     Hypertension     Obesity       Past Surgical History:   Procedure Laterality Date    ATRIAL ABLATION SURGERY  10/2006    ATRIAL ABLATION SURGERY  10/06/2006    Bullous emphysema. No change compared with the previous exam.    COLONOSCOPY  2015    severe divertics, multiple polyps, hem    CT NEEDLE BIOPSY LUNG PERCUTANEOUS  2023    CT NEEDLE BIOPSY LUNG PERCUTANEOUS 2023 [facility] CT SCAN    GASTROSTOMY TUBE PLACEMENT N/A 2024    EGD PEG TUBE PLACEMENT performed by Ventura Qureshi MD at [facility] ENDOSCOPY    HERNIA REPAIR      INFECTED SKIN DEBRIDEMENT  2012      Family History   Problem Relation Age of Onset    High Blood Pressure Brother       Infectious disease related family history - not contibutory.   SOCIAL HISTORY  Social History     Tobacco Use    Smoking status: Former     Current packs/day: 0.00     Average packs/day: 0.5 packs/day for 40.0 years (20.0 ttl pk-yrs)     Types: Cigarettes     Start date: 1962     Quit date: 2002     Years since quittin.6    Smokeless tobacco: Never   Substance Use Topics    Alcohol use: Not Currently      Born:[place]  Lives:[place] alone  Occupation:Retired from [employer]  No recent travel of significance.  No recent unusual exposures.  NO pets    ?  ALLERGIES  No Known Allergies   MEDICATIONS  Reviewed and are per the chart/EMR.   ?  Antibiotics:   Present:    Past:?  Vancomycin /-4  Cefepime 1/2-8  -------------------------------------------------------------------------------------------------------------------    Vital Signs:  Vitals:    01/10/24 0500   BP: 118/71   Pulse: 74   Resp: 20   Temp:    SpO2:          Exam:    VS: noted; wt 142 lb (64.5 kg) Height 5'9\"  Gen: alert and oriented X3, no distress  Skin: no stigmata of endocarditis  Wounds: C/D/I-NA  HEMT: 
(PRILOSEC) 20 MG delayed release capsule Take 1 capsule by mouth daily 11/13/23   Iftikhar Alexis APRN - CNP   butalbital-acetaminophen-caffeine (FIORICET, ESGIC) -40 MG per tablet Take 1 tablet by mouth every 4 hours as needed for Headaches 10/11/23   Alycia Douglas APRN - BRETT   fluticasone-umeclidin-vilant (TRELEGY ELLIPTA) 100-62.5-25 MCG/INH AEPB Inhale 1 puff into the lungs daily    Connie Edwards MD   busPIRone (BUSPAR) 30 MG tablet Take 30 mg by mouth daily    Connie Edwards MD   rivaroxaban (XARELTO) 20 MG TABS tablet Take 1 tablet by mouth Daily. **Pt started on 4/15/14 w/samples.** 5/13/14   Felix Ridley MD   atorvastatin (LIPITOR) 40 MG tablet Take 1 tablet by mouth daily    Connie Edwards MD   dilTIAZem (CARDIZEM CD) 240 MG extended release capsule Take 1 capsule by mouth daily    Connie Edwards MD   escitalopram (LEXAPRO) 20 MG tablet Take 1 tablet by mouth daily    Connie Edwards MD   OXYGEN 2.5 L nightly.      ProviderConnie MD        Scheduled Medications:    [Held by provider] dilTIAZem  240 mg Oral Daily    metoprolol  5 mg IntraVENous Q6H    enoxaparin  1 mg/kg SubCUTAneous BID    sodium chloride  1,000 mL IntraVENous Once    acetaminophen  1,000 mg Oral Once    atorvastatin  40 mg Oral Daily    busPIRone  30 mg Oral Daily    escitalopram  20 mg Oral Daily    pantoprazole  40 mg Oral QAM AC    [Held by provider] rivaroxaban  20 mg Oral Daily    tiZANidine  2 mg Oral Nightly    ipratropium 0.5 mg-albuterol 2.5 mg  1 Dose Inhalation Q4H RT    sodium chloride flush  5-40 mL IntraVENous 2 times per day    methylPREDNISolone  40 mg IntraVENous Q12H    acetylcysteine  600 mg Inhalation BID RT    vancomycin  750 mg IntraVENous Q12H    cefepime  2,000 mg IntraVENous Q8H    budesonide-formoterol  2 puff Inhalation BID RT    tiotropium  2 puff Inhalation Daily RT     Infusions:    sodium chloride      sodium chloride 50 mL/hr at 01/03/24 1047

## 2024-01-13 NOTE — FLOWSHEET NOTE
PT O2 sat is 95%.  He is alert and has no C/O - Son at bedside. Hospice at bedside speaking to pt and son.

## 2024-01-13 NOTE — FLOWSHEET NOTE
Rapid resource RN called to bedside- Pt Sats are in the 60's.  Pt O2 increased to 15lt NC.  Dr. Dolores humphrey served- awaiting response.  Pt is Alert and able to use the [place].  He has lots of secreations- milagro served Dr. Bernal about a Scopolamine patch.

## 2024-01-13 NOTE — CARE COORDINATION
Dr Wong/[facility] to meet with pt and family today.  Pt is off Vapotherm and is now on 6L/HF per flowsheet.  TE

## 2024-01-14 LAB
CRP SERPL HS-MCNC: 179 MG/L
CULTURE: NORMAL
GLUCOSE BLD-MCNC: 194 MG/DL (ref 70–99)
Lab: NORMAL
SPECIMEN: NORMAL

## 2024-01-14 PROCEDURE — 6370000000 HC RX 637 (ALT 250 FOR IP): Performed by: STUDENT IN AN ORGANIZED HEALTH CARE EDUCATION/TRAINING PROGRAM

## 2024-01-14 PROCEDURE — 82962 GLUCOSE BLOOD TEST: CPT

## 2024-01-14 PROCEDURE — 6370000000 HC RX 637 (ALT 250 FOR IP): Performed by: INTERNAL MEDICINE

## 2024-01-14 PROCEDURE — 6360000002 HC RX W HCPCS: Performed by: FAMILY MEDICINE

## 2024-01-14 PROCEDURE — 94150 VITAL CAPACITY TEST: CPT

## 2024-01-14 PROCEDURE — 2700000000 HC OXYGEN THERAPY PER DAY

## 2024-01-14 PROCEDURE — 6360000002 HC RX W HCPCS: Performed by: INTERNAL MEDICINE

## 2024-01-14 PROCEDURE — 6360000002 HC RX W HCPCS: Performed by: PHYSICIAN ASSISTANT

## 2024-01-14 PROCEDURE — 36415 COLL VENOUS BLD VENIPUNCTURE: CPT

## 2024-01-14 PROCEDURE — 2580000003 HC RX 258: Performed by: INTERNAL MEDICINE

## 2024-01-14 PROCEDURE — 86140 C-REACTIVE PROTEIN: CPT

## 2024-01-14 PROCEDURE — 94669 MECHANICAL CHEST WALL OSCILL: CPT

## 2024-01-14 PROCEDURE — 6370000000 HC RX 637 (ALT 250 FOR IP): Performed by: FAMILY MEDICINE

## 2024-01-14 PROCEDURE — 94640 AIRWAY INHALATION TREATMENT: CPT

## 2024-01-14 PROCEDURE — 94761 N-INVAS EAR/PLS OXIMETRY MLT: CPT

## 2024-01-14 PROCEDURE — 2140000000 HC CCU INTERMEDIATE R&B

## 2024-01-14 RX ORDER — LORAZEPAM 2 MG/ML
0.5 INJECTION INTRAMUSCULAR EVERY 6 HOURS PRN
Status: DISCONTINUED | OUTPATIENT
Start: 2024-01-14 | End: 2024-01-15

## 2024-01-14 RX ADMIN — HYDROMORPHONE HYDROCHLORIDE 0.25 MG: 1 INJECTION, SOLUTION INTRAMUSCULAR; INTRAVENOUS; SUBCUTANEOUS at 05:10

## 2024-01-14 RX ADMIN — HYDROXYZINE HYDROCHLORIDE 10 MG: 10 TABLET ORAL at 20:26

## 2024-01-14 RX ADMIN — SODIUM CHLORIDE, PRESERVATIVE FREE 10 ML: 5 INJECTION INTRAVENOUS at 05:12

## 2024-01-14 RX ADMIN — METOCLOPRAMIDE HYDROCHLORIDE 10 MG: 5 INJECTION INTRAMUSCULAR; INTRAVENOUS at 05:11

## 2024-01-14 RX ADMIN — IPRATROPIUM BROMIDE AND ALBUTEROL SULFATE 1 DOSE: 2.5; .5 SOLUTION RESPIRATORY (INHALATION) at 21:48

## 2024-01-14 RX ADMIN — RIVAROXABAN 20 MG: 20 TABLET, FILM COATED ORAL at 18:13

## 2024-01-14 RX ADMIN — HYDROMORPHONE HYDROCHLORIDE 0.5 MG: 1 INJECTION, SOLUTION INTRAMUSCULAR; INTRAVENOUS; SUBCUTANEOUS at 20:26

## 2024-01-14 RX ADMIN — METHYLPREDNISOLONE SODIUM SUCCINATE 20 MG: 40 INJECTION, POWDER, FOR SOLUTION INTRAMUSCULAR; INTRAVENOUS at 07:56

## 2024-01-14 RX ADMIN — BUSPIRONE HYDROCHLORIDE 30 MG: 15 TABLET ORAL at 07:55

## 2024-01-14 RX ADMIN — HYDROMORPHONE HYDROCHLORIDE 0.25 MG: 1 INJECTION, SOLUTION INTRAMUSCULAR; INTRAVENOUS; SUBCUTANEOUS at 14:50

## 2024-01-14 RX ADMIN — BUDESONIDE AND FORMOTEROL FUMARATE DIHYDRATE 2 PUFF: 160; 4.5 AEROSOL RESPIRATORY (INHALATION) at 09:01

## 2024-01-14 RX ADMIN — TIZANIDINE 2 MG: 2 TABLET ORAL at 20:26

## 2024-01-14 RX ADMIN — SODIUM CHLORIDE, PRESERVATIVE FREE 10 ML: 5 INJECTION INTRAVENOUS at 07:56

## 2024-01-14 RX ADMIN — LANSOPRAZOLE 30 MG: 30 TABLET, ORALLY DISINTEGRATING ORAL at 05:11

## 2024-01-14 RX ADMIN — METOCLOPRAMIDE HYDROCHLORIDE 10 MG: 5 INJECTION INTRAMUSCULAR; INTRAVENOUS at 23:22

## 2024-01-14 RX ADMIN — IPRATROPIUM BROMIDE AND ALBUTEROL SULFATE 1 DOSE: 2.5; .5 SOLUTION RESPIRATORY (INHALATION) at 09:01

## 2024-01-14 RX ADMIN — LORAZEPAM 0.5 MG: 2 INJECTION, SOLUTION INTRAMUSCULAR; INTRAVENOUS at 23:13

## 2024-01-14 RX ADMIN — METOPROLOL TARTRATE 75 MG: 50 TABLET, FILM COATED ORAL at 07:55

## 2024-01-14 RX ADMIN — ESCITALOPRAM OXALATE 20 MG: 10 TABLET, FILM COATED ORAL at 07:55

## 2024-01-14 RX ADMIN — ATORVASTATIN CALCIUM 40 MG: 40 TABLET, FILM COATED ORAL at 07:55

## 2024-01-14 RX ADMIN — METHYLPREDNISOLONE SODIUM SUCCINATE 20 MG: 40 INJECTION, POWDER, FOR SOLUTION INTRAMUSCULAR; INTRAVENOUS at 23:22

## 2024-01-14 RX ADMIN — LORAZEPAM 1 MG: 1 TABLET ORAL at 07:55

## 2024-01-14 RX ADMIN — BUDESONIDE AND FORMOTEROL FUMARATE DIHYDRATE 2 PUFF: 160; 4.5 AEROSOL RESPIRATORY (INHALATION) at 21:48

## 2024-01-14 RX ADMIN — METOPROLOL TARTRATE 75 MG: 50 TABLET, FILM COATED ORAL at 20:26

## 2024-01-14 RX ADMIN — LANSOPRAZOLE 30 MG: 30 TABLET, ORALLY DISINTEGRATING ORAL at 18:13

## 2024-01-14 ASSESSMENT — PAIN SCALES - GENERAL
PAINLEVEL_OUTOF10: 8
PAINLEVEL_OUTOF10: 10

## 2024-01-14 ASSESSMENT — PAIN DESCRIPTION - LOCATION
LOCATION: HEAD
LOCATION: HEAD;GENERALIZED
LOCATION: HEAD

## 2024-01-14 ASSESSMENT — PAIN DESCRIPTION - FREQUENCY: FREQUENCY: CONTINUOUS

## 2024-01-14 ASSESSMENT — PAIN DESCRIPTION - ORIENTATION
ORIENTATION: RIGHT;LEFT;MID
ORIENTATION: ANTERIOR;POSTERIOR

## 2024-01-14 ASSESSMENT — PAIN DESCRIPTION - DESCRIPTORS
DESCRIPTORS: ACHING;STABBING
DESCRIPTORS: DISCOMFORT;THROBBING;PRESSURE
DESCRIPTORS: ACHING;STABBING

## 2024-01-14 ASSESSMENT — PAIN DESCRIPTION - PAIN TYPE
TYPE: CHRONIC PAIN
TYPE: CHRONIC PAIN

## 2024-01-14 ASSESSMENT — PAIN SCALES - WONG BAKER: WONGBAKER_NUMERICALRESPONSE: 0

## 2024-01-15 LAB
CULTURE: NORMAL
CULTURE: NORMAL
Lab: NORMAL
Lab: NORMAL
SPECIMEN: NORMAL
SPECIMEN: NORMAL

## 2024-01-15 PROCEDURE — 6370000000 HC RX 637 (ALT 250 FOR IP): Performed by: INTERNAL MEDICINE

## 2024-01-15 PROCEDURE — 6370000000 HC RX 637 (ALT 250 FOR IP): Performed by: STUDENT IN AN ORGANIZED HEALTH CARE EDUCATION/TRAINING PROGRAM

## 2024-01-15 PROCEDURE — 6360000002 HC RX W HCPCS: Performed by: PHYSICIAN ASSISTANT

## 2024-01-15 PROCEDURE — 2580000003 HC RX 258: Performed by: INTERNAL MEDICINE

## 2024-01-15 PROCEDURE — 94640 AIRWAY INHALATION TREATMENT: CPT

## 2024-01-15 PROCEDURE — 6360000002 HC RX W HCPCS: Performed by: INTERNAL MEDICINE

## 2024-01-15 PROCEDURE — 99232 SBSQ HOSP IP/OBS MODERATE 35: CPT | Performed by: INTERNAL MEDICINE

## 2024-01-15 PROCEDURE — 6360000002 HC RX W HCPCS: Performed by: FAMILY MEDICINE

## 2024-01-15 PROCEDURE — 6370000000 HC RX 637 (ALT 250 FOR IP): Performed by: FAMILY MEDICINE

## 2024-01-15 PROCEDURE — 51702 INSERT TEMP BLADDER CATH: CPT

## 2024-01-15 PROCEDURE — 2140000000 HC CCU INTERMEDIATE R&B

## 2024-01-15 RX ORDER — LORAZEPAM 2 MG/ML
0.5 INJECTION INTRAMUSCULAR
Status: COMPLETED | OUTPATIENT
Start: 2024-01-15 | End: 2024-01-15

## 2024-01-15 RX ADMIN — BUDESONIDE AND FORMOTEROL FUMARATE DIHYDRATE 2 PUFF: 160; 4.5 AEROSOL RESPIRATORY (INHALATION) at 20:15

## 2024-01-15 RX ADMIN — SODIUM CHLORIDE, PRESERVATIVE FREE 10 ML: 5 INJECTION INTRAVENOUS at 21:19

## 2024-01-15 RX ADMIN — HYDROMORPHONE HYDROCHLORIDE 0.5 MG: 1 INJECTION, SOLUTION INTRAMUSCULAR; INTRAVENOUS; SUBCUTANEOUS at 19:14

## 2024-01-15 RX ADMIN — SODIUM CHLORIDE, PRESERVATIVE FREE 10 ML: 5 INJECTION INTRAVENOUS at 21:16

## 2024-01-15 RX ADMIN — SODIUM CHLORIDE, PRESERVATIVE FREE 10 ML: 5 INJECTION INTRAVENOUS at 23:01

## 2024-01-15 RX ADMIN — IPRATROPIUM BROMIDE AND ALBUTEROL SULFATE 1 DOSE: 2.5; .5 SOLUTION RESPIRATORY (INHALATION) at 20:15

## 2024-01-15 RX ADMIN — TIZANIDINE 2 MG: 2 TABLET ORAL at 21:17

## 2024-01-15 RX ADMIN — METOPROLOL TARTRATE 75 MG: 50 TABLET, FILM COATED ORAL at 21:17

## 2024-01-15 RX ADMIN — METHYLPREDNISOLONE SODIUM SUCCINATE 20 MG: 40 INJECTION, POWDER, FOR SOLUTION INTRAMUSCULAR; INTRAVENOUS at 08:59

## 2024-01-15 RX ADMIN — LANSOPRAZOLE 30 MG: 30 TABLET, ORALLY DISINTEGRATING ORAL at 18:09

## 2024-01-15 RX ADMIN — BUSPIRONE HYDROCHLORIDE 30 MG: 15 TABLET ORAL at 09:00

## 2024-01-15 RX ADMIN — HYDROMORPHONE HYDROCHLORIDE 0.5 MG: 1 INJECTION, SOLUTION INTRAMUSCULAR; INTRAVENOUS; SUBCUTANEOUS at 15:52

## 2024-01-15 RX ADMIN — HYDROMORPHONE HYDROCHLORIDE 0.5 MG: 1 INJECTION, SOLUTION INTRAMUSCULAR; INTRAVENOUS; SUBCUTANEOUS at 01:09

## 2024-01-15 RX ADMIN — METOPROLOL TARTRATE 75 MG: 50 TABLET, FILM COATED ORAL at 09:01

## 2024-01-15 RX ADMIN — LANSOPRAZOLE 30 MG: 30 TABLET, ORALLY DISINTEGRATING ORAL at 10:18

## 2024-01-15 RX ADMIN — LORAZEPAM 1 MG: 1 TABLET ORAL at 21:17

## 2024-01-15 RX ADMIN — SODIUM CHLORIDE, PRESERVATIVE FREE 10 ML: 5 INJECTION INTRAVENOUS at 09:00

## 2024-01-15 RX ADMIN — HYDROMORPHONE HYDROCHLORIDE 0.5 MG: 1 INJECTION, SOLUTION INTRAMUSCULAR; INTRAVENOUS; SUBCUTANEOUS at 11:33

## 2024-01-15 RX ADMIN — SENNOSIDES, DOCUSATE SODIUM 2 TABLET: 8.6; 5 TABLET ORAL at 21:17

## 2024-01-15 RX ADMIN — METHYLPREDNISOLONE SODIUM SUCCINATE 20 MG: 40 INJECTION, POWDER, FOR SOLUTION INTRAMUSCULAR; INTRAVENOUS at 21:15

## 2024-01-15 RX ADMIN — HYDROXYZINE HYDROCHLORIDE 10 MG: 10 TABLET ORAL at 18:09

## 2024-01-15 RX ADMIN — METOCLOPRAMIDE HYDROCHLORIDE 10 MG: 5 INJECTION INTRAMUSCULAR; INTRAVENOUS at 11:33

## 2024-01-15 RX ADMIN — ATORVASTATIN CALCIUM 40 MG: 40 TABLET, FILM COATED ORAL at 09:00

## 2024-01-15 RX ADMIN — RIVAROXABAN 20 MG: 20 TABLET, FILM COATED ORAL at 18:09

## 2024-01-15 RX ADMIN — METOCLOPRAMIDE HYDROCHLORIDE 10 MG: 5 INJECTION INTRAMUSCULAR; INTRAVENOUS at 21:16

## 2024-01-15 RX ADMIN — HYDROMORPHONE HYDROCHLORIDE 0.5 MG: 1 INJECTION, SOLUTION INTRAMUSCULAR; INTRAVENOUS; SUBCUTANEOUS at 23:00

## 2024-01-15 RX ADMIN — LORAZEPAM 0.5 MG: 2 INJECTION, SOLUTION INTRAMUSCULAR; INTRAVENOUS at 02:24

## 2024-01-15 RX ADMIN — HYDROMORPHONE HYDROCHLORIDE 0.5 MG: 1 INJECTION, SOLUTION INTRAMUSCULAR; INTRAVENOUS; SUBCUTANEOUS at 08:08

## 2024-01-15 RX ADMIN — LORAZEPAM 0.5 MG: 2 INJECTION, SOLUTION INTRAMUSCULAR; INTRAVENOUS at 18:03

## 2024-01-15 RX ADMIN — ESCITALOPRAM OXALATE 20 MG: 10 TABLET, FILM COATED ORAL at 09:00

## 2024-01-15 ASSESSMENT — PAIN DESCRIPTION - LOCATION
LOCATION: HEAD
LOCATION: HEAD
LOCATION: GENERALIZED

## 2024-01-15 ASSESSMENT — PAIN - FUNCTIONAL ASSESSMENT
PAIN_FUNCTIONAL_ASSESSMENT: INTOLERABLE, UNABLE TO DO ANY ACTIVE OR PASSIVE ACTIVITIES
PAIN_FUNCTIONAL_ASSESSMENT: PREVENTS OR INTERFERES SOME ACTIVE ACTIVITIES AND ADLS
PAIN_FUNCTIONAL_ASSESSMENT: PREVENTS OR INTERFERES SOME ACTIVE ACTIVITIES AND ADLS
PAIN_FUNCTIONAL_ASSESSMENT: ACTIVITIES ARE NOT PREVENTED

## 2024-01-15 ASSESSMENT — PAIN DESCRIPTION - ORIENTATION
ORIENTATION: ANTERIOR;POSTERIOR
ORIENTATION: ANTERIOR;POSTERIOR

## 2024-01-15 ASSESSMENT — PAIN DESCRIPTION - DESCRIPTORS
DESCRIPTORS: ACHING
DESCRIPTORS: ACHING
DESCRIPTORS: ACHING;DISCOMFORT;THROBBING
DESCRIPTORS: NAGGING
DESCRIPTORS: ACHING
DESCRIPTORS: ACHING
DESCRIPTORS: ACHING;THROBBING

## 2024-01-15 ASSESSMENT — PAIN SCALES - GENERAL
PAINLEVEL_OUTOF10: 8
PAINLEVEL_OUTOF10: 0
PAINLEVEL_OUTOF10: 10
PAINLEVEL_OUTOF10: 0
PAINLEVEL_OUTOF10: 0
PAINLEVEL_OUTOF10: 6
PAINLEVEL_OUTOF10: 8
PAINLEVEL_OUTOF10: 0
PAINLEVEL_OUTOF10: 8
PAINLEVEL_OUTOF10: 8

## 2024-01-15 ASSESSMENT — PAIN SCALES - WONG BAKER: WONGBAKER_NUMERICALRESPONSE: 0

## 2024-01-15 NOTE — CARE COORDINATION
Noted [facility] referral by constance M[facility]. Called [facility] this morning for follow up. LVM.     [facility] to [facility] up [facility] as [facility].     [facility] met with family. Plan to discharge home and son will move in with father, and [facility] for [facility] and [facility] at home.     UPDATE: Spoke to son, explained that we cannot hold discharge awaiting on [facility] with the [facility] as that can take weeks.   Per son, [facility] plans to have equipment for patient delivered at the latest tomorrow morning.   Explained we would plan to discharge once equipment was in the home.   Patient will need transportation as well. Has [employer], that should be able to billed for transport home when the times comes.

## 2024-01-16 VITALS
OXYGEN SATURATION: 98 % | BODY MASS INDEX: 20.88 KG/M2 | HEART RATE: 108 BPM | WEIGHT: 141 LBS | HEIGHT: 69 IN | TEMPERATURE: 97.5 F | SYSTOLIC BLOOD PRESSURE: 132 MMHG | RESPIRATION RATE: 12 BRPM | DIASTOLIC BLOOD PRESSURE: 73 MMHG

## 2024-01-16 PROCEDURE — 2580000003 HC RX 258: Performed by: INTERNAL MEDICINE

## 2024-01-16 PROCEDURE — 6370000000 HC RX 637 (ALT 250 FOR IP): Performed by: STUDENT IN AN ORGANIZED HEALTH CARE EDUCATION/TRAINING PROGRAM

## 2024-01-16 PROCEDURE — 94640 AIRWAY INHALATION TREATMENT: CPT

## 2024-01-16 PROCEDURE — 6360000002 HC RX W HCPCS: Performed by: PHYSICIAN ASSISTANT

## 2024-01-16 PROCEDURE — 6370000000 HC RX 637 (ALT 250 FOR IP): Performed by: FAMILY MEDICINE

## 2024-01-16 PROCEDURE — 6370000000 HC RX 637 (ALT 250 FOR IP): Performed by: INTERNAL MEDICINE

## 2024-01-16 PROCEDURE — 6360000002 HC RX W HCPCS

## 2024-01-16 PROCEDURE — 6360000002 HC RX W HCPCS: Performed by: INTERNAL MEDICINE

## 2024-01-16 PROCEDURE — 6360000002 HC RX W HCPCS: Performed by: STUDENT IN AN ORGANIZED HEALTH CARE EDUCATION/TRAINING PROGRAM

## 2024-01-16 PROCEDURE — 99232 SBSQ HOSP IP/OBS MODERATE 35: CPT | Performed by: INTERNAL MEDICINE

## 2024-01-16 PROCEDURE — 51702 INSERT TEMP BLADDER CATH: CPT

## 2024-01-16 RX ORDER — MORPHINE SULFATE 4 MG/ML
4 INJECTION, SOLUTION INTRAMUSCULAR; INTRAVENOUS
Status: DISCONTINUED | OUTPATIENT
Start: 2024-01-16 | End: 2024-01-16 | Stop reason: HOSPADM

## 2024-01-16 RX ORDER — LORAZEPAM 2 MG/ML
1 INJECTION INTRAMUSCULAR EVERY 6 HOURS PRN
Status: DISCONTINUED | OUTPATIENT
Start: 2024-01-16 | End: 2024-01-16 | Stop reason: HOSPADM

## 2024-01-16 RX ORDER — MORPHINE SULFATE 2 MG/ML
1 INJECTION, SOLUTION INTRAMUSCULAR; INTRAVENOUS EVERY 6 HOURS
Status: DISCONTINUED | OUTPATIENT
Start: 2024-01-16 | End: 2024-01-16 | Stop reason: HOSPADM

## 2024-01-16 RX ORDER — MORPHINE SULFATE 2 MG/ML
2 INJECTION, SOLUTION INTRAMUSCULAR; INTRAVENOUS
Status: DISCONTINUED | OUTPATIENT
Start: 2024-01-16 | End: 2024-01-16 | Stop reason: HOSPADM

## 2024-01-16 RX ORDER — LORAZEPAM 2 MG/ML
1 INJECTION INTRAMUSCULAR ONCE
Status: COMPLETED | OUTPATIENT
Start: 2024-01-16 | End: 2024-01-16

## 2024-01-16 RX ADMIN — SODIUM CHLORIDE, PRESERVATIVE FREE 10 ML: 5 INJECTION INTRAVENOUS at 06:48

## 2024-01-16 RX ADMIN — METHYLPREDNISOLONE SODIUM SUCCINATE 20 MG: 40 INJECTION, POWDER, FOR SOLUTION INTRAMUSCULAR; INTRAVENOUS at 08:50

## 2024-01-16 RX ADMIN — METOCLOPRAMIDE HYDROCHLORIDE 10 MG: 5 INJECTION INTRAMUSCULAR; INTRAVENOUS at 06:06

## 2024-01-16 RX ADMIN — SODIUM CHLORIDE, PRESERVATIVE FREE 10 ML: 5 INJECTION INTRAVENOUS at 08:50

## 2024-01-16 RX ADMIN — SODIUM CHLORIDE, PRESERVATIVE FREE 10 ML: 5 INJECTION INTRAVENOUS at 03:47

## 2024-01-16 RX ADMIN — LANSOPRAZOLE 30 MG: 30 TABLET, ORALLY DISINTEGRATING ORAL at 06:06

## 2024-01-16 RX ADMIN — SODIUM CHLORIDE, PRESERVATIVE FREE 10 ML: 5 INJECTION INTRAVENOUS at 06:08

## 2024-01-16 RX ADMIN — ESCITALOPRAM OXALATE 20 MG: 10 TABLET, FILM COATED ORAL at 08:50

## 2024-01-16 RX ADMIN — HYDROMORPHONE HYDROCHLORIDE 0.5 MG: 1 INJECTION, SOLUTION INTRAMUSCULAR; INTRAVENOUS; SUBCUTANEOUS at 09:53

## 2024-01-16 RX ADMIN — IPRATROPIUM BROMIDE AND ALBUTEROL SULFATE 1 DOSE: 2.5; .5 SOLUTION RESPIRATORY (INHALATION) at 08:37

## 2024-01-16 RX ADMIN — ATORVASTATIN CALCIUM 40 MG: 40 TABLET, FILM COATED ORAL at 08:50

## 2024-01-16 RX ADMIN — SODIUM CHLORIDE, PRESERVATIVE FREE 10 ML: 5 INJECTION INTRAVENOUS at 00:37

## 2024-01-16 RX ADMIN — LORAZEPAM 1 MG: 1 TABLET ORAL at 08:50

## 2024-01-16 RX ADMIN — BUSPIRONE HYDROCHLORIDE 30 MG: 15 TABLET ORAL at 08:50

## 2024-01-16 RX ADMIN — HYDROMORPHONE HYDROCHLORIDE 0.5 MG: 1 INJECTION, SOLUTION INTRAMUSCULAR; INTRAVENOUS; SUBCUTANEOUS at 03:46

## 2024-01-16 RX ADMIN — LORAZEPAM 1 MG: 2 INJECTION, SOLUTION INTRAMUSCULAR; INTRAVENOUS at 00:36

## 2024-01-16 RX ADMIN — HYDROMORPHONE HYDROCHLORIDE 0.5 MG: 1 INJECTION, SOLUTION INTRAMUSCULAR; INTRAVENOUS; SUBCUTANEOUS at 06:47

## 2024-01-16 RX ADMIN — HYDROXYZINE HYDROCHLORIDE 10 MG: 10 TABLET ORAL at 06:06

## 2024-01-16 RX ADMIN — MORPHINE SULFATE 1 MG: 2 INJECTION, SOLUTION INTRAMUSCULAR; INTRAVENOUS at 11:50

## 2024-01-16 RX ADMIN — METOPROLOL TARTRATE 75 MG: 50 TABLET, FILM COATED ORAL at 08:50

## 2024-01-16 RX ADMIN — BUDESONIDE AND FORMOTEROL FUMARATE DIHYDRATE 2 PUFF: 160; 4.5 AEROSOL RESPIRATORY (INHALATION) at 08:38

## 2024-01-16 ASSESSMENT — PAIN DESCRIPTION - LOCATION
LOCATION: HEAD;OTHER (COMMENT)
LOCATION: HEAD
LOCATION: HEAD;NECK
LOCATION: GENERALIZED

## 2024-01-16 ASSESSMENT — PAIN DESCRIPTION - PAIN TYPE: TYPE: CHRONIC PAIN

## 2024-01-16 ASSESSMENT — PAIN DESCRIPTION - DESCRIPTORS
DESCRIPTORS: THROBBING;DISCOMFORT;PRESSURE
DESCRIPTORS: ACHING;SQUEEZING
DESCRIPTORS: ACHING
DESCRIPTORS: ACHING;THROBBING

## 2024-01-16 ASSESSMENT — PAIN DESCRIPTION - ONSET: ONSET: ON-GOING

## 2024-01-16 ASSESSMENT — PAIN DESCRIPTION - DIRECTION: RADIATING_TOWARDS: DOWN BACK OF NECK

## 2024-01-16 ASSESSMENT — PAIN DESCRIPTION - ORIENTATION
ORIENTATION: MID;LOWER;UPPER
ORIENTATION: ANTERIOR;POSTERIOR;MID
ORIENTATION: ANTERIOR;POSTERIOR
ORIENTATION: ANTERIOR;POSTERIOR

## 2024-01-16 ASSESSMENT — PAIN SCALES - GENERAL
PAINLEVEL_OUTOF10: 0
PAINLEVEL_OUTOF10: 7
PAINLEVEL_OUTOF10: 10

## 2024-01-16 ASSESSMENT — PAIN DESCRIPTION - FREQUENCY: FREQUENCY: CONTINUOUS

## 2024-01-16 NOTE — CARE COORDINATION
Charge nurse informed CM that pt is declining and now needs [facility].  This CM notified [facility].

## 2024-01-16 NOTE — PLAN OF CARE
Problem: Discharge Planning  Goal: Discharge to home or other facility with appropriate resources  1/11/2024 0845 by Demetrice Hwang RN  Outcome: Progressing  1/11/2024 0316 by Angela Jean Baptiste RN  Outcome: Progressing  Flowsheets (Taken 1/11/2024 0316)  Discharge to home or other facility with appropriate resources:   Identify barriers to discharge with patient and caregiver   Arrange for needed discharge resources and transportation as appropriate   Identify discharge learning needs (meds, wound care, etc)   Arrange for interpreters to assist at discharge as needed     Problem: Safety - Adult  Goal: Free from fall injury  1/11/2024 0845 by Demetrice Hwang RN  Outcome: Progressing  1/11/2024 0316 by Angela Jean Baptiste RN  Outcome: Progressing  Flowsheets (Taken 1/11/2024 0316)  Free From Fall Injury: Instruct family/caregiver on patient safety  Note: Bed alarm activated     Problem: Chronic Conditions and Co-morbidities  Goal: Patient's chronic conditions and co-morbidity symptoms are monitored and maintained or improved  1/11/2024 0845 by Demetrice Hwang RN  Outcome: Progressing  1/11/2024 0316 by Angela Jean Baptiste RN  Outcome: Progressing  Flowsheets (Taken 1/11/2024 0316)  Care Plan - Patient's Chronic Conditions and Co-Morbidity Symptoms are Monitored and Maintained or Improved:   Monitor and assess patient's chronic conditions and comorbid symptoms for stability, deterioration, or improvement   Collaborate with multidisciplinary team to address chronic and comorbid conditions and prevent exacerbation or deterioration   Update acute care plan with appropriate goals if chronic or comorbid symptoms are exacerbated and prevent overall improvement and discharge     Problem: Nutrition Deficit:  Goal: Optimize nutritional status  1/11/2024 0845 by Demetrice Hwang RN  Outcome: Progressing  1/11/2024 0316 by Angela Jean Baptiste RN  Outcome: Progressing  Flowsheets (Taken 1/11/2024 0316)  Nutrient intake 
  Problem: Discharge Planning  Goal: Discharge to home or other facility with appropriate resources  1/14/2024 2151 by Barbra Juárez RN  Outcome: Progressing  1/14/2024 0757 by Demetrice Hwang RN  Outcome: Progressing     Problem: Safety - Adult  Goal: Free from fall injury  1/14/2024 2151 by Barbra Juárez RN  Outcome: Progressing  1/14/2024 0757 by Demetrice Hwang RN  Outcome: Progressing     Problem: Chronic Conditions and Co-morbidities  Goal: Patient's chronic conditions and co-morbidity symptoms are monitored and maintained or improved  1/14/2024 2151 by Barbra Juárez RN  Outcome: Progressing  1/14/2024 0757 by Demetrice Hwang RN  Outcome: Progressing     Problem: Nutrition Deficit:  Goal: Optimize nutritional status  1/14/2024 2151 by Barbra Juárez RN  Outcome: Progressing  1/14/2024 0757 by Demetrice Hwang RN  Outcome: Progressing     Problem: Pain  Goal: Verbalizes/displays adequate comfort level or baseline comfort level  1/14/2024 2151 by Barbra Juárez RN  Outcome: Progressing  1/14/2024 0757 by Demetrice Hwang RN  Outcome: Progressing     Problem: ABCDS Injury Assessment  Goal: Absence of physical injury  1/14/2024 2151 by Barbra Juárez RN  Outcome: Progressing  1/14/2024 0757 by Demetrice Hwang RN  Outcome: Progressing     Problem: Skin/Tissue Integrity  Goal: Absence of new skin breakdown  Description: 1.  Monitor for areas of redness and/or skin breakdown  2.  Assess vascular access sites hourly  3.  Every 4-6 hours minimum:  Change oxygen saturation probe site  4.  Every 4-6 hours:  If on nasal continuous positive airway pressure, respiratory therapy assess nares and determine need for appliance change or resting period.  1/14/2024 2151 by Barbra Juárez RN  Outcome: Progressing  1/14/2024 0757 by Demetrice Hwang RN  Outcome: Progressing     Problem: Respiratory - Adult  Goal: Achieves optimal ventilation and oxygenation  1/14/2024 
  Problem: Discharge Planning  Goal: Discharge to home or other facility with appropriate resources  1/6/2024 1037 by Nataliia Wiley RN  Outcome: Progressing  1/5/2024 2304 by Ramya Connor RN  Outcome: Progressing     Problem: Safety - Adult  Goal: Free from fall injury  1/6/2024 1037 by Nataliia Wiley RN  Outcome: Progressing  1/5/2024 2304 by Ramya Connor RN  Outcome: Progressing     Problem: Chronic Conditions and Co-morbidities  Goal: Patient's chronic conditions and co-morbidity symptoms are monitored and maintained or improved  1/6/2024 1037 by Nataliia Wiley RN  Outcome: Progressing  1/5/2024 2304 by Ramya Connor RN  Outcome: Progressing     Problem: Nutrition Deficit:  Goal: Optimize nutritional status  1/6/2024 1037 by Nataliia Wiley RN  Outcome: Progressing  1/5/2024 2304 by Ramya Connor RN  Outcome: Progressing     Problem: Pain  Goal: Verbalizes/displays adequate comfort level or baseline comfort level  Outcome: Progressing     
  Problem: Discharge Planning  Goal: Discharge to home or other facility with appropriate resources  1/7/2024 1107 by Nataliia Wiley RN  Outcome: Progressing  1/7/2024 0502 by Ramya Connor RN  Outcome: Progressing     Problem: Safety - Adult  Goal: Free from fall injury  1/7/2024 1107 by Nataliia Wiley RN  Outcome: Progressing  1/7/2024 0502 by Ramya Connor RN  Outcome: Progressing     Problem: Chronic Conditions and Co-morbidities  Goal: Patient's chronic conditions and co-morbidity symptoms are monitored and maintained or improved  1/7/2024 1107 by Nataliia Wiley RN  Outcome: Progressing  1/7/2024 0502 by Ramya Connor RN  Outcome: Progressing     Problem: Nutrition Deficit:  Goal: Optimize nutritional status  1/7/2024 1107 by Nataliia Wiley RN  Outcome: Progressing  1/7/2024 0502 by Ramya Connor RN  Outcome: Progressing     Problem: Pain  Goal: Verbalizes/displays adequate comfort level or baseline comfort level  1/7/2024 1107 by Nataliia Wiley RN  Outcome: Progressing  1/7/2024 0502 by Ramya Connor RN  Outcome: Progressing     Problem: ABCDS Injury Assessment  Goal: Absence of physical injury  1/7/2024 1107 by Nataliia Wiley RN  Outcome: Progressing  1/7/2024 0502 by Ramya Connor RN  Outcome: Progressing     
  Problem: Discharge Planning  Goal: Discharge to home or other facility with appropriate resources  Outcome: Progressing     Problem: Safety - Adult  Goal: Free from fall injury  Outcome: Progressing     Problem: Chronic Conditions and Co-morbidities  Goal: Patient's chronic conditions and co-morbidity symptoms are monitored and maintained or improved  Outcome: Progressing     Problem: Nutrition Deficit:  Goal: Optimize nutritional status  Outcome: Progressing     
  Problem: Discharge Planning  Goal: Discharge to home or other facility with appropriate resources  Outcome: Progressing     Problem: Safety - Adult  Goal: Free from fall injury  Outcome: Progressing     Problem: Chronic Conditions and Co-morbidities  Goal: Patient's chronic conditions and co-morbidity symptoms are monitored and maintained or improved  Outcome: Progressing     Problem: Nutrition Deficit:  Goal: Optimize nutritional status  Outcome: Progressing     Problem: Pain  Goal: Verbalizes/displays adequate comfort level or baseline comfort level  Outcome: Progressing     Problem: ABCDS Injury Assessment  Goal: Absence of physical injury  Outcome: Progressing     
  Problem: Discharge Planning  Goal: Discharge to home or other facility with appropriate resources  Outcome: Progressing     Problem: Safety - Adult  Goal: Free from fall injury  Outcome: Progressing     Problem: Chronic Conditions and Co-morbidities  Goal: Patient's chronic conditions and co-morbidity symptoms are monitored and maintained or improved  Outcome: Progressing     Problem: Nutrition Deficit:  Goal: Optimize nutritional status  Outcome: Progressing     Problem: Pain  Goal: Verbalizes/displays adequate comfort level or baseline comfort level  Outcome: Progressing     Problem: ABCDS Injury Assessment  Goal: Absence of physical injury  Outcome: Progressing     Problem: Skin/Tissue Integrity  Goal: Absence of new skin breakdown  Description: 1.  Monitor for areas of redness and/or skin breakdown  2.  Assess vascular access sites hourly  3.  Every 4-6 hours minimum:  Change oxygen saturation probe site  4.  Every 4-6 hours:  If on nasal continuous positive airway pressure, respiratory therapy assess nares and determine need for appliance change or resting period.  Outcome: Progressing     Problem: Respiratory - Adult  Goal: Achieves optimal ventilation and oxygenation  Outcome: Progressing     Problem: Gastrointestinal - Adult  Goal: Minimal or absence of nausea and vomiting  Outcome: Progressing  Goal: Maintains adequate nutritional intake  Outcome: Progressing     
  Problem: Discharge Planning  Goal: Discharge to home or other facility with appropriate resources  Outcome: Progressing  Flowsheets (Taken 1/11/2024 0316)  Discharge to home or other facility with appropriate resources:   Identify barriers to discharge with patient and caregiver   Arrange for needed discharge resources and transportation as appropriate   Identify discharge learning needs (meds, wound care, etc)   Arrange for interpreters to assist at discharge as needed     Problem: Safety - Adult  Goal: Free from fall injury  Outcome: Progressing  Flowsheets (Taken 1/11/2024 0316)  Free From Fall Injury: Instruct family/caregiver on patient safety  Note: Bed alarm activated     Problem: Chronic Conditions and Co-morbidities  Goal: Patient's chronic conditions and co-morbidity symptoms are monitored and maintained or improved  Outcome: Progressing  Flowsheets (Taken 1/11/2024 0316)  Care Plan - Patient's Chronic Conditions and Co-Morbidity Symptoms are Monitored and Maintained or Improved:   Monitor and assess patient's chronic conditions and comorbid symptoms for stability, deterioration, or improvement   Collaborate with multidisciplinary team to address chronic and comorbid conditions and prevent exacerbation or deterioration   Update acute care plan with appropriate goals if chronic or comorbid symptoms are exacerbated and prevent overall improvement and discharge     Problem: Nutrition Deficit:  Goal: Optimize nutritional status  Outcome: Progressing  Flowsheets (Taken 1/11/2024 0316)  Nutrient intake appropriate for improving, restoring, or maintaining nutritional needs:   Assess nutritional status and recommend course of action   Monitor oral intake, labs, and treatment plans   Recommend appropriate diets, oral nutritional supplements, and vitamin/mineral supplements   Order, calculate, and assess calorie counts as needed  Note: PEG w/ con't TF, Scheduled Reglan      Problem: Pain  Goal: Verbalizes/displays 
  Problem: Discharge Planning  Goal: Discharge to home or other facility with appropriate resources  Outcome: Progressing  Flowsheets (Taken 1/12/2024 0256)  Discharge to home or other facility with appropriate resources:   Identify barriers to discharge with patient and caregiver   Arrange for needed discharge resources and transportation as appropriate   Identify discharge learning needs (meds, wound care, etc)   Refer to discharge planning if patient needs post-hospital services based on physician order or complex needs related to functional status, cognitive ability or social support system     Problem: Safety - Adult  Goal: Free from fall injury  Outcome: Progressing  Flowsheets (Taken 1/12/2024 0256)  Free From Fall Injury:   Instruct family/caregiver on patient safety   Based on caregiver fall risk screen, instruct family/caregiver to ask for assistance with transferring infant if caregiver noted to have fall risk factors     Problem: Chronic Conditions and Co-morbidities  Goal: Patient's chronic conditions and co-morbidity symptoms are monitored and maintained or improved  Outcome: Progressing  Flowsheets (Taken 1/12/2024 0256)  Care Plan - Patient's Chronic Conditions and Co-Morbidity Symptoms are Monitored and Maintained or Improved:   Monitor and assess patient's chronic conditions and comorbid symptoms for stability, deterioration, or improvement   Collaborate with multidisciplinary team to address chronic and comorbid conditions and prevent exacerbation or deterioration   Update acute care plan with appropriate goals if chronic or comorbid symptoms are exacerbated and prevent overall improvement and discharge     Problem: Pain  Goal: Verbalizes/displays adequate comfort level or baseline comfort level  Outcome: Progressing  Flowsheets (Taken 1/12/2024 0256)  Verbalizes/displays adequate comfort level or baseline comfort level:   Encourage patient to monitor pain and request assistance   Assess pain 
  Problem: Discharge Planning  Goal: Discharge to home or other facility with appropriate resources  Outcome: Progressing  Flowsheets (Taken 1/16/2024 0848)  Discharge to home or other facility with appropriate resources:   Identify barriers to discharge with patient and caregiver   Arrange for needed discharge resources and transportation as appropriate   Identify discharge learning needs (meds, wound care, etc)   Arrange for interpreters to assist at discharge as needed   Refer to discharge planning if patient needs post-hospital services based on physician order or complex needs related to functional status, cognitive ability or social support system     Problem: Safety - Adult  Goal: Free from fall injury  Outcome: Progressing     Problem: Chronic Conditions and Co-morbidities  Goal: Patient's chronic conditions and co-morbidity symptoms are monitored and maintained or improved  Outcome: Progressing  Flowsheets (Taken 1/16/2024 0848)  Care Plan - Patient's Chronic Conditions and Co-Morbidity Symptoms are Monitored and Maintained or Improved:   Monitor and assess patient's chronic conditions and comorbid symptoms for stability, deterioration, or improvement   Collaborate with multidisciplinary team to address chronic and comorbid conditions and prevent exacerbation or deterioration   Update acute care plan with appropriate goals if chronic or comorbid symptoms are exacerbated and prevent overall improvement and discharge     Problem: Nutrition Deficit:  Goal: Optimize nutritional status  Outcome: Progressing     Problem: Pain  Goal: Verbalizes/displays adequate comfort level or baseline comfort level  Outcome: Progressing     Problem: ABCDS Injury Assessment  Goal: Absence of physical injury  Outcome: Progressing     Problem: Skin/Tissue Integrity  Goal: Absence of new skin breakdown  Description: 1.  Monitor for areas of redness and/or skin breakdown  2.  Assess vascular access sites hourly  3.  Every 4-6 hours 
CT ON HOLD  RN WILL CALL  
diets, oral nutritional supplements, and vitamin/mineral supplements  Note: Tube feedings restarted     Problem: Pain  Goal: Verbalizes/displays adequate comfort level or baseline comfort level  Outcome: Progressing  Flowsheets (Taken 1/13/2024 0610)  Verbalizes/displays adequate comfort level or baseline comfort level:   Encourage patient to monitor pain and request assistance   Assess pain using appropriate pain scale   Administer analgesics based on type and severity of pain and evaluate response   Implement non-pharmacological measures as appropriate and evaluate response   Notify Licensed Independent Practitioner if interventions unsuccessful or patient reports new pain     Problem: ABCDS Injury Assessment  Goal: Absence of physical injury  Outcome: Progressing  Flowsheets (Taken 1/13/2024 0610)  Absence of Physical Injury: Implement safety measures based on patient assessment     Problem: Skin/Tissue Integrity  Goal: Absence of new skin breakdown  Description: 1.  Monitor for areas of redness and/or skin breakdown  2.  Assess vascular access sites hourly  3.  Every 4-6 hours minimum:  Change oxygen saturation probe site  4.  Every 4-6 hours:  If on nasal continuous positive airway pressure, respiratory therapy assess nares and determine need for appliance change or resting period.  Outcome: Progressing     Problem: Respiratory - Adult  Goal: Achieves optimal ventilation and oxygenation  Outcome: Progressing  Flowsheets (Taken 1/13/2024 0610)  Achieves optimal ventilation and oxygenation:   Assess for changes in respiratory status   Assess for changes in mentation and behavior   Position to facilitate oxygenation and minimize respiratory effort   Oxygen supplementation based on oxygen saturation or arterial blood gases   Respiratory therapy support as indicated   Assess and instruct to report shortness of breath or any respiratory difficulty  Note: Respiratory following. Patient remains on Heated High flow O2   
period.  1/12/2024 0918 by Demetrice Hwang RN  Outcome: Progressing  1/12/2024 0256 by Angela Jean Baptiste RN  Outcome: Progressing     Problem: Respiratory - Adult  Goal: Achieves optimal ventilation and oxygenation  1/12/2024 0918 by Demetrice Hwang RN  Outcome: Progressing  1/12/2024 0256 by Angela Jean Baptiste RN  Outcome: Progressing  Flowsheets (Taken 1/12/2024 0256)  Achieves optimal ventilation and oxygenation:   Assess for changes in mentation and behavior   Assess for changes in respiratory status   Position to facilitate oxygenation and minimize respiratory effort   Oxygen supplementation based on oxygen saturation or arterial blood gases   Assess the need for suctioning and aspirate as needed   Assess and instruct to report shortness of breath or any respiratory difficulty   Respiratory therapy support as indicated     Problem: Gastrointestinal - Adult  Goal: Minimal or absence of nausea and vomiting  1/12/2024 0918 by Demetrice Hwang RN  Outcome: Progressing  1/12/2024 0256 by Angela Jean Baptiste RN  Outcome: Progressing  Flowsheets (Taken 1/12/2024 0256)  Minimal or absence of nausea and vomiting:   Maintain NPO status until nausea and vomiting are resolved   Administer ordered antiemetic medications as needed   Nutrition consult to assist patient with adequate nutrition and appropriate food choices  Note: TF on hold d/t c/o nausea & abd pain  Goal: Maintains adequate nutritional intake  1/12/2024 0918 by Demetrice Hwang RN  Outcome: Progressing  1/12/2024 0256 by Angela Jean Baptiste RN  Outcome: Not Progressing  Flowsheets (Taken 1/12/2024 0256)  Maintains adequate nutritional intake:   Monitor intake and output, weight and lab values   Obtain nutritional consult as needed  Note: NPO for tube feedings     Problem: Nutrition Deficit:  Goal: Optimize nutritional status  1/12/2024 0918 by Demetrice Hwang RN  Outcome: Progressing  1/12/2024 0256 by Angela Jean Baptiste RN  Outcome: Not

## 2024-01-16 NOTE — DISCHARGE SUMMARY
V2.0  Discharge Summary    Name:  Mateo Street /Age/Sex: 1945 (78 y.o. male)   Admit Date: 2024  Discharge Date: 24    MRN & CSN:  [id number] & [id number] Encounter Date and Time 24 12:36 PM EST    Attending:  Bora Macdonald MD Discharging Provider: Bora Macdonald MD       Hospital Course:     Brief HPI:     Mateo Street is a 78 y.o. male with PMH of metastatic non-small cell carcinoma, COPD, atrial flutter who presents with days of SOB, fatigue and poor oral intake. Denies any URI symptoms, denies worsening of cough. Pt on home 3L NC. Pt has been struggling with swallowing pills as well. He is on liquid diet but his oral intake has decreased lately.      At ED, pt was afebrile, hemodynamically stable 's, 's; on 3-4L NC Labs Na 143, K 4.4, Cr 0.9, , troponin 56, WBC 14.7, Hgb 12.0. VBG pH 7.25, pCo2 71. CXR no acute changes. Covid-19, rapid flu all negative. Pt received breathing tx, steroids, vanc+cefepime, 2L IVF NS.     Brief Problem Based Course:     Patient was seen and examined at bedside during morning rounds.  Minimal responses to questions.  Shortly after rounds patient had increased work of breathing and became hypoxic requiring 15 L of oxygen via nasal cannula with saturation in the mid 80s.  Spoke to patient's son via phone and discussed inpatient hospice.  Son requested that he was on his way to the hospital and he would like to come to bedside before making any decision since he would prefer that patient passed at home.  Son came to bedside and requested comfort measures and request to hospice for inpatient hospice.  Comfort measures were initiated.  Patient continued to decline.  Was called to bedside to see patient due to unresponsiveness.  On exam patient did not respond to verbal or physical stimuli.  Absent heart and breath sounds.  Absent peripheral pulses.  Pupils fixed and dilated.  Patient pronounced dead at 12: 05 PM.  Condolences

## 2024-01-16 NOTE — PROGRESS NOTES
Cardiology Progress Note     Admit Date:  1/2/2024    Consult reason/ Seen today for :       Subjective and  Overnight Events :   Tachycardic heart rate low 100s even after digoxin and respiratory distress    Chief complain on admission : 78 y.o.year old who is admitted for  Chief Complaint   Patient presents with    Fatigue    Shortness of Breath      Assessment / Plan:  Atrial flutter with rapid heart rate gotten digoxin on IV metoprolol started DEBI tube continue Xarelto if cleared by surgery  Start metoprolol 25 X4 times daily  Metastatic lung cancer supportive care chemotherapy as per primary team  Abnormal troponin in setting of hypoxia respiratory failure tachycardia we will hold off on any invasive workup at this time  Respiratory failure secondary to aspiration pneumonitis as per primary team  HTN: stable, continue To titrate up medication as needed  DVT Prophylaxis if no contraindication    Past medical history:    has a past medical history of Alcohol abuse, Atrial flutter (HCC), COPD (chronic obstructive pulmonary disease) (HCC), Diabetes mellitus (HCC), H/O 24 hour EKG monitoring, H/O cardiovascular stress test, H/O echocardiogram, History of cardiac monitoring, History of cardioversion, Hx of echocardiogram, Hyperlipidemia, Hypertension, and Obesity.  Past surgical history:   has a past surgical history that includes Atrial ablation surgery (10/2006); Atrial ablation surgery (10/06/2006); Infected skin debridement (03/2012); Colonoscopy (03/03/2015); hernia repair; and CT NEEDLE BIOPSY LUNG PERCUTANEOUS (11/29/2023).  Social History:   reports that he quit smoking about 21 years ago. His smoking use included cigarettes. He started smoking about 61 years ago. He has a 20.0 pack-year smoking history. He has never used smokeless tobacco. He reports that he does not currently use alcohol. He reports that he does not use 
                                                                               Cardiology Progress Note     Admit Date:  1/2/2024    Consult reason/ Seen today for :       Subjective and  Overnight Events : Heart rate has come down significantly, Bp is high rate controlled   He is in atrial flutter with slow heart rate response      Chief complain on admission : 78 y.o.year old who is admitted for  Chief Complaint   Patient presents with    Fatigue    Shortness of Breath      Assessment / Plan:  Atrial flutter , continue Xarelto  Change metoprolol to 75 mg bid , stop cardizem 60 mg qid  Metastatic lung cancer supportive care chemotherapy as per primary team  Abnormal troponin in setting of hypoxia respiratory failure tachycardia we will hold off on any invasive workup at this time  Respiratory failure secondary to aspiration pneumonitis as per primary team  HTN: stable, continue To titrate up medication as needed  DVT Prophylaxis if no contraindication  Will sign off call with questions    Past medical history:    has a past medical history of Alcohol abuse, Atrial flutter (HCC), COPD (chronic obstructive pulmonary disease) (HCC), Diabetes mellitus (HCC), DVT of axillary vein, acute (HCC), H/O 24 hour EKG monitoring, H/O cardiovascular stress test, H/O echocardiogram, History of cardiac monitoring, History of cardioversion, Hx of echocardiogram, Hyperlipidemia, Hypertension, and Obesity.  Past surgical history:   has a past surgical history that includes Atrial ablation surgery (10/2006); Atrial ablation surgery (10/06/2006); Infected skin debridement (03/2012); Colonoscopy (03/03/2015); hernia repair; CT NEEDLE BIOPSY LUNG PERCUTANEOUS (11/29/2023); and Gastrostomy tube placement (N/A, 1/6/2024).  Social History:   reports that he quit smoking about 21 years ago. His smoking use included cigarettes. He started smoking about 61 years ago. He has a 20.0 pack-year smoking history. He has never used smokeless tobacco. 
                                                                               Cardiology Progress Note     Admit Date:  1/2/2024    Consult reason/ Seen today for :       Subjective and  Overnight Events : Hr is slightly better , HE says he is very weak , Bp is high rate controlled       Chief complain on admission : 78 y.o.year old who is admitted for  Chief Complaint   Patient presents with    Fatigue    Shortness of Breath      Assessment / Plan:  Atrial flutter with rapid heart rate gotten digoxin on IV metoprolol started DEBI tube continue Xarelto if cleared by surgery  Change metoprolol to 75 mg bid ,continue cardizem 60 mg qid  Metastatic lung cancer supportive care chemotherapy as per primary team  Abnormal troponin in setting of hypoxia respiratory failure tachycardia we will hold off on any invasive workup at this time  Respiratory failure secondary to aspiration pneumonitis as per primary team  HTN: stable, continue To titrate up medication as needed  DVT Prophylaxis if no contraindication  Will sign off call with questions    Past medical history:    has a past medical history of Alcohol abuse, Atrial flutter (HCC), COPD (chronic obstructive pulmonary disease) (HCC), Diabetes mellitus (HCC), H/O 24 hour EKG monitoring, H/O cardiovascular stress test, H/O echocardiogram, History of cardiac monitoring, History of cardioversion, Hx of echocardiogram, Hyperlipidemia, Hypertension, and Obesity.  Past surgical history:   has a past surgical history that includes Atrial ablation surgery (10/2006); Atrial ablation surgery (10/06/2006); Infected skin debridement (03/2012); Colonoscopy (03/03/2015); hernia repair; CT NEEDLE BIOPSY LUNG PERCUTANEOUS (11/29/2023); and Gastrostomy tube placement (N/A, 1/6/2024).  Social History:   reports that he quit smoking about 21 years ago. His smoking use included cigarettes. He started smoking about 61 years ago. He has a 20.0 pack-year smoking history. He has never used 
     Nutrition Note    Please continue to progress EN to goal with reglan.   W/O  ml free water flush needed Q4H, will update order and continue to follow.    Electronically signed by Amanda Rosario RD, LD on 1/9/24 at 9:02 AM EST    Contact: [phone]    
    V2.0  [facility] [place] Hospitalist Progress Note      Name:  Mateo Street /Age/Sex: 1945  (78 y.o. male)   MRN & CSN:  [id number] & [id number] Encounter Date/Time: 2024 10:24 AM EST    Location:  [address] [place] [address] PCP: Salvador Riley MD       Hospital Day: 8      Subjective:     Chief Complaint:  Fatigue and Shortness of Breath     TF was apparently only resumed early this morning. Nursing team escalating per order.  Nursing communication order in place NOT to check gastric volume residual even with advancing TF  Has increased O2 requirement 6L - saturation maintained - weaning down as able.  HR controlled.  Complained of left ear ache - examined with otoscope - no abnormalities noted.   Leukocytosis up-trending likely in the setting of steroid use. Repeat infectious workup is pending.   Echo WNL.  Precert pending for [facility].   Awaiting Tube feed to reach goal.  Overall poor prognosis.    No n/v/d.    Assessment and Plan:   Acute hypoxic hypercapnic respiratory failure  2/2 aspiration pneumonitis and suspected COPD exacerbation. Hx of COPD, and lung cancer.  pH 7.25, pCO2 71.   Was on  BIPAP down to 3L NC but up to 5L NC  Repeat CXR  atelectasis vs infiltrates no sign of worsening infection  - steroids per pulm, breathing tx  - supportive therapy  - completed cefepime  - resume COPD home meds  - pulm onboard  - wean O2 as tolerated ; goal spO2>90%     SIRS 2/4 vs sepsis 2/2 bacterial pneumonia although less likely. P/w tachycardia, leukocytosis could be 2/2 dehydration. CXR no acute changes.   Resp cx negative so far. MRSA negative.    Strep, legionella negative. Blood cxX2 - pending.  Was on IVF now stopped.  - completed  Cefepime 7 days     Dysphagia, malnutrition. 2/2 cancer  S/p PEG tube and TF 2024   Held yesterday given gastric residual.  - received Reglan per GI  - discussed with RN, no need to check TF residual as it is against current ASPEN guidelines. Nursing communication order in place to 
    V2.0  [facility] [place] Hospitalist Progress Note      Name:  Mateo Street /Age/Sex: 1945  (78 y.o. male)   MRN & CSN:  [id number] & [id number] Encounter Date/Time: 2024 10:24 AM EST    Location:  [address] [place] [address] PCP: Salvador Riley MD       Hospital Day: 13      Subjective:     Chief Complaint:  Fatigue and Shortness of Breath     Patient reports feeling much better.  Agrees with current plan of hospice at home.     Assessment and Plan:   Acute hypoxic hypercapnic respiratory failure  2/2 aspiration pneumonitis and suspected COPD exacerbation. Hx of COPD, and lung cancer.  pH 7.25, pCO2 71.   Was on  BIPAP down to 3L NC but up to 5L NC  Repeat CXR  atelectasis vs infiltrates no sign of worsening infection  - steroids per pulm, breathing tx  - supportive therapy  - completed cefepime  - resume COPD home meds  - pulm onboard  - NC at 5-6L/min  - wean O2 as tolerated ; goal spO2>90%     SIRS 2/4 vs sepsis 2/2 bacterial pneumonia although less likely. P/w tachycardia, leukocytosis could be 2/2 dehydration. CXR no acute changes.   Resp cx negative so far. MRSA negative.    Strep, legionella negative. Blood cxX2 - pending.  Was on IVF now stopped.  - completed  Cefepime 7 days     Candidemia vs contamination  -repeat culture done  -ID onboard  -started on fluconazole - discontinued after repeat negative    Dysphagia, malnutrition. 2/2 cancer  S/p PEG tube and TF 2024   Held yesterday given gastric residual.  - received Reglan per GI  - discussed with RN, no need to check TF residual as it is against current ASPEN guidelines. Nursing communication order in place to advance TF and NOT to check gastric residual volume    Atrial flutter with variable block. On home diltiazem and Xarelto.  Was having episodes reached 160's before receiving digoxin 500 mcg.   Had episode today 120's but now down to 80's NSR.  - holding both metoprolol and cardizem in the setting of slow conduction  - resumed Xarelto   - cardiology 
    V2.0  [facility] [place] Hospitalist Progress Note      Name:  Mateo Street /Age/Sex: 1945  (78 y.o. male)   MRN & CSN:  [id number] & [id number] Encounter Date/Time: 2024 10:24 AM EST    Location:  [address] [place], [place] [address] PCP: Salvador Riley MD       Hospital Day: 4      Subjective:     Chief Complaint:  Fatigue and Shortness of Breath     Patient seen and examined at bedside. Still feels SOB, cough are the same.   Very minimal oral intake.   No n/v/d.    Assessment and Plan:   Acute hypoxic hypercapnic respiratory failure  2/2 aspiration pneumonitis and suspected COPD exacerbation. Hx of COPD, and lung cancer.  pH 7.25, pCO2 71.   Was on  BIPAP now down to 3L NC  - steroids per pulm, breathing tx  - supportive therapy  - abx as below  - resume COPD home meds  - pulm consulted     SIRS 2/4 vs sepsis 2/2 bacterial pneumonia although less likely. P/w tachycardia, leukocytosis could be 2/2 dehydration. CXR no acute changes.   Resp cx negative so far. MRSA negative.   - will continue IVF but slow down rate  - continue Cefepime  - f/u  strep, legionella  - f/u blood cxX2     Atrial flutter. On home diltiazem and Xarelto.  Still sinus tachycardia 120's.    hold Xarelto and start lovenox as pt not tolerating PO  - resumed dilt but pt not tolerating PO meds, changed to lopressor will increase dose today to 7.5 mg q6h  - cardiology consulted on admission; appreciate input     Metastatic lung cancer to brain w enlarging mediastinal adenopathy, R paratracheal/hilar mass measuring 6.5 x 6.2 cm with possible invasion of tracheal lesion   Is on palliative radiation.  On chemo therapy. Follows Dr. Sprague.   SLP consulted,  pt not tolerating any type of diet.   GI consulted given concern for worsening dysphagia and pt may need alternative feeding route  Hem/onc consulted  Plan for PEG tube tomorrow.     Elevated troponin. 54-> 58. Likely demand from dehydration and possible sepsis. Cardiology consulted by ED.  - monitor on tele   
    V2.0  [facility] [place] Hospitalist Progress Note      Name:  Mateo Street /Age/Sex: 1945  (78 y.o. male)   MRN & CSN:  [id number] & [id number] Encounter Date/Time: 2024 10:24 AM EST    Location:  [facility] PCP: Salvador Riley MD       Hospital Day: 7      Subjective:     Chief Complaint:  Fatigue and Shortness of Breath     TF resumed then held again as resiudual was checked. GI recommended resuming TF.   Nursing communication order in place NOT to check gastric volume residual even with advancing TF  Had increased O2 requirement 5L but today was sating 100%; discussed with RN to try weaning him down.   Still feels SOB, cough are the same.   HR better controlled 's but aflutter episode earlier 120-130's    No n/v/d.    Assessment and Plan:   Acute hypoxic hypercapnic respiratory failure  2/2 aspiration pneumonitis and suspected COPD exacerbation. Hx of COPD, and lung cancer.  pH 7.25, pCO2 71.   Was on  BIPAP down to 3L NC but up to 5L NC  Repeat CXR  atelectasis vs infiltrates no sign of worsening infection  - steroids per pulm, breathing tx  - supportive therapy  - abx as below  - resume COPD home meds  - pulm consulted  - wean O2 as tolerated ; goal spO2>90%     SIRS 2/4 vs sepsis 2/2 bacterial pneumonia although less likely. P/w tachycardia, leukocytosis could be 2/2 dehydration. CXR no acute changes.   Resp cx negative so far. MRSA negative.    Strep, legionella negative. Blood cxX2   Was on IVF now stopped.  - completed  Cefepime 7 days     Dysphagia, malnutrition. 2/2 cancer  S/p PEG tube and TF 2024   Held yesterday given gastric residual.  - received Reglan per GI  - discussed with RN, no need to check TF residual as it is against current ASPEN guidelines. Nursing communication order in place to advance TF and NOT to check gastric residual volume    Atrial flutter. On home diltiazem and Xarelto.  Was having episodes reached 160's before receiving digoxin 500 mcg.   Had episode today 
    V2.0  [facility] [place] Hospitalist Progress Note      Name:  Mateo Street /Age/Sex: 1945  (78 y.o. male)   MRN & CSN:  [id number] & [id number] Encounter Date/Time: 2024 10:24 AM EST    Location:  [address] [place] [address] PCP: Salvador Riley MD       Hospital Day: 5      Subjective:     Chief Complaint:  Fatigue and Shortness of Breath     Pt had PEG tube placed today.   Patient seen and examined at bedside. Still feels SOB, cough are the same.   HR better controlled 100's. Received digoxin 500 mcg.   No n/v/d.    Assessment and Plan:   Acute hypoxic hypercapnic respiratory failure  2/2 aspiration pneumonitis and suspected COPD exacerbation. Hx of COPD, and lung cancer.  pH 7.25, pCO2 71.   Was on  BIPAP now down to 3L NC  - steroids per pulm, breathing tx  - supportive therapy  - abx as below  - resume COPD home meds  - pulm consulted     SIRS 2/4 vs sepsis 2/2 bacterial pneumonia although less likely. P/w tachycardia, leukocytosis could be 2/2 dehydration. CXR no acute changes.   Resp cx negative so far. MRSA negative.    Strep, legionella negative. Blood cxX2   - plan to stop IVF once TF started  - continue Cefepime ; will do 7 days total     Atrial flutter. On home diltiazem and Xarelto.  Sinus tachycardia reached 160's this morning, received digoxin 500 mcg.   - resumed dilt now that pt has PEG tube; will end metoprolol IV today  - resume Xarelto -   - cardiology consulted on admission; appreciate input     Metastatic lung cancer to brain w enlarging mediastinal adenopathy, R paratracheal/hilar mass measuring 6.5 x 6.2 cm with possible invasion of tracheal lesion   Is on palliative radiation.  On chemo therapy. Follows Dr. Sprague.   SLP consulted,  pt not tolerating any type of diet.   GI consulted given concern for worsening dysphagia and pt may need alternative feeding route  Hem/onc consulted  S/p PEG tube today.     Elevated troponin. 54-> 58. Likely demand from dehydration and possible sepsis. Cardiology 
    V2.0  [facility] [place]      Name:  Mateo Street /Age/Sex: 1945  (78 y.o. male)   MRN & CSN:  [id number] & [id number] Encounter Date/Time: 2024 10:24 AM EST    Location:  [address] [place] [address] PCP: Salvador Riley MD       Hospital Day: 11      Subjective:     Chief Complaint:  Fatigue and Shortness of Breath     TF was apparently only resumed early this morning. Nursing team escalating per order.  Nursing communication order in place NOT to check gastric volume residual even with advancing TF  Has increased O2 requirement 6L - saturation maintained - weaning down as able.  HR controlled.  Complained of left ear ache - examined with otoscope - no abnormalities noted.   Leukocytosis up-trending likely in the setting of steroid use. Repeat infectious workup showed candidemia. Started on fluconazole.   Echo WNL.  Precert for [facility] received on 1/10/2024 - will be good for at least a week.  Tube feed at goal.    Yuriy in  blood cultures from 2024. Repeat culture pending. ID onboard. Treatment started. To discontinue if concern for contamination as repeat culture was sent prior to initiation of antifungal.    Atrial flutter with variable block  Respiratory distress.    Held tube feed yesterday due to pain and discomfort - restart today.  Obtained abdominal xray - stable.     Discussed with family regarding code status - changed to limited code. Family want to discuss hospice. Consult placed.    Overall poor prognosis.    No n/v/d.    Assessment and Plan:   Acute hypoxic hypercapnic respiratory failure  2/2 aspiration pneumonitis and suspected COPD exacerbation. Hx of COPD, and lung cancer.  pH 7.25, pCO2 71.   Was on  BIPAP down to 3L NC but up to 5L NC  Repeat CXR  atelectasis vs infiltrates no sign of worsening infection  - steroids per pulm, breathing tx  - supportive therapy  - completed cefepime  - resume COPD home meds  - pulm onboard  - HFNC  - wean O2 as tolerated ; goal 
    V2.0  [facility] [place] Hospitalist Progress Note      Name:  Mateo Street /Age/Sex: 1945  (78 y.o. male)   MRN & CSN:  [id number] & [id number] Encounter Date/Time: 2024 10:45 AM EST    Location:  [address] [place], [place] [address] PCP: Salvador Riley MD       Hospital Day: 3      Subjective:     Chief Complaint:  Fatigue and Shortness of Breath     Patient seen and examined at bedside. Feels better today. Still unable to tolerate any diet. NG tube attempted but no luck No n/v/d.  Still complains of headache.     Assessment and Plan:   Acute hypoxic hypercapnic respiratory failure  2/2 aspiration pneumonitis and suspected COPD exacerbation. Hx of COPD, and lung cancer.  pH 7.25, pCO2 71.   Was on  BIPAP now down to 3L NC  - steroids, breathing tx  - supportive therapy  - abx as below  - resume COPD home meds  - pulm consulted    SIRS 2/4 vs sepsis 2/2 bacterial pneumonia although less likely. P/w tachycardia, leukocytosis could be 2/2 dehydration. CXR no acute changes.   Resp cx negative so far. MRSA negative.   - will continue IVF but slow down rate  - continue Cefepime  - f/u  strep, legionella  - f/u blood cxX2     Atrial flutter. On home diltiazem and Xarelto.  Still sinus tachycardia 120's.    hold Xarelto and start lovenox as pt not tolerating PO  - resumed dilt but pt not tolerating PO meds, changed to lopressor 5mg q6h.      Metastatic lung cancer to brain w enlarging mediastinal adenopathy, R paratracheal/hilar mass measuring 6.5 x 6.2 cm with possible invasion of tracheal lesion   Plan for palliative radiation.  On chemo therapy. Follows Dr. Sprague.   SLP consulted,  pt not tolerating any type of diet.   GI consulted given concern for worsening dysphagia and pt may need alternative feeding route  Hem/onc consulted     Elevated troponin. 54-> 58. Likely demand from dehydration and possible sepsis. Cardiology consulted by ED.  - monitor on tele    Intractable headache. Likely 2/2 brain mets.   Resume home meds if 
    V2.0  [facility] [place] Hospitalist Progress Note      Name:  Mateo Street /Age/Sex: 1945  (78 y.o. male)   MRN & CSN:  [id number] & [id number] Encounter Date/Time: 2024 10:24 AM EST    Location:  [address] [place] [address] PCP: Salvador Riley MD       Hospital Day: 10      Subjective:     Chief Complaint:  Fatigue and Shortness of Breath     TF was apparently only resumed early this morning. Nursing team escalating per order.  Nursing communication order in place NOT to check gastric volume residual even with advancing TF  Has increased O2 requirement 6L - saturation maintained - weaning down as able.  HR controlled.  Complained of left ear ache - examined with otoscope - no abnormalities noted.   Leukocytosis up-trending likely in the setting of steroid use. Repeat infectious workup showed candidemia. Started on fluconazole.   Echo WNL.  Precert for [facility] received on 1/10/2024 - will be good for at least a week.  Tube feed at goal.    Yuriy in  blood cultures from 2024. Repeat culture pending. ID onboard. Treatment started. To discontinue if concern for contamination as repeat culture was sent prior to initiation of antifungal.    Atrial flutter with variable block  Respiratory distress.    Hold tube feed.  Obtain abdominal xray    Discussed with family regarding code status - changed to [id number].    Overall poor prognosis.    No n/v/d.    Assessment and Plan:   Acute hypoxic hypercapnic respiratory failure  2/2 aspiration pneumonitis and suspected COPD exacerbation. Hx of COPD, and lung cancer.  pH 7.25, pCO2 71.   Was on  BIPAP down to 3L NC but up to 5L NC  Repeat CXR  atelectasis vs infiltrates no sign of worsening infection  - steroids per pulm, breathing tx  - supportive therapy  - completed cefepime  - resume COPD home meds  - pulm onboard  - wean O2 as tolerated ; goal spO2>90%     SIRS 2/4 vs sepsis 2/2 bacterial pneumonia although less likely. P/w tachycardia, leukocytosis could be 2/2 
    V2.0  [facility] [place] Hospitalist Progress Note      Name:  Mateo Street /Age/Sex: 1945  (78 y.o. male)   MRN & CSN:  [id number] & [id number] Encounter Date/Time: 1/10/2024 10:24 AM EST    Location:  [address] [place] [address] PCP: Salvador Riley MD       Hospital Day: 9      Subjective:     Chief Complaint:  Fatigue and Shortness of Breath     TF was apparently only resumed early this morning. Nursing team escalating per order.  Nursing communication order in place NOT to check gastric volume residual even with advancing TF  Has increased O2 requirement 6L - saturation maintained - weaning down as able.  HR controlled.  Complained of left ear ache - examined with otoscope - no abnormalities noted.   Leukocytosis up-trending likely in the setting of steroid use. Repeat infectious workup is pending.   Echo WNL.  Precert for [facility] received on 1/10/2024 - will be good for at least a week.  Tube feed at goal.    Yuriy in  blood cultures from 2024. ID consulted. To treat only if repeat culture is positive. Concern for contamination.    Overall poor prognosis.    No n/v/d.    Assessment and Plan:   Acute hypoxic hypercapnic respiratory failure  2/2 aspiration pneumonitis and suspected COPD exacerbation. Hx of COPD, and lung cancer.  pH 7.25, pCO2 71.   Was on  BIPAP down to 3L NC but up to 5L NC  Repeat CXR  atelectasis vs infiltrates no sign of worsening infection  - steroids per pulm, breathing tx  - supportive therapy  - completed cefepime  - resume COPD home meds  - pulm onboard  - wean O2 as tolerated ; goal spO2>90%     SIRS 2/4 vs sepsis 2/2 bacterial pneumonia although less likely. P/w tachycardia, leukocytosis could be 2/2 dehydration. CXR no acute changes.   Resp cx negative so far. MRSA negative.    Strep, legionella negative. Blood cxX2 - pending.  Was on IVF now stopped.  - completed  Cefepime 7 days     Candidemia vs contamination  -repeat culture pending  -ID onboard  -plan to treat only if repeat 
   01/10/24 1701   Encounter Summary   Encounter Overview/Reason  Initial Encounter   Service Provided For: Patient   Referral/Consult From: [facility]   Last Encounter  01/10/24   Complexity of Encounter Low   Begin Time 1600   End Time  1615   Total Time Calculated 15 min   Spiritual/Emotional needs   Type Spiritual Support   Grief, Loss, and Adjustments   Type Adjustment to illness   Assessment/Intervention/Outcome   Assessment Coping   Intervention Sustaining Presence/Ministry of presence   Outcome Coping   Plan and Referrals   Plan/Referrals Continue Support (comment)       
  ATTEMPT  Attempt on floor at 0936, RN reports that pt is not appropriate for therapy this date dt HR dropping into 30s yesterday and newly placed vapotherm. Therapy to f/u as schedule allows and pending pt appropriateness.   Electronically signed by:    Dorie Urbina, PTA  1/12/2024, 9:39 AM      
  Occupational Therapy Treatment Note    Name: Mateo Street MRN: [id number] :   1945   Date:  2024   Admission Date: 2024 Room:  [address] [place] [address]-A     Attempted to see pt this afternoon but pt had procedure today having a PEG tube placed in. Pt refusing therapy and asked to come back another day. Will re-attempt as schedule allows.       Electronically signed by:    ALCIDES Roe,   2024, 12:51 PM        
  Physician Progress Note      PATIENT:               PRAVEEN DARLING  CSN #:                  [id number]  :                       1945  ADMIT DATE:       2024 3:38 PM  DISCH DATE:  RESPONDING  PROVIDER #:        Mitch Somers MD          QUERY TEXT:    Patient admitted with aspiration pneumonitis.   Noted documentation of sepsis   vs SIRS in PN by Dr. Somers dated 1/3/24.  If possible, please document in progress notes and discharge summary if you   are evaluating and /or treating any of the following:    The medical record reflects the following:  Risk Factors: aspiration PNA source of infection  Clinical Indicators: Initial VS: T 97/2, P 114-122, RR 15-26, 96-99% on cpap.    LABS:  WBC 14.7<15.3, Procal 0.136. Aspiration PNA source of infection. CXR:   Bilateral upper lobe nodular opacities again noted  Treatment: Cefepime IV, 2L NS IV Bolus, Vanco IV, imaging, labs    Thank you,  Serena Lyles RN  Options provided:  -- Sepsis confirmed and SIRS ruled out  -- SIRS confirmed and Sepsis ruled out  -- Other - I will add my own diagnosis  -- Disagree - Not applicable / Not valid  -- Disagree - Clinically unable to determine / Unknown  -- Refer to Clinical Documentation Reviewer    PROVIDER RESPONSE TEXT:    After study, SIRS confirmed and Sepsis) ruled out.    Query created by: Jagruti Lyles on 2024 9:45 AM      Electronically signed by:  Mitch Somers MD 2024 2:40 PM          
0000 assessment completed and patient states very anxious.  Patient asking for something to calm his nerves and has been getting more anxious since fighting cancer.   Messaged Hospitalist NP  
1111 - Pt resp status continuing to decline. This RN called pt son to notify him of change in status.     1140 - Pt son at bedside, with decline in status ok to pursue inpatient hospice. Pain meds given per orders from Dr. Macdonald to help comfort patient.      1200 - RN to bedside to reassess pt. Per son, pt stopped breathing just a minute ago. This RN got Dr. Macdonald to bedside who assessed pt absence of respirations and pulses. Time of death declared at 1205 by Dr. Macdonald.    [facility], the , house supervisor, and hospice all notified of pt passing. Refer to post-mortem doc flowsheet.  
Abdomen soft  PEG site okay  Appears to be tolerating PEG feeds better  Continue Reglan continue to monitor for residuals  Abdomen soft  Will sign off call if needed    I have seen and examined this patient personally, and independently of Wendy Jernigan    The plan was developed mutually at the time of the visit with the patient.  Wendy and myself have spoken with patient, nursing staff and provided written and verbal instructions .    The above note has been reviewed and I agree with the Assessment,  Diagnosis, and Treatment plan as suggested by WENDY JERNIGAN.  I have also suggested more changes to the therapy plan , which is being implemented.    I have also put in a separate note indicating my examination findings and recommendations      Ventura Qureshi MD  [facility]    Please note time of note may not reflect time of encounter       [facility]    [facility]  Progress Note  2024  10:10 AM  ___________________________________________________________________________  Patient:    Mateo Street  : 1945   78 y.o.             MRN: [id number]  Admitted: 2024  3:38 PM ATT: Robert Bernal MD   [id number]/[id number]  AdmitDx: COPD exacerbation (HCC) [J44.1]  History of cancer [Z85.9]  SIRS (systemic inflammatory response syndrome) (HCC) [R65.10]  Troponin level elevated [R79.89]  Sepsis (HCC) [A41.9]  Other fatigue [R53.83]  Dyspnea, unspecified type [R06.00]  PCP: Salvador Riley MD  ___________________________________________________________________________  ASSESSMENT AND PLAN :    Impression:   Dysphagia, PEG placed  Metastatic lung cancer  COPD  Atrial flutter, Xarelto  DM    Discussion:  78 y.o. 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 May 
Attempted COPD Gold, but was unable to wake the pt.   
Attempted NG insertion unsuccessful given meeting resistance and NG TUBE coiling. DOREEN Benz Notified.     
Cardiology Progress Note    Assessment/Plan:  Atrial flutter  -Rate is well-controlled at this time.  -Continue Lopressor 75 mg twice daily and Cardizem 60 mg 4 times daily  -Currently on Xarelto 20 mg daily  Aspiration pneumonitis  COPD  Metastatic lung cancer with metastasis to brain  -Oncology and pulmonology following  Hyperlipidemia  -Continue Lipitor 40 mg daily  PEG tube  -GI following          Nate Ray PA-C 01/09/24 3:40 PM        
Cardiology Progress Note    Subjective:  Patient states that he is feeling fairly sluggish this morning with some shortness of breath.  Some mild lightheadedness as well.  Patient does have very poor overall prognosis however is still currently full code.    Care was discussed with nursing staff.    Assessment/Plan:  Atrial flutter  -Is currently atrial flutter with slow ventricular response heart rate is in the 30s-40s.  -No immediate need for pacemaker placement at this time.  Blood pressure is stable currently.  -Echo showing preserved EF without any wall motion abnormalities.  -Goal potassium 4.0-4.5, magnesium 2.0-2.2. Replete per protocol   -Hold all rate control medications at this time  -Currently on Xarelto 20 mg daily.  Anticoagulation was interrupted earlier during this hospitalization.  No plans for cardioversion at this time  Aspiration pneumonitis  COPD  Metastatic lung cancer with metastasis to brain  -Oncology and pulmonology following  Hyperlipidemia  -Continue Lipitor 40 mg daily  PEG tube  -GI following      Echo 01/08/24:    Left Ventricle: Normal left ventricular systolic function with a visually estimated EF of 55 - 60%.    Mitral Valve: Mild annular calcification at the posterior leaflet of the mitral valve.    Tricuspid Valve: Mild to moderate regurgitation. The estimated RVSP is 40 mmHg.    Pericardium: No pericardial effusion.    Nate Ray PA-C 01/11/24 12:06 PM        
Clinical Pharmacy Progress Note    Vancomycin has been discontinued. Pharmacy will sign off. Please re-consult pharmacy if vancomycin dosing is wanted in the future.    Please call with questions.  Danyell Sanches, PharmD, [facility], [facility]  Main Pharmacy: [id number]  1/4/2024 9:08 AM     
Comprehensive Nutrition Assessment    Type and Reason for Visit:  Initial, Positive Nutrition Screen    Nutrition Recommendations/Plan:   Recommend placing enteral access to start TF if patient is agreeable.   Recommend starting Glucerna 1.5 (diabetic formula) @ 60ml/hr to provide 2160kcal, 119g PRO. Advance rate slowly, as pt at risk for refeeding syndrome.   May continue current oral diet  Monitor weight, po intakes, labs, POC     Malnutrition Assessment:  Malnutrition Status:  Insufficient data (01/03/24 1138)    Context:  Chronic Illness       Nutrition Assessment:    Admitted w/ sepsis, PMH of metastatic non-small cell carcinoma, COPD, atrial flutter. Pt on chemo for metastatic lung cancer w/ mets to brain, trachea. Pt sleeping soundly at visit on bipap. Noted significant wt losses of 9.4% x<1mo, 18.8% x2mo, 31% x10mo. SLP recommending consideration of alternate route of nutrition due to poor po intake/swallowing difficulty, pt unable to swallow po pills per notes. Would recommend PEG tube placement if pt agreeable and looking to pursue aggressive nutrition intervention. May consider NGT placement temporarily. Will provide recs. Follow at high risk.    Nutrition Related Findings:    +NaCl, solumedrol; elevated troponin, K 5.2, glucose 110-123 Wound Type: None       Current Nutrition Intake & Therapies:    Average Meal Intake: Unable to assess  Average Supplements Intake: None Ordered  ADULT DIET; Clear Liquid; Mildly Thick (Nectar)    Anthropometric Measures:  Height: 175.3 cm (5' 9\")  Ideal Body Weight (IBW): 160 lbs (73 kg)    Admission Body Weight: 65.5 kg (144 lb 6.4 oz)  Current Body Weight: 65.5 kg (144 lb 6.4 oz),   IBW. Weight Source: Bed Scale  Current BMI (kg/m2): 21.3  Usual Body Weight: 95.3 kg (210 lb) (3/2023)  % Weight Change (Calculated): -31.2  Weight Adjustment For: No Adjustment                 BMI Categories: Underweight (BMI less than 22) age over 65    Estimated Daily Nutrient 
Comprehensive Nutrition Assessment    Type and Reason for Visit:  Reassess    Nutrition Recommendations/Plan:   Continue NPO  Maintain adequate hydration via PEG tube     Malnutrition Assessment:  Malnutrition Status:  Severe malnutrition (01/05/24 1409)    Context:  Chronic Illness     Findings of the 6 clinical characteristics of malnutrition:  Energy Intake:  Mild decrease in energy intake   Weight Loss:  Greater than 20% over 1 year     Body Fat Loss:  Severe body fat loss Orbital, Buccal region   Muscle Mass Loss:  Severe muscle mass loss Temples (temporalis), Clavicles (pectoralis & deltoids), Hand (interosseous)  Fluid Accumulation:  No significant fluid accumulation     Strength:  Not Performed    Nutrition Assessment:    Pt going home with hospice once home equipment delivered noted, Oncology signed off for now. EN not running during visit, has apparently been off since 1/14/24. No IVF, free water via PEG tube noted. Continue to follow as high nutrition risk.    Nutrition Related Findings:    Glu 194  Reglan, Glycolax, Senokot   Wound Type: None       Current Nutrition Intake & Therapies:    Average Meal Intake: NPO  Average Supplements Intake: NPO  Diet NPO Exceptions are: Ice Chips, Sips of Water with Meds  ADULT TUBE FEEDING; PEG; Diabetic; Continuous; 20; Yes; 20; Q 4 hours; 60; 175; Q 4 hours  Current Tube Feeding (TF) Orders:  Feeding Route: PEG  Formula: Diabetic  Schedule: Continuous  Feeding Regimen: 60 ml/hr  Water Flushes: 30 ml Q4H  Current TF & Flush Orders Provides:  EN not running since 1/14/24  Goal TF & Flush Orders Provides: 2160 kcal, 119 gm protein    Anthropometric Measures:  Height: 175.3 cm (5' 9\")  Ideal Body Weight (IBW): 160 lbs (73 kg)    Admission Body Weight: 65.5 kg (144 lb 6.4 oz)  Current Body Weight: 64 kg (141 lb 1.5 oz),   IBW. Weight Source: Bed Scale  Current BMI (kg/m2): 20.8  Usual Body Weight: 95.3 kg (210 lb) (3/2023)  % Weight Change (Calculated): -31.2  Weight 
Comprehensive Nutrition Assessment    Type and Reason for Visit:  Reassess    Nutrition Recommendations/Plan:   Continue current diet as able, provides some hydration only  Once PEG ready to use, recommend starting Glucerna 1.5 Shayan (diabetic formula) progressed slowly as tolerated to 60 ml/hr to provide 2160 kcal, 119 gm protein  Monitor/replace depleted electrolytes carefully as pt at risk for refeeding syndrome.      Malnutrition Assessment:  Malnutrition Status:  Severe malnutrition (01/05/24 1409)    Context:  Chronic Illness     Findings of the 6 clinical characteristics of malnutrition:  Energy Intake:  Mild decrease in energy intake   Weight Loss:  Greater than 20% over 1 year     Body Fat Loss:  Severe body fat loss Orbital, Buccal region   Muscle Mass Loss:  Severe muscle mass loss Temples (temporalis), Clavicles (pectoralis & deltoids), Hand (interosseous)  Fluid Accumulation:  No significant fluid accumulation     Strength:  Not Performed    Nutrition Assessment:    Pt continues with very minimal oral intake on a diet that provides minimal nutrition. Plan for PEG tube placement tomorrow noted. Pt meets criteria for severe malnutrition. Will provide EN recs and continue to follow as high nutrition risk.    Nutrition Related Findings:    BUN 25, Cr 0.7, Mg 1.6, Phos 2.4   Wound Type: None       Current Nutrition Intake & Therapies:    Average Meal Intake: 0%  Average Supplements Intake: None Ordered  ADULT DIET; Clear Liquid; Mildly Thick (Nectar)  Diet NPO Exceptions are: Ice Chips, Sips of Water with Meds    Anthropometric Measures:  Height: 175.3 cm (5' 9\")  Ideal Body Weight (IBW): 160 lbs (73 kg)    Admission Body Weight: 65.5 kg (144 lb 6.4 oz)  Current Body Weight: 64.8 kg (142 lb 13.7 oz),   IBW. Weight Source: Bed Scale  Current BMI (kg/m2): 21.1  Usual Body Weight: 95.3 kg (210 lb) (3/2023)  % Weight Change (Calculated): -31.2  Weight Adjustment For: No Adjustment                 BMI 
Comprehensive Nutrition Assessment    Type and Reason for Visit:  Reassess    Nutrition Recommendations/Plan:   Continue to advance current EN to goal as tolerated     Malnutrition Assessment:  Malnutrition Status:  Severe malnutrition (01/05/24 1409)    Context:  Chronic Illness     Findings of the 6 clinical characteristics of malnutrition:  Energy Intake:  Mild decrease in energy intake (Comment)  Weight Loss:  Greater than 20% over 1 year     Body Fat Loss:  Severe body fat loss Orbital, Buccal region   Muscle Mass Loss:  Severe muscle mass loss Temples (temporalis), Clavicles (pectoralis & deltoids), Hand (interosseous)  Fluid Accumulation:  No significant fluid accumulation     Strength:  Not Performed    Nutrition Assessment:    TF resumed then held again as residual was checked, GI recommended resuming TF, nursing communication order in place NOT to check gastric volume residual even with advancing TF noted. Reglan tid started yesterday. EN restarted/running at 20 ml/hr per Flowsheets. Will continue to follow as high nutrition risk.    Nutrition Related Findings:    BUN 26, Cr 0.6, Glu 131 Wound Type: None       Current Nutrition Intake & Therapies:    Average Meal Intake: NPO  Average Supplements Intake: NPO  Diet NPO Exceptions are: Ice Chips, Sips of Water with Meds  ADULT TUBE FEEDING; PEG; Diabetic; Continuous; 20; Yes; 20; Q 8 hours; 60; 30; Q 4 hours    Anthropometric Measures:  Height: 175.3 cm (5' 9\")  Ideal Body Weight (IBW): 160 lbs (73 kg)    Admission Body Weight: 65.5 kg (144 lb 6.4 oz)  Current Body Weight: 65.2 kg (143 lb 11.8 oz),   IBW. Weight Source: Bed Scale  Current BMI (kg/m2): 21.2  Usual Body Weight: 95.3 kg (210 lb) (3/2023)  % Weight Change (Calculated): -31.2  Weight Adjustment For: No Adjustment                 BMI Categories: Underweight (BMI less than 22) age over 65    Estimated Daily Nutrient Needs:  Energy Requirements Based On: Kcal/kg  Weight Used for Energy 
Comprehensive Nutrition Assessment    Type and Reason for Visit:  Reassess    Nutrition Recommendations/Plan:   Please resume EN at goal rate of 60 ml/hr as timely as able  W/O IVF, 175 ml free water needed Q4H to meet fluid needs     Malnutrition Assessment:  Malnutrition Status:  Severe malnutrition (01/05/24 1409)    Context:  Chronic Illness     Findings of the 6 clinical characteristics of malnutrition:  Energy Intake:  Mild decrease in energy intake   Weight Loss:  Greater than 20% over 1 year     Body Fat Loss:  Severe body fat loss Orbital, Buccal region   Muscle Mass Loss:  Severe muscle mass loss Temples (temporalis), Clavicles (pectoralis & deltoids), Hand (interosseous)  Fluid Accumulation:  No significant fluid accumulation     Strength:  Not Performed    Nutrition Assessment:    EN was running at 60 ml/hr goal yesterday. Is turned off today. Spoke with Son bedside. Seeking hospice information noted. Will continue current plan and follow as high nutrition risk.    Nutrition Related Findings:    Glu 111    Reglan   Wound Type: None       Current Nutrition Intake & Therapies:    Average Meal Intake: NPO  Average Supplements Intake: NPO  Diet NPO Exceptions are: Ice Chips, Sips of Water with Meds  ADULT TUBE FEEDING; PEG; Diabetic; Continuous; 20; Yes; 20; Q 4 hours; 60; 175; Q 4 hours  Current Tube Feeding (TF) Orders:  Feeding Route: PEG  Formula: Diabetic  Schedule: Continuous  Feeding Regimen: 60 ml/hr  Water Flushes: 175 ml Q4H  Current TF & Flush Orders Provides:  not running at visit  Goal TF & Flush Orders Provides: 2160 kcal, 119 gm protein    Anthropometric Measures:  Height: 175.3 cm (5' 9\")  Ideal Body Weight (IBW): 160 lbs (73 kg)    Admission Body Weight: 65.5 kg (144 lb 6.4 oz)  Current Body Weight: 64 kg (141 lb 1.5 oz),   IBW. Weight Source: Bed Scale  Current BMI (kg/m2): 20.8  Usual Body Weight: 95.3 kg (210 lb) (3/2023)  % Weight Change (Calculated): -31.2  Weight Adjustment For: No 
Ext [phone]  Radiation nurse to call with issues   
Hematology Oncology Progress Note    Patient Name:  Mateo Street  Patient :  1945  Patient MRN:  [id number]     Primary Oncologist: Dr Codi Sprague, Dr Fran Bond (Rad Onc)  Referring Provider: Salvador Riley MD     Date of Service: 2024      HPI:   Mateo Street is a pleasant 78 y.o. 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  Plan has been to start weekly Carbo/Taxol with palliative radiation including to the mediastinum.  He has been struggling with headaches and head and neck pain related to metastatic disease.  Note that he does not have intraparenchymal brain mass however has possible extradural lesion as well as lesions in the cervical spine.  He presented this admission with acute hypoxic/hypercapnic respiratory failure secondary to aspiration pneumonitis and possible COPD exacerbation in the setting of lung cancer.  He is currently being treated for pneumonia.  He is also have very poor appetite and ongoing weight loss and weakness.  Continues to complain of headaches and neck pain.  Had planned to start palliative radiation on 2024.  Dr. Bond did evaluate need inpatient and recommends starting as an inpatient and has reached out to hospital administration to advocate that this is done.  Patient was also evaluated by GI for possible PEG placement, was awaiting improvement of respiratory status.    On exam today patient is fatigued with general malaise.  Oxygenation is stable on 3 L nasal cannula and SOB has improved.  Hemodynamically stable. Feels that food is stuck as he is swallowing and is progressively worsening. He is agreeable to enteral feeding tube placement once medically stable. No N/V or change in bowel/bladder habits. No fever/chills.    Interval    2024  Patient remains fatigued.  Shortness of breath has improved.  Continuing to have trouble swallowing all 
Hematology Oncology Progress Note    Patient Name:  Mateo Street  Patient :  1945  Patient MRN:  [id number]     Primary Oncologist: Dr Codi Sprague, Dr Fran Bond (Rad Onc)  Referring Provider: Salvador Riley MD     Date of Service: 2024      HPI:   Mateo Street is a pleasant 78 y.o. 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  Plan has been to start weekly Carbo/Taxol with palliative radiation including to the mediastinum.  He has been struggling with headaches and head and neck pain related to metastatic disease.  Note that he does not have intraparenchymal brain mass however has possible extradural lesion as well as lesions in the cervical spine.  He presented this admission with acute hypoxic/hypercapnic respiratory failure secondary to aspiration pneumonitis and possible COPD exacerbation in the setting of lung cancer.  He is currently being treated for pneumonia.  He is also have very poor appetite and ongoing weight loss and weakness.  Continues to complain of headaches and neck pain.  Had planned to start palliative radiation on 2024.  Dr. Bond did evaluate need inpatient and recommends starting as an inpatient and has reached out to hospital administration to advocate that this is done.  Patient was also evaluated by GI for possible PEG placement, was awaiting improvement of respiratory status.    On exam today patient is fatigued with general malaise.  Oxygenation is stable on 3 L nasal cannula and SOB has improved.  Hemodynamically stable. Feels that food is stuck as he is swallowing and is progressively worsening. He is agreeable to enteral feeding tube placement once medically stable. No N/V or change in bowel/bladder habits. No fever/chills.    Interval    2024  Patient remains fatigued.  Shortness of breath has improved.  Continuing to have trouble swallowing all 
Hematology Oncology Progress Note    Patient Name:  Mateo Street  Patient :  1945  Patient MRN:  [id number]     Primary Oncologist: Dr Codi Sprague, Dr Fran Bond (Rad Onc)  Referring Provider: Salvador Riley MD     Date of Service: 2024      HPI:   Mateo Street is a pleasant 78 y.o. 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  Plan has been to start weekly Carbo/Taxol with palliative radiation including to the mediastinum.  He has been struggling with headaches and head and neck pain related to metastatic disease.  Note that he does not have intraparenchymal brain mass however has possible extradural lesion as well as lesions in the cervical spine.  He presented this admission with acute hypoxic/hypercapnic respiratory failure secondary to aspiration pneumonitis and possible COPD exacerbation in the setting of lung cancer.  He is currently being treated for pneumonia.  He is also have very poor appetite and ongoing weight loss and weakness.  Continues to complain of headaches and neck pain.  Had planned to start palliative radiation on 2024.  Dr. Bond did evaluate need inpatient and recommends starting as an inpatient and has reached out to hospital administration to advocate that this is done.  Patient was also evaluated by GI for possible PEG placement, was awaiting improvement of respiratory status.    On exam today patient is fatigued with general malaise.  Oxygenation is stable on 3 L nasal cannula and SOB has improved.  Hemodynamically stable. Feels that food is stuck as he is swallowing and is progressively worsening. He is agreeable to enteral feeding tube placement once medically stable. No N/V or change in bowel/bladder habits. No fever/chills.    Interval    2024  Patient remains fatigued.  Shortness of breath has improved.  Continuing to have trouble swallowing all 
Hematology Oncology Progress Note    Patient Name:  Mateo Street  Patient :  1945  Patient MRN:  [id number]     Primary Oncologist: Dr Codi Sprague, Dr Fran Bond (Rad Onc)  Referring Provider: Salvador Riley MD     Date of Service: 2024      HPI:   Mateo Street is a pleasant 78 y.o. 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  Plan has been to start weekly Carbo/Taxol with palliative radiation including to the mediastinum.  He has been struggling with headaches and head and neck pain related to metastatic disease.  Note that he does not have intraparenchymal brain mass however has possible extradural lesion as well as lesions in the cervical spine.  He presented this admission with acute hypoxic/hypercapnic respiratory failure secondary to aspiration pneumonitis and possible COPD exacerbation in the setting of lung cancer.  He is currently being treated for pneumonia.  He is also have very poor appetite and ongoing weight loss and weakness.  Continues to complain of headaches and neck pain.  Had planned to start palliative radiation on 2024.  Dr. Bond did evaluate need inpatient and recommends starting as an inpatient and has reached out to hospital administration to advocate that this is done.  Patient was also evaluated by GI for possible PEG placement, was awaiting improvement of respiratory status.    On exam today patient is fatigued with general malaise.  Oxygenation is stable on 3 L nasal cannula and SOB has improved.  Hemodynamically stable. Feels that food is stuck as he is swallowing and is progressively worsening. He is agreeable to enteral feeding tube placement once medically stable. No N/V or change in bowel/bladder habits. No fever/chills.    Interval    2024  Patient remains fatigued.  Shortness of breath has improved.  Continuing to have trouble swallowing all 
Infectious Disease Progress Note  2024   Patient Name: Mateo Street : 1945   Impression  Candida albicans  in Blood Culture: Probable Contaminant:  Acute Left Axillary DVT:  No reported allergies to ABX  CrCl 76  Afebrile, leukocytosis persistent at 18.6 (may be steroid-induced), Pct 0.126, 0.109. CRP 18, 12.   -Resp panel negative, resp culture normal resp dipika x 2 specimens  1/3-MRSA by PCR negative  -Urine culture: NGTD  -Strep pna and Legionella ag negative  -BC 0/2 NGTD  -BC  Candida albicans  1/10-BC 0/ NGTD  -Portable CXR: Bilateral upper lobe nodular opacities again noted. No acute cardiopulmonary process.  -Portable CXR:  Stable examination, no acute cardiopulmonary abnormality.   -Portable CXR: Left lung base atelectasis or infiltrate. Recommend follow-up imaging to confirm resolution.   -Vascular duplex LUE: Positive acute DVT within the left axillary vein.   -Complete TTE: Left Ventricle: Normal left ventricular systolic function with a visually estimated EF of 55 - 60%. Mitral Valve: Mild annular calcification at the posterior leaflet of the mitral valve.Tricuspid Valve: Mild to moderate regurgitation. The estimated RVSP is 40 mmHg.Pericardium: No pericardial effusion.     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 Sprague, oncology, treatment naive, planning for Carbo/Taxol/Pembro + palliative radiation to skull base/ mediastinal/ hilar disease  Dr. Bond, radiation oncology, imp of mets to skull base, not brain.  Started radiation therapy on   Dysphagia:   -S/p Dr. Whitehead: EGD with PEG tube placement.   DMII:  Acute on Chronic Hypoxic Respiratory Failure: Exacerbation of COPD/ Oxygen Dependent 3 L/min/NC:  Dr. Tate, pulmonology, imp of acute resp failure from aspiration pneumonia and exacerbation of COPD. Started steroid therapy with Solu-Medrol on -CT Chest W Contrast: Pending  Alcohol Abuse: Reports 
[facility]  DEPARTMENT OF SPEECH/LANGUAGE PATHOLOGY  DAILY PROGRESS NOTE  Mateo Street  1/5/2024  [id number]  COPD exacerbation (HCC) [J44.1]  History of cancer [Z85.9]  SIRS (systemic inflammatory response syndrome) (HCC) [R65.10]  Troponin level elevated [R79.89]  Sepsis (HCC) [A41.9]  Other fatigue [R53.83]  Dyspnea, unspecified type [R06.00]  No Known Allergies      Pt was seen this date for dysphagia treatment.       IMPRESSION AND RECOMMENDATIONS:   Mateo Street was seen for a bedside swallowing treatment and diet tolerance monitoring.  Pt was alert and cooperative throughout assessment.  He reports disliking the thickened liquids d/t them making him feel nauseous.  For today's assessment pt was positioned upright in bed and accepted limited PO trials of nectar thick liquids by spoon sip, ice chips and thin liquids by spoon sip.  Adequate oral manipulation was observed with liquid trials.  Pharyngeal swallow appeared delayed with reduced laryngeal elevation.  Pt demonstrated an immediate cough with thin liquids by spoon sip.  Continued to require frequent suctioning with PO trials d/t thick secretions.  Clear vocal quality and 0 overt s/s of aspiration were observed with trials of nectar thick liquids by spoon sip.  Pt deferred continued trials at this time d/t reduced appetite.      Recommend continue clear liquid diet/nectar thick liquids with strict aspiration precautions.  Noted plans for PEG-tube placement on Saturday.  Will continue to follow.     GOALS (current status in bold):  Short-term Goals  Timeframe for Short-term Goals: LOS or until goals are met  Goal 1: Pt will tolerate clear liquids/nectar thick liquids without clinical evidence of aspiration 100% Goal being met, continue   Goal 2: Pt/caregivers will demonstrate comprehension of recommendations/POC Goal being met, continue           EDUCATION: recommendations/POC    PAIN RATING (0-10 Scale): denies   Time 
Medications administered through PEG rube and tube flushed without issue. Tube feed started at initial rate, will monitor patient's tolerance.  
Meds given per MAR for anxiety and patient is now stating has headache and medicated per MAR for this as well.  Oxygen requirements are up to 6L NC and sats 92%    Continue to monitor closely   
Nurse reports high residuals  PEG feeds stop  Reglan started yesterday  Patient doing relatively well  On examination of the abdominal wall PEG was found to be lax with high play  PEG was gently pulled and external skin flange was adjusted to be around 5 cm  Discussed about this with patient and nursing staff to keep external flange around 5 cm  Will restart PICC feeds today at 20 cc an hour  If tolerated can advance  Also discussed with patient and advised him that he can eat for taste if needed    I have seen and examined this patient personally, and independently of Wendy Jernigan    The plan was developed mutually at the time of the visit with the patient.  Wendy and myself have spoken with patient, nursing staff and provided written and verbal instructions .    The above note has been reviewed and I agree with the Assessment,  Diagnosis, and Treatment plan as suggested by WENDY JERNIGAN.  I have also suggested more changes to the therapy plan , which is being implemented.    I have also put in a separate note indicating my examination findings and recommendations      Ventura Qureshi MD  [facility]    Please note time of note may not reflect time of encounter       [facility]    [facility]  Progress Note  2024  10:58 AM  ___________________________________________________________________________  Patient:    Mateo Street  : 1945   78 y.o.             MRN: [id number]  Admitted: 2024  3:38 PM ATT: Mitch Somers MD   3108/3108-A  AdmitDx: COPD exacerbation (HCC) [J44.1]  History of cancer [Z85.9]  SIRS (systemic inflammatory response syndrome) (HCC) [R65.10]  Troponin level elevated [R79.89]  Sepsis (HCC) [A41.9]  Other fatigue [R53.83]  Dyspnea, unspecified type [R06.00]  PCP: Salvador Riley MD  ___________________________________________________________________________  ASSESSMENT AND PLAN :    Impression:   Dysphagia  Metastatic lung cancer  COPD  Atrial flutter, 
Nurse to bedside in response to tele alarm 's. Apical HR counted at 80. NP notified.  
Occupational Therapy    Attempted at 1000 hrs c nurse reporting pt is not medically appropriate for Tx at this time. Note that pt was attempted on 1/12 and was also not medically appropriate. Will continue to follow per OT POC for third attempt in coming days as schedule allows and for potential review for continue OT plan of care.     Electronically signed by:    ERAL Vann  1/15/2024, 11:39 AM        
Occupational Therapy    Occupational Therapy Treatment Note    Name: Mateo Street MRN: [id number] :   1945   Date:  2024   Admission Date: 2024 Room:  [address] [place] [address]-A     Primary Problem: Acute respiratory failure, Pneumonia, History of metastatic lung cancer    Restrictions/Precautions: General Precautions, Fall Risk, 7L o2, IV, PEG tube, Telemetry, Pulse Ox, BP cuff, Bed/chair alarm     Communication with other providers: RN    Subjective:  Patient states: \"I feel pretty crappy today.\"  Pain: Pt denied pain this date, just endorsed a feeling of malaise     Objective:    Observation: Pt received sitting EOB upon OT arrival. Agreeable to treatment.  Objective Measures: Stable vitals throughout session on 7L o2    Treatment, including education:  Therapeutic Activity Training:   Therapeutic activity training was instructed today.  Cues were given for safety, sequence, UE/LE placement, awareness, and balance.      Pt received sitting EOB upon OT arrival. Pt re-educated on role of OT, POC, and importance of OOB activity. Pt completed sit to stand transfer from bed CGA with min cues for safe hand placement. Pt endorsed feeling lightheaded upon standing and provided cues for pursed lip breathing but all vitals stable. Lightheadedness resolved, and pt requesting to ambulate. Pt ambulated ~70 ft CGA with RW. Pt with improved steadiness and RW mgmt this date as well as stable vitals. Pt with significantly decreased speed and step-length however. Pt returned to room and transferred stand to sit to chair CGA with cues for reaching back with arms. Pt declined completing any ADLs this date. Pt left positioned for comfort in chair with all lines intact, all needs within reach, and chair alarm on. Increased time/effort required for all task completion this date.    Assessment / Impression:    Patient's tolerance of treatment: Fair+/Well  Adverse Reaction: Decreased overall act  Significant change in status and 
Occupational Therapy  Attempted to see pt on this date for OT session. Per RN patient a medical hold at this time. Will attempt as able and appropriate.    Maddie DAILEY/L  
PEG tube yesterday  PEG site appears to be okay  Continue to advance PEG feeds    I have seen and examined this patient personally, and independently of Wendy Jernigan    The plan was developed mutually at the time of the visit with the patient.  Wendy and myself have spoken with patient, nursing staff and provided written and verbal instructions .    The above note has been reviewed and I agree with the Assessment,  Diagnosis, and Treatment plan as suggested by WENDY JERNIGAN.  I have also suggested more changes to the therapy plan , which is being implemented.    I have also put in a separate note indicating my examination findings and recommendations      Ventura Qureshi MD  [facility]    Please note time of note may not reflect time of encounter       [facility]    [facility]  Progress Note  2024  8:59 AM  ___________________________________________________________________________  Patient:    Mateo Street  : 1945   78 y.o.             MRN: [id number]  Admitted: 2024  3:38 PM ATT: Mitch Somers MD   3108/3108-A  AdmitDx: COPD exacerbation (HCC) [J44.1]  History of cancer [Z85.9]  SIRS (systemic inflammatory response syndrome) (HCC) [R65.10]  Troponin level elevated [R79.89]  Sepsis (HCC) [A41.9]  Other fatigue [R53.83]  Dyspnea, unspecified type [R06.00]  PCP: Salvador Riley MD  ___________________________________________________________________________  ASSESSMENT AND PLAN :    Impression:   Dysphagia  Metastatic lung cancer  COPD  Atrial flutter, Xarelto  DM    Discussion:  78 y.o. male with h/o PMH of metastatic non-small cell carcinoma to brain, COPD, atrial flutter who presents with days of SOB, fatigue and poor oral intake  GI consulted for hx of metastatic lung cancer to [facility] area, was supposed to get palliative radiation has been having worsening dysphagia. May need further evaluation of esophagus and another nutrition route. He states trouble with solids and 
Patient having trouble keeping sats up.  Demetrice LEDESMA, called and states that she placed NRB on patient. Dr Bernal ordered Vapotherm. Patient placed on Vapotherm 35 lpm 100%. Will continue to monitor. Patient sating 92%.  
Patient having urinary retention per Dayshift RN, Patient unable to void since late afternoon.  Upon My assessment Patient attempting to void per urinal w/o success.   Bladder scan shows 422cc.  Hospitalist  notified,orders placed for Díaz to GD.  @ 2300 Díaz placed, returned 350cc of clear, yellow urine.   
Patient instructed and educated on Incentive Spirometer. Patient able to do 500 ml. Vital capacity. Patient's goal is 2500ml. Electronically signed by Josafat Eaton RCP on 1/4/2024 at 5:38 PM  
Patient loss IV access. He is a cancer Patient difficult stick. IV team consulted  
Patient returned from endo with PEG tube in place. Peg tube hooked to gravity drainage bag with small amount of brown drainage in bag. Patient reports mild soreness but otherwise no pain. Vital signs are stable. Patient assisted to comfortable position and is resting. Orders released.  
Patient states that he is feeling okay. He is not as short of breath.     Plan:   Atrial flutter: remains in atrial flutter this morning. He is rate controlled. Continue metoprolol 25 mg 4 times a day. SP peg placement. Restart AC when surgery agreeable.     Elevated troponin: in setting of tachycardia and hypoxia. Continue to monitor. He denies chest pain.       
Patient to endo for PEG tube placement at this time.  
Per patient, had not had BP for over a week, Dulcolax suppository and Miralax given. Patient had multiple bowel movements since.   
Physical Therapy  Name: Mateo Street MRN: [id number] :   1945   Date:  2024   Admission Date: 2024 Room:  [address] [place], [place] [address]   Restrictions/Precautions:        Fall Risk, Peg, SpO2, General Precautions    Communication with other providers:  Katie RN states pt is ok to see for therapy at 1110, however CLARY Martinez is concluding session. PT in afternoon and RN sattes that patient will be soon transferred to radiation   Discharge recommendation: Encourage residential facility for post-acute rehabilitation, anticipate 1-2 hours per day and 5 days per week.     Subjective:  Patient states:  \"I'd like to get back to bed\" Patient expresses that he is moderately fatigued and would like to transfer back to bed. Limited session d/t patient scheduled for transport with radiation   Pain:   Location, Type, Intensity (0/10 to 10/10):  does not c/o pain    Objective:    Observation:  Patient is seated in recliner upon arrival. SpO2 89-90% with RN increasing NC O2 from 6 L to 8 L with good return to 91-92%    Treatment, including education/measures:    Transfers with line management of Tele Monitor, IV, Pulse Ox, PEG, BP Cuff, NC O2  Scooting :Seated scooting SBA  SPT:Stand pivot without AD Min A for steadying, with slow pacing and sequence  Sit to supine :CGA for trunk and BLE management     Safety  Patient left safely in the bed, with call light/phone in reach with alarm applied. Gait belt used for transfers and gait.    Assessment / Impression:   Patient demos fatigue with session. Limited session d/t time constraint of patient transfer to radiation. Continue to recommend post-acute rehab at a facility to address endurance and functional strength with mobility.   Patient's tolerance of treatment:  Fair   Adverse Reaction: SpO2 desat and fatigue  Significant change in status and impact:  none  Barriers to improvement:  medical status, endurance and strength     Plan for Next Session:    Will cont to work towards pt's 
Pt placed back on BIPAP per VBG results. Discussed with nurse.   
Pt placed on BiPAP.  Four different masks attempted before finding the right fit where leak wasn't too high due to face shape and thick beard.  Pt comfortable on ordered settings with no distress noted.  RN to draw repeat VBG in 45 mins to an hour.       01/02/24 1843   NIV Type   $NIV $Daily Charge   NIV Started/Stopped On   Equipment Type v60   Mode Bilevel   Mask Type Full face mask   Mask Size Large   Bonnet size Large   Assessment   Pulse (!) 122   Heart Rate Source Monitor   Respirations 15   SpO2 98 %   Using Accessory Muscles No   Mask Compliance Fair   Skin Assessment Clean, dry, & intact   Settings/Measurements   PIP Observed 15 cm H20   IPAP 15 cmH20   CPAP/EPAP 5 cmH2O   Vt (Measured) 628 mL   Rate Ordered 14   Insp Rise Time (%) 3 %   FiO2  45 %   I Time/ I Time % 1 s   Minute Volume (L/min) 9.6 Liters   Mask Leak (lpm) 59 lpm   Patient's Home Machine No   Alarm Settings   Alarms On Y   Low Pressure (cmH2O) 5 cmH2O   High Pressure (cmH2O) 20 cmH2O   Delay Alarm 20 sec(s)   RR Low (bpm) 13   RR High (bpm) 45 br/min       
Pt refused to have bs checked this morning. He stated his fingers are hurting and he didn't want to have it done again. Education provided.   
Pt returned from cancer center. Hooked back up to monitor.     Iv nurse at bedside using u/s for access.  
[facility] with post [facility]em doc report. Notified house supervisor, Alycia.   
Pt very anxious tonight. Trying to climb out of bed, pulling at things and picking at SpO2. ALLISON Kurtz NP notified, Ativan changed from PO to IV.   
Pts agitation continued after PRN meds were given, still trying to climb out of bed and pulling at things. RR 30, O2 75-80%, -120s. O2 at 11L now. Called Son and updated on shift events. Spoke with son regarding Vitals and the need to medicate to keep Pt comfortable. Son stated he would come up and to \"do what we need to, to make him more comfortable\". ALLISON Kurtz RN notified.   
Pulmonary and Critical Care  Progress Note    Subjective:   The patient has improved.On 3 L N/C.  Shortness of breath has improved  Chest pain none.  Addressing respiratory complaints Patient is negative for  hemoptysis and cyanosis  CONSTITUTIONAL:  negative for fevers and chills      Past Medical History:     has a past medical history of Alcohol abuse, Atrial flutter (HCC), COPD (chronic obstructive pulmonary disease) (HCC), Diabetes mellitus (HCC), H/O 24 hour EKG monitoring, H/O cardiovascular stress test, H/O echocardiogram, History of cardiac monitoring, History of cardioversion, Hx of echocardiogram, Hyperlipidemia, Hypertension, and Obesity.   has a past surgical history that includes Atrial ablation surgery (10/2006); Atrial ablation surgery (10/06/2006); Infected skin debridement (03/2012); Colonoscopy (03/03/2015); hernia repair; and CT NEEDLE BIOPSY LUNG PERCUTANEOUS (11/29/2023).   reports that he quit smoking about 21 years ago. His smoking use included cigarettes. He started smoking about 61 years ago. He has a 20.0 pack-year smoking history. He has never used smokeless tobacco. He reports that he does not currently use alcohol. He reports that he does not use drugs.  Family history:  family history includes High Blood Pressure in his brother.    No Known Allergies  Social History:    Reviewed; no changes    Objective:   PHYSICAL EXAM:        VITALS:  /83   Pulse (!) 116   Temp 97.9 °F (36.6 °C) (Oral)   Resp 17   Ht 1.753 m (5' 9\")   Wt 64 kg (141 lb 3.2 oz)   SpO2 98%   BMI 20.85 kg/m²     24HR INTAKE/OUTPUT:    Intake/Output Summary (Last 24 hours) at 1/4/2024 1113  Last data filed at 1/4/2024 0928  Gross per 24 hour   Intake 0 ml   Output 400 ml   Net -400 ml       CONSTITUTIONAL:  awake, alert, cooperative, no apparent distress, and appears stated age  LUNGS:  Moving air better.  CARDIOVASCULAR:  normal S1 and S2 and negative JVD  ABD:Abdomen soft, non-tender. BS normal. No masses,  
Pulmonary and Critical Care  Progress Note    Subjective:   The patient is better.On 3 L N/C.  Shortness of breath has improved  Chest pain none.  Addressing respiratory complaints Patient is negative for  hemoptysis and cyanosis  CONSTITUTIONAL:  negative for fevers and chills      Past Medical History:     has a past medical history of Alcohol abuse, Atrial flutter (HCC), COPD (chronic obstructive pulmonary disease) (HCC), Diabetes mellitus (HCC), H/O 24 hour EKG monitoring, H/O cardiovascular stress test, H/O echocardiogram, History of cardiac monitoring, History of cardioversion, Hx of echocardiogram, Hyperlipidemia, Hypertension, and Obesity.   has a past surgical history that includes Atrial ablation surgery (10/2006); Atrial ablation surgery (10/06/2006); Infected skin debridement (03/2012); Colonoscopy (03/03/2015); hernia repair; and CT NEEDLE BIOPSY LUNG PERCUTANEOUS (11/29/2023).   reports that he quit smoking about 21 years ago. His smoking use included cigarettes. He started smoking about 61 years ago. He has a 20.0 pack-year smoking history. He has never used smokeless tobacco. He reports that he does not currently use alcohol. He reports that he does not use drugs.  Family history:  family history includes High Blood Pressure in his brother.    No Known Allergies  Social History:    Reviewed; no changes    Objective:   PHYSICAL EXAM:        VITALS:  /79   Pulse (!) 118   Temp 96.8 °F (36 °C) (Axillary)   Resp 16   Ht 1.753 m (5' 9\")   Wt 64.8 kg (142 lb 12.8 oz)   SpO2 98%   BMI 21.09 kg/m²     24HR INTAKE/OUTPUT:    Intake/Output Summary (Last 24 hours) at 1/5/2024 1238  Last data filed at 1/5/2024 0400  Gross per 24 hour   Intake --   Output 300 ml   Net -300 ml       CONSTITUTIONAL:  awake, alert, cooperative, no apparent distress, and appears stated age  LUNGS:  Decreased BS, occ basilar crackles.  CARDIOVASCULAR:  normal S1 and S2 and negative JVD  ABD:Abdomen soft, non-tender. BS 
Pulmonary and Critical Care  Progress Note    Subjective:   The patient is comfortable in bed.On 3 L N/C.  Shortness of breath better.  Chest pain none.  Addressing respiratory complaints Patient is negative for  hemoptysis and cyanosis  CONSTITUTIONAL:  negative for fevers and chills      Past Medical History:     has a past medical history of Alcohol abuse, Atrial flutter (HCC), COPD (chronic obstructive pulmonary disease) (HCC), Diabetes mellitus (HCC), H/O 24 hour EKG monitoring, H/O cardiovascular stress test, H/O echocardiogram, History of cardiac monitoring, History of cardioversion, Hx of echocardiogram, Hyperlipidemia, Hypertension, and Obesity.   has a past surgical history that includes Atrial ablation surgery (10/2006); Atrial ablation surgery (10/06/2006); Infected skin debridement (03/2012); Colonoscopy (03/03/2015); hernia repair; and CT NEEDLE BIOPSY LUNG PERCUTANEOUS (11/29/2023).   reports that he quit smoking about 21 years ago. His smoking use included cigarettes. He started smoking about 61 years ago. He has a 20.0 pack-year smoking history. He has never used smokeless tobacco. He reports that he does not currently use alcohol. He reports that he does not use drugs.  Family history:  family history includes High Blood Pressure in his brother.    No Known Allergies  Social History:    Reviewed; no changes    Objective:   PHYSICAL EXAM:        VITALS:  BP (!) 165/87   Pulse 93   Temp 97.9 °F (36.6 °C) (Oral)   Resp 24   Ht 1.753 m (5' 9\")   Wt 66.1 kg (145 lb 12.8 oz)   SpO2 93%   BMI 21.53 kg/m²     24HR INTAKE/OUTPUT:    Intake/Output Summary (Last 24 hours) at 1/7/2024 1128  Last data filed at 1/7/2024 0931  Gross per 24 hour   Intake 265 ml   Output 435 ml   Net -170 ml       CONSTITUTIONAL:  awake, alert, cooperative, no apparent distress, and appears stated age  LUNGS:  Decreased BS, occ basilar crackles.  CARDIOVASCULAR:  normal S1 and S2 and negative JVD  ABD:Abdomen soft, 
Pulmonary and Critical Care  Progress Note    Subjective:   The patient is having PEG today.On 3 L N/C.  Shortness of breath has improved  Chest pain none.  Addressing respiratory complaints Patient is negative for  hemoptysis and cyanosis  CONSTITUTIONAL:  negative for fevers and chills      Past Medical History:     has a past medical history of Alcohol abuse, Atrial flutter (HCC), COPD (chronic obstructive pulmonary disease) (HCC), Diabetes mellitus (HCC), H/O 24 hour EKG monitoring, H/O cardiovascular stress test, H/O echocardiogram, History of cardiac monitoring, History of cardioversion, Hx of echocardiogram, Hyperlipidemia, Hypertension, and Obesity.   has a past surgical history that includes Atrial ablation surgery (10/2006); Atrial ablation surgery (10/06/2006); Infected skin debridement (03/2012); Colonoscopy (03/03/2015); hernia repair; and CT NEEDLE BIOPSY LUNG PERCUTANEOUS (11/29/2023).   reports that he quit smoking about 21 years ago. His smoking use included cigarettes. He started smoking about 61 years ago. He has a 20.0 pack-year smoking history. He has never used smokeless tobacco. He reports that he does not currently use alcohol. He reports that he does not use drugs.  Family history:  family history includes High Blood Pressure in his brother.    No Known Allergies  Social History:    Reviewed; no changes    Objective:   PHYSICAL EXAM:        VITALS:  BP (!) 161/88   Pulse (!) 103   Temp 97.1 °F (36.2 °C) (Oral)   Resp 18   Ht 1.753 m (5' 9\")   Wt 64.9 kg (143 lb)   SpO2 97%   BMI 21.12 kg/m²     24HR INTAKE/OUTPUT:    Intake/Output Summary (Last 24 hours) at 1/6/2024 1118  Last data filed at 1/6/2024 1049  Gross per 24 hour   Intake 300 ml   Output 678 ml   Net -378 ml       CONSTITUTIONAL:  awake, alert, cooperative, no apparent distress, and appears stated age  LUNGS:  Decreased BS, occ basilar crackles.  CARDIOVASCULAR:  normal S1 and S2 and negative JVD  ABD:Abdomen soft, 
Pulmonary and Critical Care  Progress Note    Subjective:   The patient is unchanged.  Shortness of breath unchanged.  Chest pain none.  Addressing respiratory complaints Patient is negative for  hemoptysis and cyanosis  CONSTITUTIONAL:  negative for fevers and chills      Past Medical History:     has a past medical history of Alcohol abuse, Atrial flutter (HCC), COPD (chronic obstructive pulmonary disease) (HCC), Diabetes mellitus (HCC), DVT of axillary vein, acute (HCC), H/O 24 hour EKG monitoring, H/O cardiovascular stress test, H/O echocardiogram, History of cardiac monitoring, History of cardioversion, Hx of echocardiogram, Hyperlipidemia, Hypertension, and Obesity.   has a past surgical history that includes Atrial ablation surgery (10/2006); Atrial ablation surgery (10/06/2006); Infected skin debridement (03/2012); Colonoscopy (03/03/2015); hernia repair; CT NEEDLE BIOPSY LUNG PERCUTANEOUS (11/29/2023); and Gastrostomy tube placement (N/A, 1/6/2024).   reports that he quit smoking about 21 years ago. His smoking use included cigarettes. He started smoking about 61 years ago. He has a 20.0 pack-year smoking history. He has never used smokeless tobacco. He reports that he does not currently use alcohol. He reports that he does not use drugs.  Family history:  family history includes High Blood Pressure in his brother.    No Known Allergies  Social History:    Reviewed; no changes    Objective:   PHYSICAL EXAM:        VITALS:  /73   Pulse (!) 108   Temp 97.5 °F (36.4 °C) (Oral)   Resp 14   Ht 1.753 m (5' 9\")   Wt 64 kg (141 lb)   SpO2 98%   BMI 20.82 kg/m²     24HR INTAKE/OUTPUT:    Intake/Output Summary (Last 24 hours) at 1/16/2024 1043  Last data filed at 1/16/2024 0850  Gross per 24 hour   Intake 40 ml   Output 1300 ml   Net -1260 ml       CONSTITUTIONAL:  Somnolent.  LUNGS:  Decreased BS, occ basilar crackles.  CARDIOVASCULAR:  normal S1 and S2 and negative JVD  ABD:Abdomen soft, non-tender. BS 
RR-RN asked to evaluate patient after Afib episode. Upon arrival patient sleeping soundly with HR 90's in NSR. No afib noted currently. Review of telemetry alarm and 12lead EKG showed Afib that since resolved. Patient with beta blocker and calcium channel blocker both due within the next few hours. RN advised to medicate the patient as ordered and monitor telemetry for afib conversion. Please don't hesitate to call or utilize [email or web address] messaging for Rapid Resource RN should any further needs or concerns arise.      Bradly Rodriguez   Rapid Resouce RN   [facility] [phone]    
RRT re-checked on pt.    Pt's SaO2 is 85% on 8lpm HFNC    Pt is sleeping.    Pt is tolerating well.    
RRT received a call from the nurse who had [address].    Nurse let me know she put the pt up to 9lpm HFNC, but his SaO2 is still in the 70's    RRT came to the room, the pt's SaO2 is still in the 70%'s.    RRT brought a 100% NRB mask @ 15 lpm for the pt.    Pt's SaO2 has gradyally increased to 89% on the 100% NRB mask.    Pt is tolerating at the moment.      
Reached out to Rasta Douglas due to pts residual being at 280. Per secured message will hold feed for 2hrs and then recheck. Currently tube feed is at the initial rate of 20ml/hr. Unable to advance at this time. Also, reached out in regards to pt worrying about left arm swelling. Doppler ordered. Currently have the arm elevated on two pillows.  
Records do not show that Mateo Street has had a Pulmonary Function Test (PFT).  PFTs are required for confirmation of diagnosis and GOLD grading used for pharmacological and nonpharmacological therapy recommendations.  Please schedule Mateo Street for PFTs once stable.  
Residual rechecked and is now at 200. Color dark green. Per Rasta Douglas will keep tube feed off at this time and wait for GI recommendation when they round in the morning.   
Residual still 210ml. 150ml put back in and afternoon medications administered. Orders per GI for IV reglan Q8h and to restart tube feed at initial rate of 20ml/hr. Will advance as tolerated per order.  
Resource RN called to bedside for no IV access. IV access gained to right cephalic with 20 gauge, blood return noted, and no difficulty flushing.  
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      Impressions: Mateo Street was seen for a bedside swallowing evaluation after being admitted to [facility] with SOB.  Pt was alert and cooperative throughout the assessment.  Relevant medical hx includes metastatic non-small cell carcinoma, esophageal cancer, COPD and hypertension.  Pt reports he has only been able to swallow liquids for approximately a month d/t feeling as if food and liquids are getting stuck in his esophagus and managing thick mucous in his throat.   For today's assessment pt was positioned upright in bed and presented with a weak vocal quality and strong congested cough.  Oral mechanism examination was WFL with no focal orofacial weakness.  SLP assisted pt with oral suctioning prior to PO trials being given to remove thick secretions from oral cavity.  PO trials of nectar thick liquids, ice chips and thin liquids by spoon/cup were given.  Adequate oral manipulation and clearance was observed with all liquid trials given.  Pharyngeal swallow appeared timely with reduced laryngeal elevation.  Pt demonstrated a delayed congested cough following trials of thin liquids by cup sips.  He reports feeling as if liquids are regurgitating from esophagus- question esophageal 
This nurse entered room to patient stating that he could not breathe. This nurse noticed that his o2 was not on. Put o2 back on and gave patient some time to recover. Put Patient on high flow cannula and increased o2 to 12L and patient is still satting 85%. Respiratory called. Patient states that he soes not feel well enough to go to radiation today. Called Dr Sprague and left message. Radiation nurse called and asked how patient was doing and ultimately decided per what patient says and increase o2 demand that radiation was going to be cancelled for today and still plan on tomorrow if patient improves.          Demetrice Hwang RN   
This nurse reached out to provider regarding HR in the 30's. This nurse held the metoprolol with morning medications due to low HR. Provider consulted cardiology.     Demetrice Hwang RN   
This nurse reached out to provider regarding pain medication for the patient because he is in 9/10 pain. Provider stated that it was ok to give dilaudid. This nurse was also instructed to remove tele but to continue to monitor o2 saturation. Tele was removed. Dilaudid given per order. Patient os on 6L NC and satting 94% at this time.     Demetrice Hwang RN   
This nurse reached out to provider regarding patients nausea and gastric residual. Provider instructed this nurse not to check the residual and to give zofran for nausea.     Demetrice Hwang RN   
Transport set up for cancer center chair time of 1400 on Monday via [place]. Transport to arrive a little before chair time.  
Transport set up thru [facility] for radiation treatment today and Monday (1/8). Appts at 1400  
Tube feed due to be advanced to goal at 9:30am. 250ml residual noted at that time. 120ml put back in and morning medications administered as well. ARTUR Mckay notified. Ordered to monitor residual and will restart tube feed as able.  
Tube feed held at this time per Dr. Bernal.     Demetrice Hwang RN   
Unable to eat  Abdomen soft  Will try NG tube in the meantime  If not possible will plan for EGD with PEG    I have seen and examined this patient personally, and independently of Wendy Jernigan    The plan was developed mutually at the time of the visit with the patient.  Wendy and myself have spoken with patient, nursing staff and provided written and verbal instructions .    The above note has been reviewed and I agree with the Assessment,  Diagnosis, and Treatment plan as suggested by WENDY JERNIGAN.  I have also suggested more changes to the therapy plan , which is being implemented.    I have also put in a separate note indicating my examination findings and recommendations      Ventura Qureshi MD  [facility]    Please note time of note may not reflect time of encounter       [facility]    [facility]  Progress Note  2024  9:05 AM  ___________________________________________________________________________  Patient:    Mateo Street  : 1945   78 y.o.             MRN: [id number]  Admitted: 2024  3:38 PM ATT: Mitch Somers MD   3108/3108-A  AdmitDx: COPD exacerbation (HCC) [J44.1]  History of cancer [Z85.9]  SIRS (systemic inflammatory response syndrome) (HCC) [R65.10]  Troponin level elevated [R79.89]  Sepsis (HCC) [A41.9]  Other fatigue [R53.83]  Dyspnea, unspecified type [R06.00]  PCP: Salvador Riley MD  ___________________________________________________________________________  ASSESSMENT AND PLAN :    Impression:   Dysphagia  Metastatic lung cancer  COPD  Atrial flutter, Xarelto  DM    Discussion:  78 y.o. 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 May need further evaluation of esophagus and another nutrition route. He 
Unable to pass NG tube  Not able to eat well  Will plan for EGD PEG tube a.m.  Continue antibiotics  INR stable  Abdomen soft  Patient agrees    I have seen and examined this patient personally, and independently of Wendy Jernigan    The plan was developed mutually at the time of the visit with the patient.  Wendy and myself have spoken with patient, nursing staff and provided written and verbal instructions .    The above note has been reviewed and I agree with the Assessment,  Diagnosis, and Treatment plan as suggested by WENDY JERNIGAN.  I have also suggested more changes to the therapy plan , which is being implemented.    I have also put in a separate note indicating my examination findings and recommendations      Ventura Qureshi MD  [facility]    Please note time of note may not reflect time of encounter       [facility]    [facility]  Progress Note  2024  10:14 AM  ___________________________________________________________________________  Patient:    Mateo Street  : 1945   78 y.o.             MRN: [id number]  Admitted: 2024  3:38 PM ATT: Mitch Somers MD   3108/3108-A  AdmitDx: COPD exacerbation (HCC) [J44.1]  History of cancer [Z85.9]  SIRS (systemic inflammatory response syndrome) (HCC) [R65.10]  Troponin level elevated [R79.89]  Sepsis (HCC) [A41.9]  Other fatigue [R53.83]  Dyspnea, unspecified type [R06.00]  PCP: Salvador Riley MD  ___________________________________________________________________________  ASSESSMENT AND PLAN :    Impression:   Dysphagia  Metastatic lung cancer  COPD  Atrial flutter, Xarelto  DM    Discussion:  78 y.o. 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 May need further evaluation of esophagus 
VIVIANE notified for pt continued agitation, climbing out of bed, pulling at things, working harder to breath.  RN ALVERTO Juárez spoke with pt son who is on his way to the hospital and stated, \"do what we have to, to make him comfortable.\"      Vitals:    01/15/24 0109   BP: 113/67   Pulse: (!) 118   Resp: 30   Temp:    SpO2: (!) 89%     Will change IV Ativan 0.5 MG Q6hrs to Q3hrs; pt is DNR-CC pending hospice at home later today.  Updated RN to monitor response.    
VIVIANE notified of pt c/o of anxiety; VSS: 97.8, HR 76, R 18, 130/69(84), 92% SpO2 6 LPM. EMR reviewed. 0.5 MG IV Ativan x1 dose ordered. CALLIE Diaz updated to monitor response.    
pulmonary      SUBJECTIVE:  c/o headache,breathing is stable     OBJECTIVE    VITALS:  /71   Pulse 74   Temp 98 °F (36.7 °C) (Oral)   Resp 20   Ht 1.753 m (5' 9\")   Wt 64.5 kg (142 lb 3.2 oz)   SpO2 93%   BMI 21.00 kg/m²   HEAD AND FACE EXAM:  No throat injection, no active exudate,no thrush  NECK EXAM;No JVD, no masses, symmetrical  CHEST EXAM; Expansion equal and symmetrical, no masses  LUNG EXAM; Good breath sounds bilaterally. There are expiratory wheezes both lungs, there are crackles at both lung bases  CARDIOVASCULAR EXAM: Positive S1 and S2, no S3 or S4, no clicks ,no murmurs  RIGHT AND LEFT LOWER EXTRIMITY EXAM: No edema, no swelling, no inflamation  CNS EXAM: Alert and oriented X3          LABS   Lab Results   Component Value Date    WBC 18.6 (H) 01/10/2024    HGB 11.7 (L) 01/10/2024    HCT 37.4 (L) 01/10/2024    .5 (H) 01/10/2024     01/10/2024     Lab Results   Component Value Date    CREATININE 0.7 (L) 01/10/2024    BUN 32 (H) 01/10/2024     (L) 01/10/2024    K 5.1 01/10/2024    CL 97 (L) 01/10/2024    CO2 29 01/10/2024     Lab Results   Component Value Date    INR 1.2 01/04/2024    PROTIME 15.5 (H) 01/04/2024          Lab Results   Component Value Date/Time    PHOS 2.5 01/10/2024 04:59 AM    PHOS 2.7 01/09/2024 12:17 AM    PHOS 2.5 01/08/2024 09:12 AM      No results for input(s): \"PH\", \"PO2ART\", \"[id number]\", \"HCO3\", \"BEART\", \"O2SAT\" in the last 72 hours.      Wt Readings from Last 3 Encounters:   01/10/24 64.5 kg (142 lb 3.2 oz)   12/30/23 72.6 kg (160 lb)   12/21/23 72.3 kg (159 lb 6.4 oz)               ASSESMENT  Ac resp failure  Ac copd  Asp pneumonia        PLAN  On tapering steroids  Bd rx  O2 adm    1/10/2024  Manny Tate MD, M.D.   
pulmonary      SUBJECTIVE:  no change     OBJECTIVE    VITALS:  /79   Pulse (!) 115   Temp (!) 96 °F (35.6 °C) (Axillary)   Resp 28   Ht 1.753 m (5' 9\")   Wt 65.1 kg (143 lb 9.6 oz)   SpO2 98%   BMI 21.21 kg/m²   HEAD AND FACE EXAM:  No throat injection, no active exudate,no thrush  NECK EXAM;No JVD, no masses, symmetrical  CHEST EXAM; Expansion equal and symmetrical, no masses  LUNG EXAM; Good breath sounds bilaterally. There are expiratory wheezes both lungs, there are crackles at both lung bases  CARDIOVASCULAR EXAM: Positive S1 and S2, no S3 or S4, no clicks ,no murmurs  RIGHT AND LEFT LOWER EXTRIMITY EXAM: No edema, no swelling, no inflamation  CNS EXAM: Alert and oriented X3          LABS   Lab Results   Component Value Date    WBC 18.6 (H) 01/10/2024    HGB 11.7 (L) 01/10/2024    HCT 37.4 (L) 01/10/2024    .5 (H) 01/10/2024     01/10/2024     Lab Results   Component Value Date    CREATININE 0.7 (L) 01/10/2024    BUN 32 (H) 01/10/2024     (L) 01/10/2024    K 5.1 01/10/2024    CL 97 (L) 01/10/2024    CO2 29 01/10/2024     Lab Results   Component Value Date    INR 1.2 01/04/2024    PROTIME 15.5 (H) 01/04/2024          Lab Results   Component Value Date/Time    PHOS 2.5 01/10/2024 04:59 AM    PHOS 2.7 01/09/2024 12:17 AM    PHOS 2.5 01/08/2024 09:12 AM      No results for input(s): \"PH\", \"PO2ART\", \"TOR4BBG\", \"HCO3\", \"BEART\", \"O2SAT\" in the last 72 hours.      Wt Readings from Last 3 Encounters:   01/14/24 65.1 kg (143 lb 9.6 oz)   12/30/23 72.6 kg (160 lb)   12/21/23 72.3 kg (159 lb 6.4 oz)               ASSESMENT  Ac resp failure  Ac copd  Pneumonia  Metastatic sq cell ca of lung        PLAN  Cpm  Hospice eval in progress as per pt wishes    1/15/2024  Manny Tate MD, MRitaD.   
pulmonary      SUBJECTIVE:  off vapotherm and on 4 litres o2     OBJECTIVE    VITALS:  /65   Pulse 81   Temp 97.9 °F (36.6 °C) (Oral)   Resp 18   Ht 1.753 m (5' 9\")   Wt 64 kg (141 lb)   SpO2 92%   BMI 20.82 kg/m²   HEAD AND FACE EXAM:  No throat injection, no active exudate,no thrush  NECK EXAM;No JVD, no masses, symmetrical  CHEST EXAM; Expansion equal and symmetrical, no masses  LUNG EXAM; Good breath sounds bilaterally. There are expiratory wheezes both lungs, there are crackles at both lung bases  CARDIOVASCULAR EXAM: Positive S1 and S2, no S3 or S4, no clicks ,no murmurs  RIGHT AND LEFT LOWER EXTRIMITY EXAM: No edema, no swelling, no           LABS   Lab Results   Component Value Date    WBC 18.6 (H) 01/10/2024    HGB 11.7 (L) 01/10/2024    HCT 37.4 (L) 01/10/2024    .5 (H) 01/10/2024     01/10/2024     Lab Results   Component Value Date    CREATININE 0.7 (L) 01/10/2024    BUN 32 (H) 01/10/2024     (L) 01/10/2024    K 5.1 01/10/2024    CL 97 (L) 01/10/2024    CO2 29 01/10/2024     Lab Results   Component Value Date    INR 1.2 01/04/2024    PROTIME 15.5 (H) 01/04/2024          Lab Results   Component Value Date/Time    PHOS 2.5 01/10/2024 04:59 AM    PHOS 2.7 01/09/2024 12:17 AM    PHOS 2.5 01/08/2024 09:12 AM      No results for input(s): \"PH\", \"PO2ART\", \"CQY2PVC\", \"HCO3\", \"BEART\", \"O2SAT\" in the last 72 hours.      Wt Readings from Last 3 Encounters:   01/11/24 64 kg (141 lb)   12/30/23 72.6 kg (160 lb)   12/21/23 72.3 kg (159 lb 6.4 oz)               ASSESMENT  Ac resp failure  Ac copd  Pneumonia  Lung ca        PLAN  O2 adm  Bd rx  antibx    1/13/2024  Manny Tate MD, M.D.  
pulmonary      SUBJECTIVE:  on 4 litres o2 and no sob but bedbound     OBJECTIVE    VITALS:  /82   Pulse 77   Temp 97.9 °F (36.6 °C) (Oral)   Resp 15   Ht 1.753 m (5' 9\")   Wt 64 kg (141 lb)   SpO2 96%   BMI 20.82 kg/m²   HEAD AND FACE EXAM:  No throat injection, no active exudate,no thrush  NECK EXAM;No JVD, no masses, symmetrical  CHEST EXAM; Expansion equal and symmetrical, no masses  LUNG EXAM; Good breath sounds bilaterally. There are expiratory wheezes both lungs, there are crackles at both lung bases  CARDIOVASCULAR EXAM: Positive S1 and S2, no S3 or S4, no clicks ,no murmurs  RIGHT AND LEFT LOWER EXTRIMITY EXAM: No edema, no swelling, no           LABS   Lab Results   Component Value Date    WBC 18.6 (H) 01/10/2024    HGB 11.7 (L) 01/10/2024    HCT 37.4 (L) 01/10/2024    .5 (H) 01/10/2024     01/10/2024     Lab Results   Component Value Date    CREATININE 0.7 (L) 01/10/2024    BUN 32 (H) 01/10/2024     (L) 01/10/2024    K 5.1 01/10/2024    CL 97 (L) 01/10/2024    CO2 29 01/10/2024     Lab Results   Component Value Date    INR 1.2 01/04/2024    PROTIME 15.5 (H) 01/04/2024          Lab Results   Component Value Date/Time    PHOS 2.5 01/10/2024 04:59 AM    PHOS 2.7 01/09/2024 12:17 AM    PHOS 2.5 01/08/2024 09:12 AM      No results for input(s): \"PH\", \"PO2ART\", \"[id number]\", \"HCO3\", \"BEART\", \"O2SAT\" in the last 72 hours.      Wt Readings from Last 3 Encounters:   01/11/24 64 kg (141 lb)   12/30/23 72.6 kg (160 lb)   12/21/23 72.3 kg (159 lb 6.4 oz)               ASSESMENT  Ac resp failure  Ac copd  Pneumonia  Lung ca metastatic        PLAN  Bd rx  O2 adm  Flagyl as per ID    1/11/2024  Manny Tate MD, M.D.  
pulmonary      SUBJECTIVE:  on vapotherm and fio2 90%     OBJECTIVE    VITALS:  /74   Pulse 94   Temp 97.8 °F (36.6 °C) (Oral)   Resp 16   Ht 1.753 m (5' 9\")   Wt 64 kg (141 lb)   SpO2 95%   BMI 20.82 kg/m²   HEAD AND FACE EXAM:  No throat injection, no active exudate,no thrush  NECK EXAM;No JVD, no masses, symmetrical  CHEST EXAM; Expansion equal and symmetrical, no masses  LUNG EXAM; Good breath sounds bilaterally. There are expiratory wheezes both lungs, there are crackles at both lung bases  CARDIOVASCULAR EXAM: Positive S1 and S2, no S3 or S4, no clicks ,no murmurs  RIGHT AND LEFT LOWER EXTRIMITY EXAM: No edema, no swelling,           LABS   Lab Results   Component Value Date    WBC 18.6 (H) 01/10/2024    HGB 11.7 (L) 01/10/2024    HCT 37.4 (L) 01/10/2024    .5 (H) 01/10/2024     01/10/2024     Lab Results   Component Value Date    CREATININE 0.7 (L) 01/10/2024    BUN 32 (H) 01/10/2024     (L) 01/10/2024    K 5.1 01/10/2024    CL 97 (L) 01/10/2024    CO2 29 01/10/2024     Lab Results   Component Value Date    INR 1.2 01/04/2024    PROTIME 15.5 (H) 01/04/2024          Lab Results   Component Value Date/Time    PHOS 2.5 01/10/2024 04:59 AM    PHOS 2.7 01/09/2024 12:17 AM    PHOS 2.5 01/08/2024 09:12 AM      No results for input(s): \"PH\", \"PO2ART\", \"[id number]\", \"HCO3\", \"BEART\", \"O2SAT\" in the last 72 hours.      Wt Readings from Last 3 Encounters:   01/11/24 64 kg (141 lb)   12/30/23 72.6 kg (160 lb)   12/21/23 72.3 kg (159 lb 6.4 oz)     Narrative & Impression  EXAMINATION:  ONE XRAY VIEW OF THE CHEST     1/11/2024 1:00 pm     COMPARISON:  01/07/2024     HISTORY:  ORDERING SYSTEM PROVIDED HISTORY: hypoxia  TECHNOLOGIST PROVIDED HISTORY:  Reason for exam:->hypoxia  Reason for Exam: hypoxia  Additional signs and symptoms: hypoxia  Relevant Medical/Surgical History: hypoxia     FINDINGS:  The lungs are without acute focal process.  Stable bilateral nodular  opacities since 10/11/2023.  
pulmonary      SUBJECTIVE:  remains weak and on o2 at 4-5 litres     OBJECTIVE    VITALS:  /76   Pulse 88   Temp 97 °F (36.1 °C) (Oral)   Resp 20   Ht 1.753 m (5' 9\")   Wt 67.4 kg (148 lb 9.6 oz)   SpO2 (!) 87%   BMI 21.94 kg/m²   HEAD AND FACE EXAM:  No throat injection, no active exudate,no thrush  NECK EXAM;No JVD, no masses, symmetrical  CHEST EXAM; Expansion equal and symmetrical, no masses  LUNG EXAM; Good breath sounds bilaterally. There are expiratory wheezes both lungs, there are crackles at both lung bases  CARDIOVASCULAR EXAM: Positive S1 and S2, no S3 or S4, no clicks ,no murmurs  RIGHT AND LEFT LOWER EXTRIMITY EXAM: No edema, no swelling, no inflamation            LABS   Lab Results   Component Value Date    WBC 18.6 (H) 01/10/2024    HGB 11.7 (L) 01/10/2024    HCT 37.4 (L) 01/10/2024    .5 (H) 01/10/2024     01/10/2024     Lab Results   Component Value Date    CREATININE 0.7 (L) 01/10/2024    BUN 32 (H) 01/10/2024     (L) 01/10/2024    K 5.1 01/10/2024    CL 97 (L) 01/10/2024    CO2 29 01/10/2024     Lab Results   Component Value Date    INR 1.2 01/04/2024    PROTIME 15.5 (H) 01/04/2024          Lab Results   Component Value Date/Time    PHOS 2.5 01/10/2024 04:59 AM    PHOS 2.7 01/09/2024 12:17 AM    PHOS 2.5 01/08/2024 09:12 AM      No results for input(s): \"PH\", \"PO2ART\", \"[id number]\", \"HCO3\", \"BEART\", \"O2SAT\" in the last 72 hours.      Wt Readings from Last 3 Encounters:   01/14/24 67.4 kg (148 lb 9.6 oz)   12/30/23 72.6 kg (160 lb)   12/21/23 72.3 kg (159 lb 6.4 oz)               ASSESMENT  Ac resp failure  Ac copd  Asp pneumonia        PLAN  Bd rx  Finished antibx  Cont to try to wean o2 as tolerated    1/14/2024  Manny Tate MD, M.D.  
pulmonary      SUBJECTIVE:  seen early am and sleeping     OBJECTIVE    VITALS:  /63   Pulse 75   Temp 98 °F (36.7 °C) (Oral)   Resp 12   Ht 1.753 m (5' 9\")   Wt 65.2 kg (143 lb 11.2 oz)   SpO2 94%   BMI 21.22 kg/m²   HEAD AND FACE EXAM:  No throat injection, no active exudate,no thrush  NECK EXAM;No JVD, no masses, symmetrical  CHEST EXAM; Expansion equal and symmetrical, no masses  LUNG EXAM; Good breath sounds bilaterally. There are expiratory wheezes both lungs, there are crackles at both lung bases  CARDIOVASCULAR EXAM: Positive S1 and S2, no S3 or S4, no clicks ,no murmurs  RIGHT AND LEFT LOWER EXTRIMITY EXAM: No edema, no swelling, no inflamation            LABS   Lab Results   Component Value Date    WBC 19.5 (H) 01/09/2024    HGB 12.0 (L) 01/09/2024    HCT 38.6 (L) 01/09/2024    .0 (H) 01/09/2024     01/09/2024     Lab Results   Component Value Date    CREATININE 0.8 (L) 01/09/2024    BUN 30 (H) 01/09/2024     01/09/2024    K 4.4 01/09/2024     01/09/2024    CO2 26 01/09/2024     Lab Results   Component Value Date    INR 1.2 01/04/2024    PROTIME 15.5 (H) 01/04/2024          Lab Results   Component Value Date/Time    PHOS 2.7 01/09/2024 12:17 AM    PHOS 2.5 01/08/2024 09:12 AM    PHOS 2.2 01/07/2024 11:19 AM      No results for input(s): \"PH\", \"PO2ART\", \"UEV1TIG\", \"HCO3\", \"BEART\", \"O2SAT\" in the last 72 hours.      Wt Readings from Last 3 Encounters:   01/08/24 65.2 kg (143 lb 11.2 oz)   12/30/23 72.6 kg (160 lb)   12/21/23 72.3 kg (159 lb 6.4 oz)               ASSESMENT  Ac resp failure  Ac copd  pneumonia        PLAN  Bd rx  Taper steroids  Antibx  Cont to wean o2 as tolerated    1/9/2024  Manny Tate MD, M.D.   
pulmonary      SUBJECTIVE:  sleeping and no sob     OBJECTIVE    VITALS:  BP (!) 172/90   Pulse 87   Temp 97 °F (36.1 °C) (Oral)   Resp 18   Ht 1.753 m (5' 9\")   Wt 66.1 kg (145 lb 12.8 oz)   SpO2 93%   BMI 21.53 kg/m²   HEAD AND FACE EXAM:  No throat injection, no active exudate,no thrush  NECK EXAM;No JVD, no masses, symmetrical  CHEST EXAM; Expansion equal and symmetrical, no masses  LUNG EXAM; Good breath sounds bilaterally. There are expiratory wheezes both lungs, there are crackles at both lung bases  CARDIOVASCULAR EXAM: Positive S1 and S2, no S3 or S4, no clicks ,no murmurs  RIGHT AND LEFT LOWER EXTRIMITY EXAM: No edema, no swelling, no inflamation  CNS EXAM: Alert and oriented X3          LABS   Lab Results   Component Value Date    WBC 15.6 (H) 01/07/2024    HGB 12.2 (L) 01/07/2024    HCT 38.7 (L) 01/07/2024    .5 (H) 01/07/2024     01/07/2024     Lab Results   Component Value Date    CREATININE 0.6 (L) 01/07/2024    BUN 29 (H) 01/07/2024     01/07/2024    K 4.5 01/07/2024     01/07/2024    CO2 22 01/07/2024     Lab Results   Component Value Date    INR 1.2 01/04/2024    PROTIME 15.5 (H) 01/04/2024          Lab Results   Component Value Date/Time    PHOS 2.2 01/07/2024 11:19 AM    PHOS 2.5 01/06/2024 05:36 AM    PHOS 2.4 01/05/2024 10:33 AM      No results for input(s): \"PH\", \"PO2ART\", \"NHY2RZC\", \"HCO3\", \"BEART\", \"O2SAT\" in the last 72 hours.      Wt Readings from Last 3 Encounters:   01/07/24 66.1 kg (145 lb 12.8 oz)   12/30/23 72.6 kg (160 lb)   12/21/23 72.3 kg (159 lb 6.4 oz)               ASSESMENT  Ac resp failure  Ac copd  Asp pneumonia        PLAN  On o2 at 3 litres  Bd rx  Antibx  Steroids can be weaned starting tomorrow    1/8/2024  Manny Tate MD, M.D.  
  1/10/2024, 10:26 AM        
  Patient's tolerance of treatment:  Good   Adverse Reaction: none  Significant change in status and impact:  none  Barriers to improvement:  none  Plan for Next Session:    Plan to continue ambulation.   Time in:  1029  Time out:  1058  Timed treatment minutes:  29  Total treatment time:  29    Previously filed items:  Social/Functional History  Lives With: Alone  Type of Home: House  Home Layout: One level, Laundry in basement  Home Access: Stairs to enter with rails  Entrance Stairs - Number of Steps: 3  Entrance Stairs - Rails: Both  Bathroom Shower/Tub: Tub/Shower unit  Bathroom Toilet: Standard  Bathroom Equipment: Shower chair  Bathroom Accessibility: Accessible  Home Equipment: Walker, 4 wheeled, Cane, Oxygen (3L o2)  Receives Help From: Family  ADL Assistance: Independent  Homemaking Assistance: Needs assistance (cleaning lady every 3 weeks)  Homemaking Responsibilities: Yes  Ambulation Assistance: Independent (mod I with 4ww)  Transfer Assistance: Independent  Active : Yes (son also drives pt PRN)  Patient's  Info: FAMILY PROVIDES HIS TRANSPORTATION  Mode of Transportation: Car  Occupation: Retired             Electronically signed by:    Dorie Urbina PTA  1/10/2024, 12:05 PM      
RVR:  Dr. Cabrera onboard  Multi-morbidity: per PMHx: non-small cell carcinoma, COPD oxygen dependent at 3 L/min/NC, Aflutter s/p atrial ablation 2006, alcohol abuse, DMII, HLD and HTN, anemia of chronic disease  Plan:  Continue IV fluconazole 400 mg q24h  Trend CRP and Pct,   Repeat BC q48h until NGTD at 48h  Await ordered repeat BC 1/10  Following, if repeat BC from 1/10 show NGTD, will evaluate for possible removal of fluconazole    Ongoing Antimicrobial Therapy  Fluconazole 1/10-?  Completed Antimicrobial Therapy  Vancomycin 1/2-4  Cefepime 1/2-?  History:?Interval history noted. Chief complaint: Candida albicans 1/4 in blood culture.   Denies n/v/d/f or untoward effects of antibiotics. States had no new overnight events, sitting up in the chair.   Physical Exam:  Vital Signs: BP (!) 133/97   Pulse 79   Temp 98.9 °F (37.2 °C) (Oral)   Resp 16   Ht 1.753 m (5' 9\")   Wt 64 kg (141 lb)   SpO2 96%   BMI 20.82 kg/m²     Gen: alert and oriented X3, no distress, up in the chair  HEMT: AT/NC Oropharynx pink, moist, and without lesions or exudates; dentition in good state of repair  Eyes: PERRLA, EOMI, conjunctiva pink, sclera anicteric.   Neck: Supple. Trachea midline. No LAD.  Chest: no distress and CTA. Good air movement. Oxygen per NC.  Heart: NSR and no MRG.   Abd: soft, non-distended, no tenderness, no hepatomegaly. Normoactive bowel sounds.  Ext: no clubbing, cyanosis, or edema  Neuro: Mental status intact. CN 2-12 intact and no focal sensory or motor deficits     Radiologic / Imaging / TESTING  1/2/2024 XR Chest Portable:  IMPRESSION:  Bilateral upper lobe nodular opacities again noted. No acute cardiopulmonary process.     1/3/2024 XR Chest Portable:  IMPRESSION:  Stable examination.  No acute cardiopulmonary abnormality.     1/7/2024 XR Chest Portable:  RECOMMENDATION:  Left lung base atelectasis or infiltrate.  Recommend follow-up imaging to confirm resolution.     1/8/2024 Vascular US Duplex Upper 
hx of metastatic lung cancer to paratrach area, was supposed to get palliative radiation has been having worsening dysphagia. May need further evaluation of esophagus and another nutrition route. He states trouble with solids and liquids, worsening.  SLP teresita done - thickened liquids, puree with meds     1/4/24 - Doing better, off bipap, on 2-3 L oxygen, Pt reports still trouble swallowing even thickened liquids. From radiation oncology - The goal is to help pain with the skull base tumor and possibly help with swallowing because of the mediastinal/primary lung tumor.  Pt still in agreement for feeding tube if needed  Hgb 11.4     1/5/24 - Doing better, denies chest pain, improved SOB. Pt reports still trouble swallowing even thickened liquids.  Unable to place NG tube yesterday, plan for EGD/PEG placement tomorrow.  INR - 1.2, Hgb 11.7     1/7/24 - Doing better, using G tube without difficulty     1/8/24 - Doing better, adjusted bumper to 5, pt started Reglan tid yesterday, will restart tube feedings at 20cc/hr and continue with RD recommendations    1/9/24 - Doing better, per RN restarted feed at 6am today, not sure why stopped overnight, no BM    1/10/24 - Continues to tolerate feeds. Had BM x2 yesterday. No new complaints/issues per patient and RN at bedside.     Plan:  - Continue present management   - Continue PPI, Reglan  - Dulcolax PRN  - Tube feeds as tolerated  - Will sign off, please call if needed        Patient clinical, biochemical, and radiological information discussed with Dr. Wong. He agrees with the assessment and plan above.    ___________________________________________________________________________    SUBJECTIVE:    Patient feeling okay.  No new complaints.  BM x2 yesterday. Thickened diet and G tube feedings     No new abd pain, nausea, vomiting, diarrhea, fevers, chills, weakness, fatigue.   No chest pain, shortness of breath, cough, dizziness.      Chart reviewed, events noted             
    Vitals:   Vitals:    01/13/24 1220   BP:    Pulse:    Resp: 20   Temp:    SpO2:        Physical Exam:     General: NAD  Eyes: EOMI  ENT: neck supple  Cardiovascular: Regular rate.  Respiratory: mild bilateral rales  Gastrointestinal: Soft, non tender  Genitourinary: no suprapubic tenderness  Musculoskeletal: No edema  Skin: warm, dry  Neuro: Alert.  Psych: Mood appropriate.     Medications:   Medications:    polyethylene glycol  17 g Oral BID    sennosides-docusate sodium  2 tablet Oral BID    methylPREDNISolone  20 mg IntraVENous Q12H    metoprolol tartrate  75 mg Oral BID    ipratropium 0.5 mg-albuterol 2.5 mg  1 Dose Inhalation BID RT    metoclopramide  10 mg IntraVENous Q8H    sodium chloride flush  5-40 mL IntraVENous 2 times per day    lansoprazole  30 mg Per G Tube BID AC    acetaminophen  1,000 mg Oral Once    atorvastatin  40 mg Oral Daily    busPIRone  30 mg Oral Daily    escitalopram  20 mg Oral Daily    rivaroxaban  20 mg Oral Daily    tiZANidine  2 mg Oral Nightly    sodium chloride flush  5-40 mL IntraVENous 2 times per day    acetylcysteine  600 mg Inhalation BID RT    budesonide-formoterol  2 puff Inhalation BID RT      Infusions:    sodium chloride      sodium chloride      dextrose       PRN Meds: HYDROmorphone, 0.25 mg, Q3H PRN   Or  HYDROmorphone, 0.5 mg, Q3H PRN  LORazepam, 1 mg, Q6H PRN  guaiFENesin, 100 mg, Q6H PRN  albuterol sulfate HFA, 2 puff, Q4H PRN  hydrOXYzine HCl, 10 mg, TID PRN  sodium chloride flush, 5-40 mL, PRN  sodium chloride, , PRN  ondansetron, 4 mg, Q8H PRN   Or  ondansetron, 4 mg, Q6H PRN  sodium chloride flush, 5-40 mL, PRN  sodium chloride, , PRN  ondansetron, 4 mg, Q8H PRN   Or  ondansetron, 4 mg, Q6H PRN  polyethylene glycol, 17 g, Daily PRN  acetaminophen, 650 mg, Q6H PRN   Or  acetaminophen, 650 mg, Q6H PRN  butalbital-acetaminophen-caffeine, 1 tablet, Q4H PRN  glucose, 4 tablet, PRN  dextrose bolus, 125 mL, PRN   Or  dextrose bolus, 250 mL, PRN  glucagon (rDNA), 1 
Cigarettes     Start date: 1962     Quit date: 2002     Years since quittin.5    Smokeless tobacco: Never   Substance Use Topics    Alcohol use: Not Currently    Drug use: No     Family History   Problem Relation Age of Onset    High Blood Pressure Brother         No Known Allergies    No current facility-administered medications on file prior to encounter.     Current Outpatient Medications on File Prior to Encounter   Medication Sig Dispense Refill    morphine sulfate 20 MG/ML concentrated oral solution Take 0.5 mLs by mouth every 4 hours as needed for Pain for up to 7 days. Max Daily Amount: 60 mg 21 mL 0    tiZANidine (ZANAFLEX) 2 MG tablet Take 1 tablet by mouth nightly 30 tablet 3    omeprazole (PRILOSEC) 20 MG delayed release capsule Take 1 capsule by mouth daily 30 capsule 3    butalbital-acetaminophen-caffeine (FIORICET, ESGIC) -40 MG per tablet Take 1 tablet by mouth every 4 hours as needed for Headaches 180 tablet 3    fluticasone-umeclidin-vilant (TRELEGY ELLIPTA) 100-62.5-25 MCG/INH AEPB Inhale 1 puff into the lungs daily      busPIRone (BUSPAR) 30 MG tablet Take 30 mg by mouth daily      rivaroxaban (XARELTO) 20 MG TABS tablet Take 1 tablet by mouth Daily. **Pt started on 4/15/14 w/samples.** 90 tablet 3    atorvastatin (LIPITOR) 40 MG tablet Take 1 tablet by mouth daily      dilTIAZem (CARDIZEM CD) 240 MG extended release capsule Take 1 capsule by mouth daily      escitalopram (LEXAPRO) 20 MG tablet Take 1 tablet by mouth daily      OXYGEN 2.5 L nightly.          Review of Systems:  As noted in HPI, 10 point ROS otherwise negative       Vital Signs: /88   Pulse (!) 118   Temp 97.4 °F (36.3 °C) (Oral)   Resp 16   Ht 1.753 m (5' 9\")   Wt 64.8 kg (142 lb 12.8 oz)   SpO2 98%   BMI 21.09 kg/m²      Physical Exam:  CONSTITUTIONAL: awake, alert, cooperative, no apparent distress, chronically ill appearing  EYES: EOM grossly intact, + conjunctival pallor, no scleral 
Q12H    sodium chloride flush  5-40 mL IntraVENous 2 times per day    dilTIAZem  60 mg PEG Tube 4 times per day    lansoprazole  30 mg Per G Tube BID AC    metoprolol tartrate  25 mg Oral 4x daily    acetaminophen  1,000 mg Oral Once    atorvastatin  40 mg Oral Daily    busPIRone  30 mg Oral Daily    escitalopram  20 mg Oral Daily    rivaroxaban  20 mg Oral Daily    tiZANidine  2 mg Oral Nightly    ipratropium 0.5 mg-albuterol 2.5 mg  1 Dose Inhalation Q4H RT    sodium chloride flush  5-40 mL IntraVENous 2 times per day    acetylcysteine  600 mg Inhalation BID RT    cefepime  2,000 mg IntraVENous Q8H    budesonide-formoterol  2 puff Inhalation BID RT      Infusions:    sodium chloride      sodium chloride      dextrose       PRN Meds: hydrOXYzine HCl, 10 mg, TID PRN  sodium chloride flush, 5-40 mL, PRN  sodium chloride, , PRN  ondansetron, 4 mg, Q8H PRN   Or  ondansetron, 4 mg, Q6H PRN  morphine 20MG/ML, 10 mg, Q4H PRN  sodium chloride flush, 5-40 mL, PRN  sodium chloride, , PRN  ondansetron, 4 mg, Q8H PRN   Or  ondansetron, 4 mg, Q6H PRN  polyethylene glycol, 17 g, Daily PRN  acetaminophen, 650 mg, Q6H PRN   Or  acetaminophen, 650 mg, Q6H PRN  butalbital-acetaminophen-caffeine, 1 tablet, Q4H PRN  glucose, 4 tablet, PRN  dextrose bolus, 125 mL, PRN   Or  dextrose bolus, 250 mL, PRN  glucagon (rDNA), 1 mg, PRN  dextrose, , Continuous PRN        Labs      Recent Results (from the past 24 hour(s))   POCT Glucose    Collection Time: 01/06/24  3:04 PM   Result Value Ref Range    POC Glucose 88 70 - 99 MG/DL   POCT Glucose    Collection Time: 01/06/24  6:59 PM   Result Value Ref Range    POC Glucose 87 70 - 99 MG/DL   POCT Glucose    Collection Time: 01/07/24 12:36 AM   Result Value Ref Range    POC Glucose 102 (H) 70 - 99 MG/DL   POCT Glucose    Collection Time: 01/07/24  5:04 AM   Result Value Ref Range    POC Glucose 117 (H) 70 - 99 MG/DL   POCT Glucose    Collection Time: 01/07/24  9:35 AM   Result Value Ref Range    
busPIRone  30 mg Oral Daily    escitalopram  20 mg Oral Daily    rivaroxaban  20 mg Oral Daily    tiZANidine  2 mg Oral Nightly    ipratropium 0.5 mg-albuterol 2.5 mg  1 Dose Inhalation Q4H RT    sodium chloride flush  5-40 mL IntraVENous 2 times per day    acetylcysteine  600 mg Inhalation BID RT    cefepime  2,000 mg IntraVENous Q8H    budesonide-formoterol  2 puff Inhalation BID RT      sodium chloride      sodium chloride      dextrose       LORazepam, hydrOXYzine HCl, sodium chloride flush, sodium chloride, ondansetron **OR** ondansetron, morphine 20MG/ML, sodium chloride flush, sodium chloride, ondansetron **OR** ondansetron, polyethylene glycol, acetaminophen **OR** acetaminophen, butalbital-acetaminophen-caffeine, glucose, dextrose bolus **OR** dextrose bolus, glucagon (rDNA), dextrose    Lab Data:  CBC:   Recent Labs     01/06/24  0536 01/07/24  1119 01/08/24  0912   WBC 14.5* 15.6* 18.2*   HGB 12.0* 12.2* 12.7*   HCT 38.9* 38.7* 40.5*   .2* 100.5* 100.5*    229 227     BMP:   Recent Labs     01/06/24  0536 01/07/24  1119 01/08/24  0912    138 136   K 4.5 4.5 4.7    102 100   CO2 19* 22 25   PHOS 2.5 2.2* 2.5   BUN 22 29* 26*   CREATININE 0.7* 0.6* 0.6*     PT/INR:   No results for input(s): \"PROTIME\", \"INR\" in the last 72 hours.    BNP:    Recent Labs     01/07/24  1834   PROBNP 1,689*       TROPONIN: No results for input(s): \"TROPONINT\" in the last 72 hours.     ECHO :   echocardiogram    Assessment:  78 y.o.year old who is admitted for  Chief Complaint   Patient presents with    Fatigue    Shortness of Breath    , active issues as noted below:  Impression:  Principal Problem:    Severe malnutrition (HCC)  Active Problems:    Sepsis (HCC)    SIRS (systemic inflammatory response syndrome) (HCC)  Resolved Problems:    * No resolved hospital problems. *            All labs, medications and tests reviewed by myself , continue all other medications of all above medical condition 
  XR CHEST PORTABLE   Final Result   No acute process.         Vascular duplex upper extremity venous left   Final Result   Positive acute DVT within the left axillary vein.         XR CHEST PORTABLE   Final Result   Cardiomediastinal silhouette is stable and the lung fields are unchanged in   appearance with severe emphysema.  Bilateral pulmonary nodules unchanged.  No   pneumothorax or effusion.  No acute osseous abnormality.  Increased density   at the medial left lung base suspicious for infiltrate.      RECOMMENDATION:   Left lung base atelectasis or infiltrate.  Recommend follow-up imaging to   confirm resolution.         XR CHEST PORTABLE   Final Result   Stable examination.  No acute cardiopulmonary abnormality.         XR CHEST PORTABLE   Final Result   Bilateral upper lobe nodular opacities again noted      No acute cardiopulmonary process           Assessment/Plan:    #Dysphagia  -?rt mediastinal/hilar disease. Plan for radiation, hopefully to start inpatient.  Dr Bond has seen and evaluated.  -has enteral feeding tube, high residuals without symptoms    #Metastatic Squamous Cell Lung Cancer  -treatment naive, planning for Carbo/Taxol/Pembro + palliative radiation to skull base/mediastinal/hilar disease  -Started radiation on 1/4/2024.  Having difficulty tolerating positioning secondary to pain, will provide additional analgesia dose prior to transport.  -Difficulty tolerating radiation may be due more to anxiety than pain, offered 1mg PO ativan 1 hour prior to transport (peak  minutes) with rescue IV dose if needed.  Has not been tolerating radiation despite attempting with both anxiolytics and pain control, now on vapotherm and unstable to travel.  He does not wish to continue at this time and states that he wishes to pursue hospice for quality of life which he does not feel he has right now.  This is reasonable.    #Chronic Anemia  -r/t chronic disease, stable at baseline, continue to 
ABDOMEN (KUB) (SINGLE AP VIEW)   Final Result   No evidence of bowel obstruction.         XR CHEST PORTABLE   Final Result   No acute process.         Vascular duplex upper extremity venous left   Final Result   Positive acute DVT within the left axillary vein.         XR CHEST PORTABLE   Final Result   Cardiomediastinal silhouette is stable and the lung fields are unchanged in   appearance with severe emphysema.  Bilateral pulmonary nodules unchanged.  No   pneumothorax or effusion.  No acute osseous abnormality.  Increased density   at the medial left lung base suspicious for infiltrate.      RECOMMENDATION:   Left lung base atelectasis or infiltrate.  Recommend follow-up imaging to   confirm resolution.         XR CHEST PORTABLE   Final Result   Stable examination.  No acute cardiopulmonary abnormality.         XR CHEST PORTABLE   Final Result   Bilateral upper lobe nodular opacities again noted      No acute cardiopulmonary process           Assessment/Plan:    #Dysphagia  -?rt mediastinal/hilar disease. Plan for radiation, hopefully to start inpatient.  Dr Bond has seen and evaluated.  -has enteral feeding tube, high residuals without symptoms    #Metastatic Squamous Cell Lung Cancer  -treatment naive, planning for Carbo/Taxol/Pembro + palliative radiation to skull base/mediastinal/hilar disease  -Started radiation on 1/4/2024.  Having difficulty tolerating positioning secondary to pain, will provide additional analgesia dose prior to transport.  -Difficulty tolerating radiation may be due more to anxiety than pain, offered 1mg PO ativan 1 hour prior to transport (peak  minutes) with rescue IV dose if needed.  Has not been tolerating radiation despite attempting with both anxiolytics and pain control, now on vapotherm and unstable to travel.  He does not wish to continue at this time and states that he wishes to pursue hospice for quality of life which he does not feel he has right now.  This is 
PRN  glucagon (rDNA), 1 mg, PRN  dextrose, , Continuous PRN        Labs      No results found for this or any previous visit (from the past 24 hour(s)).       Imaging/Diagnostics Last 24 Hours   XR CHEST PORTABLE    Result Date: 1/2/2024  EXAMINATION: ONE XRAY VIEW OF THE CHEST 1/2/2024 3:44 pm COMPARISON: 11/29/2023 HISTORY: ORDERING SYSTEM PROVIDED HISTORY: fatigue/dyspnea TECHNOLOGIST PROVIDED HISTORY: Reason for exam:->fatigue/dyspnea Reason for Exam: fatigue/dyspnea Additional signs and symptoms: NA Relevant Medical/Surgical History: NA FINDINGS: Bilateral upper lobe nodular opacities again noted. There is no effusion or pneumothorax. The cardiomediastinal silhouette is stable. The osseous structures are stable.     Bilateral upper lobe nodular opacities again noted No acute cardiopulmonary process       Electronically signed by Robert Bernal MD on 1/15/2024 at 12:45 PM    
awareness  5. Pt will ambulate HH distance to bathroom for toileting CGA with RW or 4ww  6. Pt will complete all aspects of toileting task SBA  7. Pt will complete oral hygiene/grooming routine in standing at sink with supervision/no seated rest breaks  8. Pt will complete ther ex/ther act with focus on UE strengthening and functional activity tolerance in standing >6 minutes with stable vitals    Time:   Time in: 940  Time out: 1004  Timed treatment minutes: 9  Total time: 24    Electronically signed by:    CUONG Deutsch/L, MOT, [id number]    
distress, chronically ill appearing  EYES: EOM grossly intact, + conjunctival pallor, no scleral icterus  ENT: Normocephalic, without obvious abnormality, atraumatic  NECK: supple, symmetrical, no jugular venous distension  HEMATOLOGIC/LYMPHATIC: no cervical, supraclavicular or axillary lymphadenopathy   LUNGS: CTA bilaterally, no wheezes/rhonchi/rales, unlabored on 4L NC.  CARDIOVASCULAR: tachycardic with regular rhythm, normal S1 and S2, no murmur  ABDOMEN: Non-tender, non-distended, NABS  MUSCULOSKELETAL: full range of motion noted, tone is normal  NEUROLOGIC: Motor skills grossly intact. CN II - XII grossly intact.  SKIN: warm and dry, no jaundice, no bruising or petechiae.  EXTREMITIES: no peripheral edema, no clubbing or cyanosis     Labs:    Lab Results   Component Value Date    WBC 15.6 (H) 01/07/2024    HGB 12.2 (L) 01/07/2024    HCT 38.7 (L) 01/07/2024    .5 (H) 01/07/2024     01/07/2024    LYMPHOPCT 19.5 (L) 01/07/2024    RBC 3.85 (L) 01/07/2024    MCH 31.7 (H) 01/07/2024    MCHC 31.5 (L) 01/07/2024    RDW 13.9 01/07/2024     Lab Results   Component Value Date    INR 1.2 01/04/2024    PROTIME 15.5 (H) 01/04/2024     Lab Results   Component Value Date     01/07/2024    K 4.5 01/07/2024     01/07/2024    CO2 22 01/07/2024    BUN 29 (H) 01/07/2024    CREATININE 0.6 (L) 01/07/2024    GLUCOSE 127 (H) 01/07/2024    CALCIUM 9.2 01/07/2024    PHOS 2.2 (L) 01/07/2024    MG 2.0 01/07/2024    PROT 5.7 (L) 01/04/2024    LABALBU 3.6 01/07/2024    BILITOT 0.3 01/04/2024    ALKPHOS 118 01/04/2024    AST 12 (L) 01/04/2024    ALT 10 01/04/2024    LABGLOM >60 01/07/2024    GFRAA >60 02/15/2021    POCGLU 133 (H) 01/08/2024     Lab Results   Component Value Date    ALKPHOS 118 01/04/2024    ALT 10 01/04/2024    AST 12 (L) 01/04/2024    BILITOT 0.3 01/04/2024    PROT 5.7 (L) 01/04/2024     No results found for: \"URICACID\"  Lab Results   Component Value Date     08/04/2017     No results found 
of all above medical condition listed as is except for changes mentioned above.    Thank you very much for consult , please call with questions.    Sayed Tyler Drake MD, MD 1/6/2024 3:22 PM      
resolved hospital problems. *            All labs, medications and tests reviewed by myself , continue all other medications of all above medical condition listed as is except for changes mentioned above.    Thank you very much for consult , please call with questions.    Sayed Tyler Drake MD, MD 1/11/2024 12:37 PM      
DETECTED NOT DETECTED    Bordetella parapertussis by PCR NOT DETECTED NOT DETECTED    B Pertussis by PCR NOT DETECTED NOT DETECTED    Chlamydophila Pneumonia PCR NOT DETECTED NOT DETECTED    Mycoplasma pneumo by PCR NOT DETECTED NOT DETECTED   POCT Glucose    Collection Time: 01/03/24  3:25 AM   Result Value Ref Range    POC Glucose 123 (H) 70 - 99 MG/DL   Basic Metabolic Panel w/ Reflex to MG    Collection Time: 01/03/24  9:46 AM   Result Value Ref Range    Sodium 143 135 - 145 MMOL/L    Potassium 5.2 (H) 3.5 - 5.1 MMOL/L    Chloride 107 99 - 110 mMol/L    CO2 25 21 - 32 MMOL/L    Anion Gap 11 7 - 16    Glucose 110 (H) 70 - 99 MG/DL    BUN 32 (H) 6 - 23 MG/DL    Creatinine 0.8 (L) 0.9 - 1.3 MG/DL    Est, Glom Filt Rate >60 >60 mL/min/1.73m2    Calcium 8.8 8.3 - 10.6 MG/DL   CBC with Auto Differential    Collection Time: 01/03/24  9:46 AM   Result Value Ref Range    WBC 14.2 (H) 4.0 - 10.5 K/CU MM    RBC 3.59 (L) 4.6 - 6.2 M/CU MM    Hemoglobin 11.4 (L) 13.5 - 18.0 GM/DL    Hematocrit 39.0 (L) 42 - 52 %    .6 (H) 78 - 100 FL    MCH 31.8 (H) 27 - 31 PG    MCHC 29.2 (L) 32.0 - 36.0 %    RDW 14.3 11.7 - 14.9 %    Platelets 176 140 - 440 K/CU MM    MPV 10.2 7.5 - 11.1 FL    Differential Type AUTOMATED DIFFERENTIAL     Segs Relative 77.7 (H) 36 - 66 %    Lymphocytes % 19.3 (L) 24 - 44 %    Monocytes % 2.4 0 - 4 %    Eosinophils % 0.1 0 - 3 %    Basophils % 0.1 0 - 1 %    Segs Absolute 11.0 K/CU MM    Lymphocytes Absolute 2.7 K/CU MM    Monocytes Absolute 0.3 K/CU MM    Eosinophils Absolute 0.0 K/CU MM    Basophils Absolute 0.0 K/CU MM    Nucleated RBC % 0.0 %    Total Nucleated RBC 0.0 K/CU MM    Total Immature Neutrophil 0.06 K/CU MM    Immature Neutrophil % 0.4 0 - 0.43 %        Imaging/Diagnostics Last 24 Hours   XR CHEST PORTABLE    Result Date: 1/2/2024  EXAMINATION: ONE XRAY VIEW OF THE CHEST 1/2/2024 3:44 pm COMPARISON: 11/29/2023 HISTORY: ORDERING SYSTEM PROVIDED HISTORY: fatigue/dyspnea TECHNOLOGIST

## 2024-01-26 ENCOUNTER — CLINICAL DOCUMENTATION (OUTPATIENT)
Dept: ONCOLOGY | Age: 79
End: 2024-01-26

## (undated) DEVICE — ENFIT SET TRANSITION CONNECTOR, MEDLINE: Brand: MEDLINE

## (undated) DEVICE — ENFIT G-TUBE CONNECTOR, MEDLINE: Brand: MEDLINE

## (undated) DEVICE — QUICK SWITCH VALVE, NON-STERILE: Brand: MEDLINE

## (undated) DEVICE — TUBE GASTROSTMY DIA20FR STD STR REPL ENDOVIVE

## (undated) DEVICE — ENDOSCOPY KIT: Brand: MEDLINE INDUSTRIES, INC.